# Patient Record
Sex: FEMALE | Race: BLACK OR AFRICAN AMERICAN | NOT HISPANIC OR LATINO | Employment: UNEMPLOYED | URBAN - METROPOLITAN AREA
[De-identification: names, ages, dates, MRNs, and addresses within clinical notes are randomized per-mention and may not be internally consistent; named-entity substitution may affect disease eponyms.]

---

## 2018-01-13 NOTE — MISCELLANEOUS
Provider Comments  Provider Comments:   PT DID NOT SHOW FOR INITIAL APPT-NO FURTHER APPTS WILL BE GIVEN      Signatures   Electronically signed by : Bi Yeager DO; Mar 31 2016 11:27AM EST                       (Author)

## 2018-02-11 ENCOUNTER — HOSPITAL ENCOUNTER (EMERGENCY)
Facility: HOSPITAL | Age: 28
Discharge: HOME | End: 2018-02-11
Admitting: EMERGENCY MEDICINE

## 2018-02-11 ENCOUNTER — HOSPITAL ENCOUNTER (EMERGENCY)
Facility: HOSPITAL | Age: 28
Discharge: HOME | End: 2018-02-11
Attending: EMERGENCY MEDICINE

## 2018-02-11 VITALS
HEART RATE: 108 BPM | BODY MASS INDEX: 26.58 KG/M2 | TEMPERATURE: 98.6 F | HEIGHT: 63 IN | SYSTOLIC BLOOD PRESSURE: 142 MMHG | DIASTOLIC BLOOD PRESSURE: 85 MMHG | OXYGEN SATURATION: 100 % | WEIGHT: 150 LBS | RESPIRATION RATE: 18 BRPM

## 2018-02-11 VITALS
TEMPERATURE: 97.6 F | DIASTOLIC BLOOD PRESSURE: 82 MMHG | HEART RATE: 107 BPM | OXYGEN SATURATION: 99 % | SYSTOLIC BLOOD PRESSURE: 148 MMHG | RESPIRATION RATE: 20 BRPM

## 2018-02-11 DIAGNOSIS — Z51.89 ABSCESS RE-CHECK: Primary | ICD-10-CM

## 2018-02-11 DIAGNOSIS — L02.91 ABSCESS: Primary | ICD-10-CM

## 2018-02-11 PROCEDURE — 99283 EMERGENCY DEPT VISIT LOW MDM: CPT

## 2018-02-11 PROCEDURE — 96372 THER/PROPH/DIAG INJ SC/IM: CPT

## 2018-02-11 PROCEDURE — 87070 CULTURE OTHR SPECIMN AEROBIC: CPT | Performed by: PHYSICIAN ASSISTANT

## 2018-02-11 PROCEDURE — 99282 EMERGENCY DEPT VISIT SF MDM: CPT

## 2018-02-11 PROCEDURE — 87205 SMEAR GRAM STAIN: CPT | Performed by: PHYSICIAN ASSISTANT

## 2018-02-11 RX ORDER — MORPHINE SULFATE 4 MG/ML
4 INJECTION, SOLUTION INTRAMUSCULAR; INTRAVENOUS ONCE
Status: COMPLETED | OUTPATIENT
Start: 2018-02-11 | End: 2018-02-11

## 2018-02-11 RX ORDER — CLINDAMYCIN PHOSPHATE 150 MG/ML
600 INJECTION, SOLUTION INTRAVENOUS EVERY 8 HOURS
Status: DISCONTINUED | OUTPATIENT
Start: 2018-02-11 | End: 2018-02-11 | Stop reason: HOSPADM

## 2018-02-11 RX ORDER — LIDOCAINE HYDROCHLORIDE AND EPINEPHRINE 10; 10 MG/ML; UG/ML
1 INJECTION, SOLUTION INFILTRATION; PERINEURAL ONCE
Status: COMPLETED | OUTPATIENT
Start: 2018-02-11 | End: 2018-02-11

## 2018-02-11 RX ORDER — SULFAMETHOXAZOLE AND TRIMETHOPRIM 800; 160 MG/1; MG/1
1 TABLET ORAL EVERY 12 HOURS SCHEDULED
COMMUNITY
End: 2018-08-09

## 2018-02-11 RX ORDER — NAPROXEN 500 MG/1
250 TABLET ORAL 2 TIMES DAILY WITH MEALS
COMMUNITY
End: 2018-08-09

## 2018-02-11 RX ORDER — CLINDAMYCIN HYDROCHLORIDE 300 MG/1
300 CAPSULE ORAL EVERY 6 HOURS
Qty: 40 CAPSULE | Refills: 0 | Status: SHIPPED | OUTPATIENT
Start: 2018-02-11 | End: 2018-02-21

## 2018-02-11 RX ORDER — OXYCODONE HYDROCHLORIDE AND ACETAMINOPHEN 5; 325 MG/1; MG/1
1 TABLET ORAL EVERY 8 HOURS PRN
Qty: 10 TABLET | Refills: 0 | Status: SHIPPED | OUTPATIENT
Start: 2018-02-11 | End: 2018-02-15

## 2018-02-11 RX ADMIN — MORPHINE SULFATE 4 MG: 4 INJECTION, SOLUTION INTRAMUSCULAR; INTRAVENOUS at 04:18

## 2018-02-11 RX ADMIN — CLINDAMYCIN PHOSPHATE 600 MG: 150 INJECTION, SOLUTION INTRAMUSCULAR; INTRAVENOUS at 03:39

## 2018-02-11 RX ADMIN — LIDOCAINE HYDROCHLORIDE,EPINEPHRINE BITARTRATE 1 ML: 10; .01 INJECTION, SOLUTION INFILTRATION; PERINEURAL at 03:42

## 2018-02-11 NOTE — DISCHARGE INSTRUCTIONS
Abscess   WHAT YOU NEED TO KNOW:   A warm compress may help your abscess drain  Your healthcare provider may make a cut in the abscess so it can drain  You may need surgery to remove an abscess that is on your hands or buttocks  DISCHARGE INSTRUCTIONS:   Return to the emergency department if:   · The area around your abscess becomes very painful, warm, or has red streaks  · You have a fever and chills  · Your heart is beating faster than usual      · You feel faint or confused  Contact your healthcare provider if:   · Your abscess gets bigger or does not get better  · Your abscess returns  · You have questions or concerns about your condition or care  Medicines: You may  need any of the following:  · Antibiotics  help treat a bacterial infection  · Acetaminophen  decreases pain and fever  It is available without a doctor's order  Ask how much to take and how often to take it  Follow directions  Acetaminophen can cause liver damage if not taken correctly  · NSAIDs , such as ibuprofen, help decrease swelling, pain, and fever  This medicine is available with or without a doctor's order  NSAIDs can cause stomach bleeding or kidney problems in certain people  If you take blood thinner medicine, always ask your healthcare provider if NSAIDs are safe for you  Always read the medicine label and follow directions  · Take your medicine as directed  Contact your healthcare provider if you think your medicine is not helping or if you have side effects  Tell him or her if you are allergic to any medicine  Keep a list of the medicines, vitamins, and herbs you take  Include the amounts, and when and why you take them  Bring the list or the pill bottles to follow-up visits  Carry your medicine list with you in case of an emergency  Self-care:   · Apply a warm compress to your abscess  This will help it open and drain  Wet a washcloth in warm, but not hot, water  Apply the compress for 10 minutes  Repeat this 4 times each day  Do not  press on an abscess or try to open it with a needle  You may push the bacteria deeper or into your blood  · Do not share your clothes, towels, or sheets with anyone  This can spread the infection to others  · Wash your hands often  This can help prevent the spread of germs  Use soap and water or an alcohol-based hand rub  Care for your wound after it is drained:   · Care for your wound as directed  If your healthcare provider says it is okay, carefully remove the bandage and gauze packing  You may need to soak the gauze to get it out of your wound  Clean your wound and the area around it as directed  Dry the area and put on new, clean bandages  Change your bandages when they get wet or dirty  · Ask your healthcare provider how to change the gauze in your wound  Keep track of how many pieces of gauze are placed inside the wound  Do not put too much packing in the wound  Do not pack the gauze too tightly in your wound  Follow up with your healthcare provider in 1 to 3 days: You may need to have your packing removed or your bandage changed  Write down your questions so you remember to ask them during your visits  © 2017 [employer]  Information is for End User's use only and may not be sold, redistributed or otherwise used for commercial purposes  All illustrations and images included in CareNotes® are the copyrighted property of [employer] , [employer]  or Jj Munson  The above information is an  only  It is not intended as medical advice for individual conditions or treatments  Talk to your doctor, nurse or pharmacist before following any medical regimen to see if it is safe and effective for you

## 2018-02-11 NOTE — ED PROVIDER NOTES
History  Chief Complaint   Patient presents with    Abscess     seen at 4am today, abscess drained  was to return tomorrow  states having excessive amt of drainage, cant control it     Patient is a 51-year-old female presenting today for an abscess check  Was seen here at 4:00 a m  this morning with an abscess drainage  Was started on clindamycin  States that since then she has had a large amount of drainage from this region  Her abscess was packed  States that overall her pain has improved  Came back because she continues with large amount of purulent drainage  Denies fevers, nausea, vomiting, difficulty ambulating  Prior to Admission Medications   Prescriptions Last Dose Informant Patient Reported? Taking? clindamycin (CLEOCIN) 300 MG capsule   No No   Sig: Take 1 capsule (300 mg total) by mouth every 6 (six) hours for 10 days   naproxen (NAPROSYN) 500 mg tablet   Yes Yes   Sig: Take 250 mg by mouth 2 (two) times a day with meals   oxyCODONE-acetaminophen (PERCOCET) 5-325 mg per tablet   No No   Sig: Take 1 tablet by mouth every 8 (eight) hours as needed for moderate pain for up to 3 days Max Daily Amount: 3 tablets   sulfamethoxazole-trimethoprim (BACTRIM DS) 800-160 mg per tablet   Yes Yes   Sig: Take 1 tablet by mouth every 12 (twelve) hours      Facility-Administered Medications: None       History reviewed  No pertinent past medical history  Past Surgical History:   Procedure Laterality Date    TONSILLECTOMY         History reviewed  No pertinent family history  I have reviewed and agree with the history as documented  Social History   Substance Use Topics    Smoking status: Former Smoker    Smokeless tobacco: Never Used      Comment: quit 1 month ago    Alcohol use Yes      Comment: rarely        Review of Systems   Constitutional: Negative  HENT: Negative  Eyes: Negative  Respiratory: Negative  Cardiovascular: Negative  Gastrointestinal: Negative  Genitourinary: Negative  Musculoskeletal: Negative  Skin: Positive for color change and wound  Negative for pallor and rash  Neurological: Negative  All other systems reviewed and are negative  Physical Exam  ED Triage Vitals [02/11/18 1632]   Temperature Pulse Respirations Blood Pressure SpO2   97 6 °F (36 4 °C) (!) 107 20 148/82 99 %      Temp Source Heart Rate Source Patient Position - Orthostatic VS BP Location FiO2 (%)   Tympanic Monitor Sitting Right arm --      Pain Score       Worst Possible Pain           Orthostatic Vital Signs  Vitals:    02/11/18 1632   BP: 148/82   Pulse: (!) 107   Patient Position - Orthostatic VS: Sitting       Physical Exam   Constitutional: She is oriented to person, place, and time  She appears well-developed and well-nourished  HENT:   Head: Normocephalic and atraumatic  Eyes: Conjunctivae are normal    Neck: Normal range of motion  Cardiovascular: Regular rhythm, normal heart sounds and intact distal pulses  Pulmonary/Chest: Effort normal and breath sounds normal    S PO2 is 99% indicating adequate oxygenation  Neurological: She is alert and oriented to person, place, and time  Skin: Skin is warm and dry  Capillary refill takes less than 2 seconds  Nursing note and vitals reviewed  ED Medications  Medications - No data to display    Diagnostic Studies  Results Reviewed     Procedure Component Value Units Date/Time    Wound culture and Gram stain [[id number]] Collected:  02/11/18 1727    Lab Status:  No result Specimen:  Wound from Buttock                  No orders to display              Procedures  Procedures       Phone Contacts  ED Phone Contact    ED Course  ED Course                                MDM  Number of Diagnoses or Management Options  Diagnosis management comments: Packing was removed once again and de loculated  More purulence was drained  Packing was then replaced    Will have patient follow up with wound care in 2 days for re-evaluation and packing removal or sooner for any worsening  Encouraged continuation of clindamycin and encouraged warm compresses  Wound culture was sent  Patient is informed to return to the emergency department for worsening of symptoms and was given proper education regarding their diagnosis and symptoms  Otherwise the patient is informed to follow up with their primary care doctor for re-evaluation  The patient verbalizes understanding and agrees with above assessment and plan  All questions were answered  Please Note: Fluency Direct voice recognition software may have been used in the creation of this document  Wrong words or sound a like substitutions may have occurred due to the inherent limitations of the voice software  Amount and/or Complexity of Data Reviewed  Clinical lab tests: ordered  Review and summarize past medical records: yes  Independent visualization of images, tracings, or specimens: yes      CritCare Time    Disposition  Final diagnoses:   Abscess re-check     Time reflects when diagnosis was documented in both MDM as applicable and the Disposition within this note     Time User Action Codes Description Comment    2/11/2018  5:32 PM Miguel Angel Black Add [Z51 89] Abscess re-check       ED Disposition     ED Disposition Condition Comment    Discharge  [address] [facility] discharge to home/self care      Condition at discharge: Good        Follow-up Information     Follow up With Specialties Details Why Contact Info Additional [address] Emergency Department Emergency Medicine Go to If symptoms worsen [facility]  [phone] [facility], [place], [place], [address] Schedule an appointment as soon as possible for a visit in 2 days  [address] [place] [address] [place], [address]        Patient's Medications   Discharge Prescriptions    No medications on file     No discharge procedures on file      ED Provider  Electronically Signed by           Jennifer Nichols PA-C  02/11/18 Andrea Rivera PA-C  02/11/18 8914

## 2018-02-11 NOTE — ED PROVIDER NOTES
History  Chief Complaint   Patient presents with    Wound Check     pt sat on a safety pink about 4 days ago, went to the doctor and was started on abx but now states the redness and swelling are spreading and she has a big lump     31 y/o female presents with abscess on her left thigh area  She says she sat on a safety pin few days ago and the area code infected  She went to another hospital where they treated her with antibiotics and pain medications and told her to follow up  Patient reports the infection has gotten worse and now she has an abscess in that area  She denies fevers chills nausea vomiting or any other symptoms  History provided by:  Patient   used: No        None       History reviewed  No pertinent past medical history  Past Surgical History:   Procedure Laterality Date    TONSILLECTOMY         History reviewed  No pertinent family history  I have reviewed and agree with the history as documented  Social History   Substance Use Topics    Smoking status: Former Smoker    Smokeless tobacco: Never Used      Comment: quit 1 month ago    Alcohol use Yes      Comment: rarely        Review of Systems   All other systems reviewed and are negative  Physical Exam  ED Triage Vitals [02/11/18 0152]   Temperature Pulse Respirations Blood Pressure SpO2   98 6 °F (37 °C) (!) 108 18 142/85 100 %      Temp Source Heart Rate Source Patient Position - Orthostatic VS BP Location FiO2 (%)   Oral Monitor Lying Left arm --      Pain Score       Worst Possible Pain           Orthostatic Vital Signs  Vitals:    02/11/18 0152   BP: 142/85   Pulse: (!) 108   Patient Position - Orthostatic VS: Lying       Physical Exam   Constitutional: She is oriented to person, place, and time  She appears well-developed and well-nourished  HENT:   Head: Normocephalic and atraumatic  Eyes: EOM are normal  Pupils are equal, round, and reactive to light  Neck: Normal range of motion   Neck supple  Cardiovascular: Normal rate and regular rhythm  Pulmonary/Chest: Effort normal and breath sounds normal    Abdominal: Soft  Bowel sounds are normal    Musculoskeletal: Normal range of motion  Neurological: She is alert and oriented to person, place, and time  Skin: Skin is warm and dry  Left thigh :  Large 7 cm circular fluctuant mass noted consistent with the abscess with surrounding erythema  Psychiatric: She has a normal mood and affect  Nursing note and vitals reviewed  ED Medications  Medications   lidocaine-epinephrine (XYLOCAINE/EPINEPHRINE) 1 %-1:100,000 injection 1 mL (1 mL Infiltration Given 2/11/18 9204)   morphine (PF) 4 mg/mL injection 4 mg (4 mg Intramuscular Given 2/11/18 6561)       Diagnostic Studies  Results Reviewed     None                 No orders to display              Procedures  Incision/Drainage  Performed by: Anahy Mills by: Brad Chung     Patient location:  ED  Consent:     Consent obtained:  Verbal    Consent given by:  Patient    Alternatives discussed:  No treatment  Universal protocol:     Patient identity confirmed:  Verbally with patient  Location:     Type:  Abscess  Pre-procedure details:     Skin preparation:  Betadine  Anesthesia (see MAR for exact dosages): Anesthesia method:  Local infiltration    Local anesthetic:  Lidocaine 1% WITH epi  Procedure details:     Complexity:  Complex    Needle aspiration: no      Incision types:  Stab incision    Scalpel blade:  11    Approach:  Open    Incision depth:  Subcutaneous    Wound management:  Probed and deloculated    Drainage:  Purulent    Drainage amount:  Copious    Wound treatment:  Packing placed    Packing materials:  1/4 in iodoform gauze  Post-procedure details:     Patient tolerance of procedure:   Tolerated well, no immediate complications           Phone Contacts  ED Phone Contact    ED Course  ED Course                                MDM  Number of Diagnoses or Management Options  Abscess:   Diagnosis management comments: Patient given antibiotics and pain medication in the ED  I performed an I and D of the abscess as described in my procedure note  Recheck in the ED in 2 days  I also provided her referral to the wound [place]  Patient verbalized understanding of instructions had no further questions at the time of discharge  Patient Progress  Patient progress: stable    CritCare Time    Disposition  Final diagnoses:   Abscess     Time reflects when diagnosis was documented in both MDM as applicable and the Disposition within this note     Time User Action Codes Description Comment    2/11/2018  4:02 AM Khloe Monroe Add [L02 91] Abscess       ED Disposition     ED Disposition Condition Comment    Discharge  [address] [facility] discharge to home/self care      Condition at discharge: Stable        Follow-up Information     Follow up With Specialties Details Why Contact Info Additional Information    [address] [facility] Emergency Department Emergency Medicine In 2 days For wound re-check [address] [facility] ED, [place], [place], [address] Schedule an appointment as soon as possible for a visit in 2 days  [address] [facility], [place], [place], [address]        Discharge Medication List as of 2/11/2018  4:03 AM      START taking these medications    Details   clindamycin (CLEOCIN) 300 MG capsule Take 1 capsule (300 mg total) by mouth every 6 (six) hours for 10 days, Starting Sun 2/11/2018, Until Wed 2/21/2018, Print      oxyCODONE-acetaminophen (PERCOCET) 5-325 mg per tablet Take 1 tablet by mouth every 8 (eight) hours as needed for moderate pain for up to 3 days Max Daily Amount: 3 tablets, Starting Sun 2/11/2018, Until Wed 2/14/2018, Print           No discharge procedures on file     ED Provider  Electronically Signed by           Tiffany Lizama,   02/15/18 8216

## 2018-02-14 LAB
BACTERIA WND AEROBE CULT: NORMAL
GRAM STN SPEC: NORMAL

## 2018-02-15 ENCOUNTER — HOSPITAL ENCOUNTER (EMERGENCY)
Facility: HOSPITAL | Age: 28
Discharge: HOME | End: 2018-02-15

## 2018-02-15 VITALS
WEIGHT: 150 LBS | BODY MASS INDEX: 26.58 KG/M2 | HEART RATE: 85 BPM | SYSTOLIC BLOOD PRESSURE: 134 MMHG | HEIGHT: 63 IN | OXYGEN SATURATION: 99 % | TEMPERATURE: 98.1 F | DIASTOLIC BLOOD PRESSURE: 87 MMHG | RESPIRATION RATE: 18 BRPM

## 2018-02-15 DIAGNOSIS — Z51.89 WOUND CHECK, ABSCESS: Primary | ICD-10-CM

## 2018-02-15 PROCEDURE — 99282 EMERGENCY DEPT VISIT SF MDM: CPT

## 2018-02-15 NOTE — ED PROVIDER NOTES
History  Chief Complaint   Patient presents with    Wound Check     States here for recheck/packing removal left groin abscess  States improved     Patient is a 51-year-old female presenting today for a wound check of her left inner thigh for an abscess that was drained 4 days ago  States that it continues to drain in small amount  Overall feeling better with decreased pain and redness  Packing still present  Some episodes of vomiting, however no fevers, cough, congestion, shortness of breath etc              Prior to Admission Medications   Prescriptions Last Dose Informant Patient Reported? Taking? clindamycin (CLEOCIN) 300 MG capsule 2/15/2018 at Unknown time  No Yes   Sig: Take 1 capsule (300 mg total) by mouth every 6 (six) hours for 10 days   naproxen (NAPROSYN) 500 mg tablet 2/15/2018 at Unknown time  Yes Yes   Sig: Take 250 mg by mouth 2 (two) times a day with meals   oxyCODONE-acetaminophen (PERCOCET) 5-325 mg per tablet 2/14/2018 at Unknown time  No Yes   Sig: Take 1 tablet by mouth every 8 (eight) hours as needed for moderate pain for up to 3 days Max Daily Amount: 3 tablets   sulfamethoxazole-trimethoprim (BACTRIM DS) 800-160 mg per tablet 2/15/2018 at Unknown time  Yes Yes   Sig: Take 1 tablet by mouth every 12 (twelve) hours      Facility-Administered Medications: None       History reviewed  No pertinent past medical history  Past Surgical History:   Procedure Laterality Date    TONSILLECTOMY         History reviewed  No pertinent family history  I have reviewed and agree with the history as documented  Social History   Substance Use Topics    Smoking status: Former Smoker    Smokeless tobacco: Never Used      Comment: quit 1 month ago    Alcohol use Yes      Comment: rarely        Review of Systems   Constitutional: Negative  Negative for activity change and appetite change  HENT: Negative  Respiratory: Negative  Cardiovascular: Negative  Gastrointestinal: Negative  Genitourinary: Negative  Musculoskeletal: Negative  Negative for arthralgias and back pain  Skin: Positive for wound  Negative for color change, pallor and rash  Neurological: Negative  All other systems reviewed and are negative  Physical Exam  ED Triage Vitals [02/15/18 1159]   Temperature Pulse Respirations Blood Pressure SpO2   98 1 °F (36 7 °C) 85 18 134/87 99 %      Temp Source Heart Rate Source Patient Position - Orthostatic VS BP Location FiO2 (%)   Oral Monitor Sitting Left arm --      Pain Score       8           Orthostatic Vital Signs  Vitals:    02/15/18 1159   BP: 134/87   Pulse: 85   Patient Position - Orthostatic VS: Sitting       Physical Exam   Constitutional: She appears well-developed and well-nourished  Eyes: Conjunctivae are normal    Neck: Normal range of motion  Cardiovascular: Normal rate  Pulmonary/Chest: Effort normal    spo2 is 99% indicating adequate oxygenation  Neurological: She is alert  Skin: Skin is warm and dry  Capillary refill takes less than 2 seconds  No rash noted  No erythema  No pallor  Nursing note and vitals reviewed  ED Medications  Medications - No data to display    Diagnostic Studies  Results Reviewed     None                 No orders to display              Procedures  Procedures       Phone Contacts  ED Phone Contact    ED Course  ED Course                                MDM  Number of Diagnoses or Management Options  Wound check, abscess:   Diagnosis management comments: Patient left ED without papers  I informed her to f/u with wound care  Appears to be healing well however continues with induration  Overall feeling better  Patient was given strict return precautions for any worsening of symptoms otherwise encouraged to continue taking antibiotics  Patient is informed to return to the emergency department for worsening of symptoms and was given proper education regarding their diagnosis and symptoms   Otherwise the patient is informed to follow up with their primary care doctor for re-evaluation  The patient verbalizes understanding and agrees with above assessment and plan  All questions were answered  Please Note: Fluency Direct voice recognition software may have been used in the creation of this document  Wrong words or sound a like substitutions may have occurred due to the inherent limitations of the voice software  Amount and/or Complexity of Data Reviewed  Review and summarize past medical records: yes  Independent visualization of images, tracings, or specimens: yes      CritCare Time    Disposition  Final diagnoses:   Wound check, abscess     Time reflects when diagnosis was documented in both MDM as applicable and the Disposition within this note     Time User Action Codes Description Comment    2/15/2018 12:25 PM Apurva Amanda Add [Z51 89] Wound check, abscess       ED Disposition     ED Disposition Condition Comment    Discharge  [address] [facility] discharge to home/self care  Condition at discharge: Good        Follow-up Information     Follow up With Specialties Details Why Contact Info Additional [address] [facility] Schedule an appointment as soon as possible for a visit in 2 days  [address] [facility], [place], [place], [address] [facility] Emergency Department Emergency Medicine Go to If symptoms worsen [address] [facility]  [phone] [facility] ED, [place], [place], [address]        Patient's Medications   Discharge Prescriptions    No medications on file     No discharge procedures on file      ED Provider  Electronically Signed by           Teresa Taveras PA-C  02/15/18 4174

## 2018-08-09 ENCOUNTER — HOSPITAL ENCOUNTER (EMERGENCY)
Facility: HOSPITAL | Age: 28
Discharge: HOME | End: 2018-08-09
Attending: EMERGENCY MEDICINE | Admitting: EMERGENCY MEDICINE
Payer: COMMERCIAL

## 2018-08-09 VITALS
DIASTOLIC BLOOD PRESSURE: 81 MMHG | OXYGEN SATURATION: 100 % | TEMPERATURE: 98.4 F | HEART RATE: 94 BPM | RESPIRATION RATE: 18 BRPM | SYSTOLIC BLOOD PRESSURE: 137 MMHG | WEIGHT: 130.51 LBS | BODY MASS INDEX: 23.12 KG/M2

## 2018-08-09 DIAGNOSIS — L02.91 ABSCESS: Primary | ICD-10-CM

## 2018-08-09 PROCEDURE — 87070 CULTURE OTHR SPECIMN AEROBIC: CPT | Performed by: PHYSICIAN ASSISTANT

## 2018-08-09 PROCEDURE — 90471 IMMUNIZATION ADMIN: CPT

## 2018-08-09 PROCEDURE — 90715 TDAP VACCINE 7 YRS/> IM: CPT

## 2018-08-09 PROCEDURE — 87205 SMEAR GRAM STAIN: CPT | Performed by: PHYSICIAN ASSISTANT

## 2018-08-09 PROCEDURE — 99283 EMERGENCY DEPT VISIT LOW MDM: CPT

## 2018-08-09 RX ORDER — IBUPROFEN 600 MG/1
600 TABLET ORAL EVERY 6 HOURS PRN
Qty: 30 TABLET | Refills: 0 | Status: SHIPPED | OUTPATIENT
Start: 2018-08-09 | End: 2020-02-20

## 2018-08-09 RX ORDER — SULFAMETHOXAZOLE AND TRIMETHOPRIM 800; 160 MG/1; MG/1
1 TABLET ORAL ONCE
Status: COMPLETED | OUTPATIENT
Start: 2018-08-09 | End: 2018-08-09

## 2018-08-09 RX ORDER — SULFAMETHOXAZOLE AND TRIMETHOPRIM 800; 160 MG/1; MG/1
1 TABLET ORAL 2 TIMES DAILY
Qty: 14 TABLET | Refills: 0 | Status: SHIPPED | OUTPATIENT
Start: 2018-08-09 | End: 2018-08-16

## 2018-08-09 RX ORDER — ACETAMINOPHEN 325 MG/1
TABLET ORAL
Status: COMPLETED
Start: 2018-08-09 | End: 2018-08-09

## 2018-08-09 RX ORDER — ACETAMINOPHEN 325 MG/1
650 TABLET ORAL ONCE
Status: COMPLETED | OUTPATIENT
Start: 2018-08-09 | End: 2018-08-09

## 2018-08-09 RX ORDER — CEPHALEXIN 500 MG/1
500 CAPSULE ORAL ONCE
Status: COMPLETED | OUTPATIENT
Start: 2018-08-09 | End: 2018-08-09

## 2018-08-09 RX ORDER — LIDOCAINE HYDROCHLORIDE 10 MG/ML
INJECTION, SOLUTION EPIDURAL; INFILTRATION; INTRACAUDAL; PERINEURAL
Status: COMPLETED
Start: 2018-08-09 | End: 2018-08-09

## 2018-08-09 RX ORDER — SULFAMETHOXAZOLE AND TRIMETHOPRIM 800; 160 MG/1; MG/1
TABLET ORAL
Status: COMPLETED
Start: 2018-08-09 | End: 2018-08-09

## 2018-08-09 RX ORDER — LIDOCAINE HYDROCHLORIDE 10 MG/ML
INJECTION, SOLUTION EPIDURAL; INFILTRATION; INTRACAUDAL; PERINEURAL
Status: DISCONTINUED
Start: 2018-08-09 | End: 2018-08-09 | Stop reason: HOSPADM

## 2018-08-09 RX ORDER — CEPHALEXIN 500 MG/1
500 CAPSULE ORAL EVERY 8 HOURS SCHEDULED
Qty: 30 CAPSULE | Refills: 0 | Status: SHIPPED | OUTPATIENT
Start: 2018-08-09 | End: 2018-08-19

## 2018-08-09 RX ORDER — LIDOCAINE HYDROCHLORIDE 10 MG/ML
4 INJECTION, SOLUTION EPIDURAL; INFILTRATION; INTRACAUDAL; PERINEURAL ONCE
Status: DISCONTINUED | OUTPATIENT
Start: 2018-08-09 | End: 2018-08-09 | Stop reason: HOSPADM

## 2018-08-09 RX ORDER — CEPHALEXIN 500 MG/1
CAPSULE ORAL
Status: COMPLETED
Start: 2018-08-09 | End: 2018-08-09

## 2018-08-09 RX ADMIN — TETANUS TOXOID, REDUCED DIPHTHERIA TOXOID AND ACELLULAR PERTUSSIS VACCINE, ADSORBED 0.5 ML: 5; 2.5; 8; 8; 2.5 SUSPENSION INTRAMUSCULAR at 14:43

## 2018-08-09 RX ADMIN — ACETAMINOPHEN 650 MG: 325 TABLET ORAL at 14:42

## 2018-08-09 RX ADMIN — CEPHALEXIN 500 MG: 500 CAPSULE ORAL at 14:42

## 2018-08-09 RX ADMIN — SULFAMETHOXAZOLE AND TRIMETHOPRIM 1 TABLET: 800; 160 TABLET ORAL at 14:42

## 2018-08-09 RX ADMIN — LIDOCAINE HYDROCHLORIDE 20 MG: 10 INJECTION, SOLUTION EPIDURAL; INFILTRATION; INTRACAUDAL; PERINEURAL at 14:45

## 2018-08-09 NOTE — DISCHARGE INSTRUCTIONS
Abscess   WHAT YOU NEED TO KNOW:   A warm compress may help your abscess drain  Your healthcare provider may make a cut in the abscess so it can drain  You may need surgery to remove an abscess that is on your hands or buttocks  DISCHARGE INSTRUCTIONS:   Return to the emergency department if:   · The area around your abscess becomes very painful, warm, or has red streaks  · You have a fever and chills  · Your heart is beating faster than usual      · You feel faint or confused  Contact your healthcare provider if:   · Your abscess gets bigger or does not get better  · Your abscess returns  · You have questions or concerns about your condition or care  Medicines: You may  need any of the following:  · Antibiotics  help treat a bacterial infection  · Acetaminophen  decreases pain and fever  It is available without a doctor's order  Ask how much to take and how often to take it  Follow directions  Acetaminophen can cause liver damage if not taken correctly  · NSAIDs , such as ibuprofen, help decrease swelling, pain, and fever  This medicine is available with or without a doctor's order  NSAIDs can cause stomach bleeding or kidney problems in certain people  If you take blood thinner medicine, always ask your healthcare provider if NSAIDs are safe for you  Always read the medicine label and follow directions  · Take your medicine as directed  Contact your healthcare provider if you think your medicine is not helping or if you have side effects  Tell him or her if you are allergic to any medicine  Keep a list of the medicines, vitamins, and herbs you take  Include the amounts, and when and why you take them  Bring the list or the pill bottles to follow-up visits  Carry your medicine list with you in case of an emergency  Self-care:   · Apply a warm compress to your abscess  This will help it open and drain  Wet a washcloth in warm, but not hot, water  Apply the compress for 10 minutes  Repeat this 4 times each day  Do not  press on an abscess or try to open it with a needle  You may push the bacteria deeper or into your blood  · Do not share your clothes, towels, or sheets with anyone  This can spread the infection to others  · Wash your hands often  This can help prevent the spread of germs  Use soap and water or an alcohol-based hand rub  Care for your wound after it is drained:   · Care for your wound as directed  If your healthcare provider says it is okay, carefully remove the bandage and gauze packing  You may need to soak the gauze to get it out of your wound  Clean your wound and the area around it as directed  Dry the area and put on new, clean bandages  Change your bandages when they get wet or dirty  · Ask your healthcare provider how to change the gauze in your wound  Keep track of how many pieces of gauze are placed inside the wound  Do not put too much packing in the wound  Do not pack the gauze too tightly in your wound  Follow up with your healthcare provider in 1 to 3 days: You may need to have your packing removed or your bandage changed  Write down your questions so you remember to ask them during your visits  © 2017 [facility] Information is for End User's use only and may not be sold, redistributed or otherwise used for commercial purposes  All illustrations and images included in CareNotes® are the copyrighted property of [employer]  or Jj Munson  The above information is an  only  It is not intended as medical advice for individual conditions or treatments  Talk to your doctor, nurse or pharmacist before following any medical regimen to see if it is safe and effective for you  Abscess   WHAT YOU NEED TO KNOW:   A warm compress may help your abscess drain  Your healthcare provider may make a cut in the abscess so it can drain  You may need surgery to remove an abscess that is on your hands or buttocks  DISCHARGE INSTRUCTIONS:   Return to the emergency department if:   · The area around your abscess becomes very painful, warm, or has red streaks  · You have a fever and chills  · Your heart is beating faster than usual      · You feel faint or confused  Contact your healthcare provider if:   · Your abscess gets bigger or does not get better  · Your abscess returns  · You have questions or concerns about your condition or care  Medicines: You may  need any of the following:  · Antibiotics  help treat a bacterial infection  · Acetaminophen  decreases pain and fever  It is available without a doctor's order  Ask how much to take and how often to take it  Follow directions  Acetaminophen can cause liver damage if not taken correctly  · NSAIDs , such as ibuprofen, help decrease swelling, pain, and fever  This medicine is available with or without a doctor's order  NSAIDs can cause stomach bleeding or kidney problems in certain people  If you take blood thinner medicine, always ask your healthcare provider if NSAIDs are safe for you  Always read the medicine label and follow directions  · Take your medicine as directed  Contact your healthcare provider if you think your medicine is not helping or if you have side effects  Tell him or her if you are allergic to any medicine  Keep a list of the medicines, vitamins, and herbs you take  Include the amounts, and when and why you take them  Bring the list or the pill bottles to follow-up visits  Carry your medicine list with you in case of an emergency  Self-care:   · Apply a warm compress to your abscess  This will help it open and drain  Wet a washcloth in warm, but not hot, water  Apply the compress for 10 minutes  Repeat this 4 times each day  Do not  press on an abscess or try to open it with a needle  You may push the bacteria deeper or into your blood  · Do not share your clothes, towels, or sheets with anyone    This can spread the infection to others  · Wash your hands often  This can help prevent the spread of germs  Use soap and water or an alcohol-based hand rub  Care for your wound after it is drained:   · Care for your wound as directed  If your healthcare provider says it is okay, carefully remove the bandage and gauze packing  You may need to soak the gauze to get it out of your wound  Clean your wound and the area around it as directed  Dry the area and put on new, clean bandages  Change your bandages when they get wet or dirty  · Ask your healthcare provider how to change the gauze in your wound  Keep track of how many pieces of gauze are placed inside the wound  Do not put too much packing in the wound  Do not pack the gauze too tightly in your wound  Follow up with your healthcare provider in 1 to 3 days: You may need to have your packing removed or your bandage changed  Write down your questions so you remember to ask them during your visits  © 2017 [address] [facility] Information is for End User's use only and may not be sold, redistributed or otherwise used for commercial purposes  All illustrations and images included in CareNotes® are the copyrighted property of A [employer] M [employer]  or Jj Munson  The above information is an  only  It is not intended as medical advice for individual conditions or treatments  Talk to your doctor, nurse or pharmacist before following any medical regimen to see if it is safe and effective for you

## 2018-08-09 NOTE — ED PROVIDER NOTES
History  Chief Complaint   Patient presents with    Abscess     I believe I have an abscess  I thought it was a pimple and I was sitting on a heating pad for 3 days but it never came to head  Reports its on her L inner buttcheek       History provided by:  Patient   used: No    Medical Problem   Location:  Left buttocks pain -boil- for 3 days   Severity:  Moderate  Onset quality:  Gradual  Duration:  3 days  Timing:  Constant  Progression:  Worsening  Chronicity:  New  Associated symptoms: no abdominal pain, no chest pain, no congestion, no cough, no diarrhea, no ear pain, no fatigue, no fever, no headaches, no loss of consciousness, no myalgias, no nausea, no rash, no rhinorrhea, no shortness of breath, no sore throat, no vomiting and no wheezing        None       History reviewed  No pertinent past medical history  Past Surgical History:   Procedure Laterality Date    TONSILLECTOMY         History reviewed  No pertinent family history  I have reviewed and agree with the history as documented  Social History   Substance Use Topics    Smoking status: Former Smoker    Smokeless tobacco: Never Used      Comment: quit 1 month ago    Alcohol use Yes      Comment: rarely        Review of Systems   Constitutional: Negative for fatigue and fever  HENT: Negative  Negative for congestion, ear pain, rhinorrhea and sore throat  Eyes: Negative  Respiratory: Negative  Negative for cough, shortness of breath and wheezing  Cardiovascular: Negative  Negative for chest pain  Gastrointestinal: Negative  Negative for abdominal pain, diarrhea, nausea and vomiting  Endocrine: Negative  Genitourinary: Negative  Musculoskeletal: Negative for myalgias  Skin: Negative for rash  boil   Allergic/Immunologic: Negative  Neurological: Negative  Negative for loss of consciousness and headaches  Hematological: Negative  Psychiatric/Behavioral: Negative      All other systems reviewed and are negative  Physical Exam  Physical Exam   Constitutional: She appears well-developed and well-nourished  HENT:   Head: Normocephalic  Eyes: Conjunctivae and EOM are normal  Pupils are equal, round, and reactive to light  Neck: Normal range of motion  Neck supple  Cardiovascular: Normal rate, regular rhythm and normal heart sounds  Pulmonary/Chest: Effort normal    Abdominal: Soft  Bowel sounds are normal    Musculoskeletal: Normal range of motion  Neurological: She is alert  Skin: Skin is warm  Left superior medal buttocks abscess  2cm fluctuant    Nursing note and vitals reviewed  Vital Signs  ED Triage Vitals   Temperature Pulse Respirations Blood Pressure SpO2   08/09/18 1340 08/09/18 1340 08/09/18 1340 08/09/18 1340 08/09/18 1340   98 4 °F (36 9 °C) 94 18 137/81 100 %      Temp Source Heart Rate Source Patient Position - Orthostatic VS BP Location FiO2 (%)   08/09/18 1340 08/09/18 1340 -- 08/09/18 1340 --   Temporal Monitor  Left arm       Pain Score       08/09/18 1442       5           Vitals:    08/09/18 1340   BP: 137/81   Pulse: 94       Visual Acuity      ED Medications  Medications   lidocaine (PF) (XYLOCAINE-MPF) 1 % injection 4 mL (not administered)   lidocaine (PF) (XYLOCAINE-MPF) 1 % injection **AcuDose Override Pull** (20 mg  Given 8/9/18 1445)   acetaminophen (TYLENOL) tablet 650 mg (650 mg Oral Given 8/9/18 1442)   sulfamethoxazole-trimethoprim (BACTRIM DS) 800-160 mg per tablet 1 tablet (1 tablet Oral Given 8/9/18 1442)   cephalexin (KEFLEX) capsule 500 mg (500 mg Oral Given 8/9/18 1442)   tetanus-diphtheria-acellular pertussis (BOOSTRIX) IM injection 0 5 mL (0 5 mL Intramuscular Given 8/9/18 1443)       Diagnostic Studies  Results Reviewed     Procedure Component Value Units Date/Time    Wound culture and Gram stain [[id number]] Collected:  08/09/18 1438    Lab Status:   In process Specimen:  Wound from Sacrum Updated:  08/09/18 1443 No orders to display              Procedures  Incision/Drainage  Date/Time: 8/9/2018 2:46 PM  Performed by: MALOU Clinton  Authorized by: MALOU PEACOCK     Patient location:  ED  Other Assisting Provider: Yes (comment)    Consent:     Consent obtained:  Verbal    Consent given by:  Patient    Risks discussed:  Incomplete drainage, pain, infection, damage to other organs and bleeding    Alternatives discussed:  No treatment  Universal protocol:     Immediately prior to procedure a time out was called: yes      Patient identity confirmed:  Verbally with patient and arm band  Location:     Type:  Abscess    Size:  2cm    Location:  Trunk    Trunk location:  Back  Pre-procedure details:     Skin preparation:  Betadine  Anesthesia (see MAR for exact dosages): Anesthesia method:  Local infiltration    Local anesthetic:  Lidocaine 1% w/o epi  Procedure details:     Complexity:  Simple    Needle aspiration: no      Incision types:  Single straight    Scalpel blade:  11    Approach:  Open    Incision depth:  Subcutaneous    Wound management:  Probed and deloculated and irrigated with saline    Drainage:  Purulent    Drainage amount: Moderate    Wound treatment:  Packing placed    Packing materials:  1/4 in iodoform gauze    Amount 1/4" iodoform:  6cm  Post-procedure details:     Patient tolerance of procedure: Tolerated well, no immediate complications           Phone Contacts  ED Phone Contact    ED Course                               MDM  CritCare Time    Disposition  Final diagnoses:   Abscess     Time reflects when diagnosis was documented in both MDM as applicable and the Disposition within this note     Time User Action Codes Description Comment    8/9/2018  2:48 PM Evelyne Cancer, [address] [L02 91] Abscess       ED Disposition     ED Disposition Condition Comment    Discharge  [address] [facility] discharge to home/self care      Condition at discharge: Good        Follow-up Information     Follow up With Specialties Details Why [facility] Emergency Department Emergency Medicine In 2 days saturday 2pm [address] [address] [phone]          Discharge Medication List as of 8/9/2018  2:51 PM      START taking these medications    Details   cephalexin (KEFLEX) 500 mg capsule Take 1 capsule (500 mg total) by mouth every 8 (eight) hours for 10 days, Starting Thu 8/9/2018, Until Sun 8/19/2018, Print      ibuprofen (MOTRIN) 600 mg tablet Take 1 tablet (600 mg total) by mouth every 6 (six) hours as needed (pain), Starting Thu 8/9/2018, Print      sulfamethoxazole-trimethoprim (BACTRIM DS) 800-160 mg per tablet Take 1 tablet by mouth 2 (two) times a day for 7 days smx-tmp DS (BACTRIM) 800-160 mg tabs (1tab q12 D10), Starting Thu 8/9/2018, Until Thu 8/16/2018, Print           No discharge procedures on file      ED Provider  Electronically Signed by           Dylon Gould PA-C  08/09/18 5948

## 2018-08-11 ENCOUNTER — HOSPITAL ENCOUNTER (EMERGENCY)
Facility: HOSPITAL | Age: 28
Discharge: HOME | End: 2018-08-11
Attending: EMERGENCY MEDICINE
Payer: COMMERCIAL

## 2018-08-11 VITALS
OXYGEN SATURATION: 99 % | TEMPERATURE: 98.5 F | BODY MASS INDEX: 23.04 KG/M2 | DIASTOLIC BLOOD PRESSURE: 67 MMHG | HEIGHT: 63 IN | HEART RATE: 78 BPM | RESPIRATION RATE: 16 BRPM | SYSTOLIC BLOOD PRESSURE: 123 MMHG | WEIGHT: 130 LBS

## 2018-08-11 DIAGNOSIS — K59.00 CONSTIPATION: ICD-10-CM

## 2018-08-11 DIAGNOSIS — Z51.89 WOUND CHECK, ABSCESS: Primary | ICD-10-CM

## 2018-08-11 PROCEDURE — 99282 EMERGENCY DEPT VISIT SF MDM: CPT

## 2018-08-11 NOTE — ED PROVIDER NOTES
History  Chief Complaint   Patient presents with    Abscess     I had an abscess to my tail bone  I was here 2 days ago, when they opened it  I am taking my ordered medications  49-year-old female presenting for wound check of gluteal abscess  Patient reports being seen in this ED approximately 2 days ago for an I and D of a left gluteal abscess  Packing was applied at that time  She reports she has been taking her prescriptions of both Keflex and Bactrim as prescribed  She does admit to drainage from the area but has been changing the dressings regularly  She denies any fevers chills or sweats  Patient also complaining of constipation she has been scared to have a bowel movement since 1st noticing this abscess  Prior to Admission Medications   Prescriptions Last Dose Informant Patient Reported? Taking? cephalexin (KEFLEX) 500 mg capsule   No No   Sig: Take 1 capsule (500 mg total) by mouth every 8 (eight) hours for 10 days   ibuprofen (MOTRIN) 600 mg tablet   No No   Sig: Take 1 tablet (600 mg total) by mouth every 6 (six) hours as needed (pain)   sulfamethoxazole-trimethoprim (BACTRIM DS) 800-160 mg per tablet   No No   Sig: Take 1 tablet by mouth 2 (two) times a day for 7 days smx-tmp DS (BACTRIM) 800-160 mg tabs (1tab q12 D10)      Facility-Administered Medications: None       History reviewed  No pertinent past medical history  Past Surgical History:   Procedure Laterality Date    TONSILLECTOMY         History reviewed  No pertinent family history  I have reviewed and agree with the history as documented  Social History   Substance Use Topics    Smoking status: Former Smoker    Smokeless tobacco: Never Used      Comment: quit 1 month ago    Alcohol use Yes      Comment: rarely        Review of Systems   All other systems reviewed and are negative  Physical Exam  Physical Exam   Constitutional: She is oriented to person, place, and time   She appears well-developed and well-nourished  No distress  HENT:   Head: Normocephalic and atraumatic  Eyes: Conjunctivae are normal    EOM grossly intact   Neck: Normal range of motion  Neck supple  No JVD present  Cardiovascular: Normal rate  Pulmonary/Chest: Effort normal    Abdominal: Soft  Musculoskeletal:   FROM, steady gait, cap refill brisk, strength and sensation grossly intact throughout   Neurological: She is alert and oriented to person, place, and time  Skin: Skin is warm and dry  Capillary refill takes less than 2 seconds  Psychiatric: She has a normal mood and affect  Her behavior is normal    Nursing note and vitals reviewed  Vital Signs  ED Triage Vitals [08/11/18 0753]   Temperature Pulse Respirations Blood Pressure SpO2   98 5 °F (36 9 °C) 78 16 123/67 99 %      Temp Source Heart Rate Source Patient Position - Orthostatic VS BP Location FiO2 (%)   Temporal Monitor Sitting Left arm --      Pain Score       3           Vitals:    08/11/18 0753   BP: 123/67   Pulse: 78   Patient Position - Orthostatic VS: Sitting       Visual Acuity      ED Medications  Medications - No data to display    Diagnostic Studies  Results Reviewed     None                 No orders to display              Procedures  Procedures       Phone Contacts  ED Phone Contact    ED Course                               MDM  Number of Diagnoses or Management Options  Constipation:   Wound check, abscess:   Diagnosis management comments: 80-year-old female here for wound check of abscess status post incision and drainage approximately 2 days ago, patient has been taking antibiotics and denies any signs of infection, packing removed at this time, abscess appears to be healing well without an excess of purulent drainage, will reapply dressing and sent home to continue antibiotics    Initially spoke with patient regarding using Metamucil over-the-counter to as a stool softener    strict return to ED precautions discussed   Pt verbalizes understanding and agrees with plan  Pt is stable for discharge    Portions of the record may have been created with voice recognition software  Occasional wrong word or "sound a like" substitutions may have occurred due to the inherent limitations of voice recognition software  Read the chart carefully and recognize, using context, where substitutions have occurred  CritCare Time    Disposition  Final diagnoses:   Wound check, abscess   Constipation     Time reflects when diagnosis was documented in both MDM as applicable and the Disposition within this note     Time User Action Codes Description Comment    8/11/2018  8:15 AM Pratik Ram Add [Z51 89] Wound check, abscess     8/11/2018  8:16 AM Lowell CEBALLOS Add [K59 00] Constipation       ED Disposition     ED Disposition Condition Comment    Discharge  [address] [facility] discharge to home/self care  Condition at discharge: Good        Follow-up Information     Follow up With Specialties Details Why [facility] Family Medicine Call today If symptoms worsen [address] [place] [address]  [id number]  [phone]          Patient's Medications   Discharge Prescriptions    No medications on file     No discharge procedures on file      ED Provider  Electronically Signed by           Lashon Damon PA-C  08/11/18 0762

## 2018-08-11 NOTE — DISCHARGE INSTRUCTIONS
Constipation   WHAT YOU NEED TO KNOW:   Constipation is when you have hard, dry bowel movements, or you go longer than usual between bowel movements  DISCHARGE INSTRUCTIONS:   Return to the emergency department if:   · You have blood in your bowel movements  · You have a fever and abdominal pain with the constipation  Contact your healthcare provider if:   · Your constipation gets worse  · You start to vomit  · You have questions or concerns about your condition or care  Medicines:   · Medicine or a fiber supplement  may help make your bowel movement softer  A laxative may help relax and loosen your intestines to help you have a bowel movement  You may also be given medicine to increase fluid in your intestines  The fluid may help move bowel movements through your intestines  · Take your medicine as directed  Contact your healthcare provider if you think your medicine is not helping or if you have side effects  Tell him of her if you are allergic to any medicine  Keep a list of the medicines, vitamins, and herbs you take  Include the amounts, and when and why you take them  Bring the list or the pill bottles to follow-up visits  Carry your medicine list with you in case of an emergency  Manage your constipation:   · Drink liquids as directed  You may need to drink extra liquids to help soften and move your bowels  Ask how much liquid to drink each day and which liquids are best for you  · Eat high-fiber foods  This may help decrease constipation by adding bulk to your bowel movements  High-fiber foods include fruit, vegetables, whole-grain breads and cereals, and beans  Your healthcare provider or dietitian can help you create a high-fiber meal plan  · Exercise regularly  Regular physical activity can help stimulate your intestines  Ask which exercises are best for you  · Schedule a time each day to have a bowel movement    This may help train your body to have regular bowel movements  Bend forward while you are on the toilet to help move the bowel movement out  Sit on the toilet for at least 10 minutes, even if you do not have a bowel movement  Follow up with your healthcare provider as directed:  Write down your questions so you remember to ask them during your visits  © 2017 [address] [employer] Information is for End User's use only and may not be sold, redistributed or otherwise used for commercial purposes  All illustrations and images included in CareNotes® are the copyrighted property of [employer]  or [employer]  The above information is an  only  It is not intended as medical advice for individual conditions or treatments  Talk to your doctor, nurse or pharmacist before following any medical regimen to see if it is safe and effective for you  Warm Compress or Soak   WHAT YOU NEED TO KNOW:   A warm compress or soak helps improve blood flow to tissues and relieve pain and swelling  This will help you heal from an injury or illness  You may need a warm compress or soak to help manage any of the following:  · A sinus infection or upper respiratory infection    · A blocked tear duct, eye infection, or a stye    · A skin abscess or infection    · An ingrown toenail    · An ear infection    · A soft or deep tissue injury    · A muscle or joint injury, such as a sprain  DISCHARGE INSTRUCTIONS:   Contact your healthcare provider if:   · Your symptoms do not improve or you have new symptoms  · You see blisters on the area where you applied the compress or soak  · You have questions or concerns about your condition or care  How to prepare and use a moist warm compress: Your healthcare provider will tell you how often to apply a warm compress:  · Wash your hands  · Use a washcloth, small towel, or gauze as your compress  · You can place the compress under running water or place it in a bowl with warm water   Check the temperature of the water with a thermometer  The water should not be warmer than 100°F for babies, 105°F for children, and 120°F for adults  Adults should use water that is 105°F if they will apply the compress to an eye  · If directed, add 1 tablespoon of salt to the water  Squeeze extra water out of the compress  · Place the compress directly on the area  If directed, gently massage the area with the compress  Check your skin in 2 minutes for blisters or bright red skin  Your skin should look pink to light red  · You may need to rewarm the compress every 5 minutes  · Remove the compress in 15 to 30 minutes, or when the compress starts to feel cold  Gently pat your skin dry with a clean towel  · Wash your hands  · Reapply the compress as many times as directed each day  Use a clean compress every time  How to use a dry warm compress:  A dry compress may be a hot water bottle or a heating pad  You can also buy a prepared hot pack  Follow the package directions for how to use these devices  Cover a bottle or hot pack with a towel before you apply it to your skin  Do not leave a dry compress on your skin for more than 20 minutes or as directed  Do not fall asleep with a dry compress on your skin  A dry compress may burn your skin if it is left on for too long  How to prepare and use a warm soak:   · Fill a clean container or tub with warm water and soap  The container should be deep enough to cover the area completely  · Check the temperature of the water with a thermometer  The water should not be warmer than 100°F for children and babies, and 110°F for adults  · If directed, add 1 tablespoon of salt to the water  · Remove any bandages  · Soak the area for 30 minutes or as long as directed  Gently pat your skin dry when you are done soaking  · Replace bandages as directed  · Clean the container or tub when finished  · Wash your hands    Follow up with your healthcare provider as directed:  Write down your questions so you remember to ask them during your visits  © 2017 [address] Information is for End User's use only and may not be sold, redistributed or otherwise used for commercial purposes  All illustrations and images included in CareNotes® are the copyrighted property of [employer]  or Jj Munson  The above information is an  only  It is not intended as medical advice for individual conditions or treatments  Talk to your doctor, nurse or pharmacist before following any medical regimen to see if it is safe and effective for you

## 2018-08-11 NOTE — ED NOTES
Abscess site dressed with 4x4's and tape  No erythema, edema    Small amount of yellow exudate     Ladan Webber RN  08/11/18 4441

## 2018-08-13 LAB
BACTERIA WND AEROBE CULT: NORMAL
GRAM STN SPEC: NORMAL

## 2019-05-08 ENCOUNTER — HOSPITAL ENCOUNTER (EMERGENCY)
Facility: HOSPITAL | Age: 29
Discharge: LEFT BEFORE DISCHARGE | End: 2019-05-08
Attending: EMERGENCY MEDICINE
Payer: COMMERCIAL

## 2019-05-08 VITALS
DIASTOLIC BLOOD PRESSURE: 97 MMHG | OXYGEN SATURATION: 100 % | SYSTOLIC BLOOD PRESSURE: 159 MMHG | RESPIRATION RATE: 15 BRPM | WEIGHT: 133 LBS | BODY MASS INDEX: 23.56 KG/M2 | HEART RATE: 84 BPM | TEMPERATURE: 97.8 F

## 2019-05-08 DIAGNOSIS — K05.219 PERIODONTAL ABSCESS: ICD-10-CM

## 2019-05-08 DIAGNOSIS — K02.9 DENTAL CARIES: ICD-10-CM

## 2019-05-08 DIAGNOSIS — K08.89 PAIN, DENTAL: ICD-10-CM

## 2019-05-08 DIAGNOSIS — K08.89 DENTALGIA: Primary | ICD-10-CM

## 2019-05-08 PROCEDURE — 99282 EMERGENCY DEPT VISIT SF MDM: CPT

## 2019-05-08 PROCEDURE — 99283 EMERGENCY DEPT VISIT LOW MDM: CPT | Performed by: PHYSICIAN ASSISTANT

## 2019-05-08 RX ORDER — CLINDAMYCIN HYDROCHLORIDE 300 MG/1
300 CAPSULE ORAL
Qty: 30 CAPSULE | Refills: 0 | Status: SHIPPED | OUTPATIENT
Start: 2019-05-08 | End: 2019-05-18

## 2019-09-14 ENCOUNTER — HOSPITAL ENCOUNTER (EMERGENCY)
Facility: HOSPITAL | Age: 29
Discharge: HOME | End: 2019-09-14
Attending: EMERGENCY MEDICINE
Payer: COMMERCIAL

## 2019-09-14 VITALS
SYSTOLIC BLOOD PRESSURE: 178 MMHG | OXYGEN SATURATION: 99 % | WEIGHT: 133.16 LBS | RESPIRATION RATE: 16 BRPM | DIASTOLIC BLOOD PRESSURE: 104 MMHG | TEMPERATURE: 96.9 F | BODY MASS INDEX: 23.59 KG/M2 | HEART RATE: 76 BPM

## 2019-09-14 DIAGNOSIS — K04.7 DENTAL INFECTION: ICD-10-CM

## 2019-09-14 DIAGNOSIS — J06.9 VIRAL URI WITH COUGH: Primary | ICD-10-CM

## 2019-09-14 PROCEDURE — 99283 EMERGENCY DEPT VISIT LOW MDM: CPT

## 2019-09-14 PROCEDURE — 99283 EMERGENCY DEPT VISIT LOW MDM: CPT | Performed by: PHYSICIAN ASSISTANT

## 2019-09-14 RX ORDER — ACETAMINOPHEN 500 MG
500 TABLET ORAL EVERY 6 HOURS PRN
Qty: 30 TABLET | Refills: 0 | Status: SHIPPED | OUTPATIENT
Start: 2019-09-14 | End: 2020-02-20

## 2019-09-14 RX ORDER — CLINDAMYCIN HYDROCHLORIDE 150 MG/1
150 CAPSULE ORAL 4 TIMES DAILY
Qty: 40 CAPSULE | Refills: 0 | Status: SHIPPED | OUTPATIENT
Start: 2019-09-14 | End: 2019-09-24

## 2019-09-14 RX ORDER — IBUPROFEN 400 MG/1
400 TABLET ORAL EVERY 6 HOURS PRN
Qty: 20 TABLET | Refills: 0 | Status: SHIPPED | OUTPATIENT
Start: 2019-09-14 | End: 2020-02-20

## 2019-09-14 NOTE — ED PROVIDER NOTES
History  Chief Complaint   Patient presents with    Cough     states that she has had a cough for about 2 weeks  also having nausea    Dental Pain     having rt upper dental pain for over 1 year     Patient is a 31-year-old female presents today with a chief complaint of nasal congestion, sore throat, nonproductive cough over the past 2 days  Patient reports her sore throat is radiating up into her right dental area where she has chronic dental problems and notes the pain there which is aching and throbbing in nature has been exacerbated by the sore throat  Patient reports she has been taking Tylenol with no relief for symptoms  Patient denies any difficulty turning her head or neck and notes no difficulty swallowing or difficulty breathing  Patient reports he has been eating and drinking without problem and denies any chest pain, shortness of breath, abdominal pain, nausea, vomiting, diarrhea  Patient reports he does not have a dentist with whom she is able to follow on a regular basis however notes she just recently obtained health insurance and will be attempting to make a dental appointment in the near future  History provided by:  Patient  Sore Throat   Location:  Generalized  Quality:  Aching  Severity:  Mild  Onset quality:  Gradual  Timing:  Constant  Progression:  Unchanged  Chronicity:  New  Relieved by:  None tried  Worsened by:  Eating  Ineffective treatments:  None tried  Associated symptoms: cough, rhinorrhea and sinus congestion    Associated symptoms: no abdominal pain, no chest pain, no chills, no ear pain, no fever, no neck stiffness, no rash and no shortness of breath        Prior to Admission Medications   Prescriptions Last Dose Informant Patient Reported? Taking?   ibuprofen (MOTRIN) 600 mg tablet   No No   Sig: Take 1 tablet (600 mg total) by mouth every 6 (six) hours as needed (pain)      Facility-Administered Medications: None       History reviewed   No pertinent past medical history  Past Surgical History:   Procedure Laterality Date    TONSILLECTOMY  09/01/2013       Family History   Problem Relation Age of Onset    Hypertension Mother     Diabetes Maternal Grandfather      I have reviewed and agree with the history as documented  Social History     Tobacco Use    Smoking status: Former Smoker    Smokeless tobacco: Never Used    Tobacco comment: quit 3 months ago   Substance Use Topics    Alcohol use: Yes     Comment: rarely    Drug use: No        Review of Systems   Constitutional: Negative for chills, fatigue and fever  HENT: Positive for dental problem, rhinorrhea, sinus pressure and sore throat  Negative for congestion and ear pain  Eyes: Negative for redness  Respiratory: Positive for cough  Negative for chest tightness and shortness of breath  Cardiovascular: Negative for chest pain and palpitations  Gastrointestinal: Negative for abdominal pain, nausea and vomiting  Genitourinary: Negative for dysuria and hematuria  Musculoskeletal: Negative  Negative for neck stiffness  Skin: Negative for rash  Neurological: Negative for dizziness, syncope, light-headedness and numbness  Physical Exam  Physical Exam   Constitutional: She is oriented to person, place, and time  She appears well-developed and well-nourished  HENT:   Head: Normocephalic  Right Ear: Tympanic membrane normal    Left Ear: Tympanic membrane normal    Nose: Mucosal edema present  Mouth/Throat: Uvula is midline  Posterior oropharyngeal erythema ( minimal) present  No oropharyngeal exudate  Tonsils are 0 on the right  Tonsils are 0 on the left  No tonsillar exudate  Eyes: No scleral icterus  Cardiovascular: Normal rate and regular rhythm  Pulmonary/Chest: Effort normal and breath sounds normal  No stridor  Abdominal: Soft  She exhibits no distension  There is no tenderness  Musculoskeletal: Normal range of motion     Neurological: She is alert and oriented to person, place, and time  Skin: Skin is warm and dry  Capillary refill takes less than 2 seconds  Psychiatric: She has a normal mood and affect  Nursing note and vitals reviewed  Vital Signs  ED Triage Vitals [09/14/19 1400]   Temperature Pulse Respirations Blood Pressure SpO2   (!) 96 9 °F (36 1 °C) 76 16 (!) 178/104 99 %      Temp Source Heart Rate Source Patient Position - Orthostatic VS BP Location FiO2 (%)   Tympanic Monitor Sitting Left arm --      Pain Score       9           Vitals:    09/14/19 1400   BP: (!) 178/104   Pulse: 76   Patient Position - Orthostatic VS: Sitting         Visual Acuity      ED Medications  Medications - No data to display    Diagnostic Studies  Results Reviewed     None                 No orders to display              Procedures  Procedures       ED Course                               MDM    Disposition  Final diagnoses:   Viral URI with cough   Dental infection     Time reflects when diagnosis was documented in both MDM as applicable and the Disposition within this note     Time User Action Codes Description Comment    9/14/2019  2:10 PM Gerber Whelan Add [J06 9,  B97 89] Viral URI with cough     9/14/2019  2:10 PM Gerber Whelan Add [K04 7] Dental infection       ED Disposition     ED Disposition Condition Date/Time Comment    Discharge Good Sat Sep 14, 2019  2:10 PM [address] [facility] discharge to home/self care              Follow-up Information     Follow up With Specialties Details Why Contact Info    Cosme Mcnally MD Family Medicine Schedule an appointment as soon as possible for a visit in 2 days As needed [address] [facility] [facility] [address]       [address] [facility]  Schedule an appointment as soon as possible for a visit in 2 days  [address] [facility]          Patient's Medications   Discharge Prescriptions    ACETAMINOPHEN (TYLENOL) 500 MG TABLET    Take 1 tablet (500 mg total) by mouth every 6 (six) hours as needed (pain)       Start Date: 9/14/2019 End Date: --       Order Dose: 500 mg       Quantity: 30 tablet    Refills: 0    CLINDAMYCIN (CLEOCIN) 150 MG CAPSULE    Take 1 capsule (150 mg total) by mouth 4 (four) times a day for 10 days       Start Date: 9/14/2019 End Date: 9/24/2019       Order Dose: 150 mg       Quantity: 40 capsule    Refills: 0    IBUPROFEN (MOTRIN) 400 MG TABLET    Take 1 tablet (400 mg total) by mouth every 6 (six) hours as needed for moderate pain       Start Date: 9/14/2019 End Date: --       Order Dose: 400 mg       Quantity: 20 tablet    Refills: 0    MENTHOL-CETYLPYRIDINIUM (CEPACOL) 3 MG LOZENGE    Take 1 lozenge (3 mg total) by mouth as needed for sore throat       Start Date: 9/14/2019 End Date: --       Order Dose: 3 mg       Quantity: 30 lozenge    Refills: 0     No discharge procedures on file      ED Provider  Electronically Signed by           Tian Ball PA-C  09/14/19 5786

## 2020-02-20 ENCOUNTER — HOSPITAL ENCOUNTER (EMERGENCY)
Facility: HOSPITAL | Age: 30
Discharge: HOME | End: 2020-02-20
Attending: EMERGENCY MEDICINE | Admitting: EMERGENCY MEDICINE
Payer: COMMERCIAL

## 2020-02-20 VITALS
HEART RATE: 90 BPM | RESPIRATION RATE: 16 BRPM | BODY MASS INDEX: 23.38 KG/M2 | SYSTOLIC BLOOD PRESSURE: 131 MMHG | OXYGEN SATURATION: 100 % | DIASTOLIC BLOOD PRESSURE: 89 MMHG | TEMPERATURE: 97.3 F | WEIGHT: 132 LBS

## 2020-02-20 DIAGNOSIS — Z76.89 ENCOUNTER FOR ASSESSMENT OF STD EXPOSURE: Primary | ICD-10-CM

## 2020-02-20 DIAGNOSIS — N39.0 UTI (URINARY TRACT INFECTION): ICD-10-CM

## 2020-02-20 LAB
BACTERIA UR QL AUTO: ABNORMAL /HPF
BILIRUB UR QL STRIP: NEGATIVE
CLARITY UR: CLEAR
COLOR UR: ABNORMAL
EXT PREG TEST URINE: NEGATIVE
EXT. CONTROL ED NAV: NORMAL
GLUCOSE UR STRIP-MCNC: NEGATIVE MG/DL
HGB UR QL STRIP.AUTO: 25
KETONES UR STRIP-MCNC: NEGATIVE MG/DL
LEUKOCYTE ESTERASE UR QL STRIP: 25
MUCOUS THREADS UR QL AUTO: ABNORMAL
NITRITE UR QL STRIP: POSITIVE
NON-SQ EPI CELLS URNS QL MICRO: ABNORMAL /HPF
PH UR STRIP.AUTO: 6 [PH]
PROT UR STRIP-MCNC: NEGATIVE MG/DL
RBC #/AREA URNS AUTO: ABNORMAL /HPF
SP GR UR STRIP.AUTO: 1.01 (ref 1–1.04)
UROBILINOGEN UA: NEGATIVE MG/DL
WBC #/AREA URNS AUTO: ABNORMAL /HPF

## 2020-02-20 PROCEDURE — 81003 URINALYSIS AUTO W/O SCOPE: CPT | Performed by: PHYSICIAN ASSISTANT

## 2020-02-20 PROCEDURE — 96372 THER/PROPH/DIAG INJ SC/IM: CPT

## 2020-02-20 PROCEDURE — 81025 URINE PREGNANCY TEST: CPT | Performed by: PHYSICIAN ASSISTANT

## 2020-02-20 PROCEDURE — 99283 EMERGENCY DEPT VISIT LOW MDM: CPT

## 2020-02-20 PROCEDURE — 99284 EMERGENCY DEPT VISIT MOD MDM: CPT | Performed by: PHYSICIAN ASSISTANT

## 2020-02-20 PROCEDURE — 87491 CHLMYD TRACH DNA AMP PROBE: CPT | Performed by: PHYSICIAN ASSISTANT

## 2020-02-20 PROCEDURE — 81001 URINALYSIS AUTO W/SCOPE: CPT | Performed by: PHYSICIAN ASSISTANT

## 2020-02-20 PROCEDURE — 87591 N.GONORRHOEAE DNA AMP PROB: CPT | Performed by: PHYSICIAN ASSISTANT

## 2020-02-20 RX ORDER — DOXYCYCLINE HYCLATE 100 MG/1
100 CAPSULE ORAL 2 TIMES DAILY
Qty: 20 CAPSULE | Refills: 0 | Status: SHIPPED | OUTPATIENT
Start: 2020-02-20 | End: 2020-03-01

## 2020-02-20 RX ORDER — CEPHALEXIN 500 MG/1
500 CAPSULE ORAL EVERY 8 HOURS SCHEDULED
Qty: 30 CAPSULE | Refills: 0 | Status: SHIPPED | OUTPATIENT
Start: 2020-02-20 | End: 2020-03-01

## 2020-02-20 RX ORDER — DOXYCYCLINE HYCLATE 100 MG/1
100 CAPSULE ORAL ONCE
Status: COMPLETED | OUTPATIENT
Start: 2020-02-20 | End: 2020-02-20

## 2020-02-20 RX ADMIN — DOXYCYCLINE 100 MG: 100 CAPSULE ORAL at 14:00

## 2020-02-20 RX ADMIN — LIDOCAINE HYDROCHLORIDE 250 MG: 10 INJECTION, SOLUTION EPIDURAL; INFILTRATION; INTRACAUDAL; PERINEURAL at 14:00

## 2020-02-20 NOTE — ED PROVIDER NOTES
History  Chief Complaint   Patient presents with    Possible UTI     Patient reports burning while urinating  Patient is a 26-year-old sexually active female who presents today for evaluation of dysuria which has been ongoing for the past 5 days  Patient reports she has been sexually active with a new partner and has not used protection and is concerned for STDs would like testing and treatment at this time today  Patient denies any abdominal pain, flank pain, hematuria, fevers, chills, nausea, vomiting, diarrhea, constipation  Patient reports she does have an OB gyn with whom she is able to follow      History provided by:  Patient  Difficulty Urinating   Pain quality:  Burning  Pain severity:  Moderate  Onset quality:  Gradual  Duration:  5 days  Timing:  Constant  Progression:  Unchanged  Chronicity:  New  Recent urinary tract infections: no    Relieved by:  None tried  Worsened by:  Nothing  Ineffective treatments:  None tried  Urinary symptoms: frequent urination    Associated symptoms: no abdominal pain, no fever, no flank pain, no genital lesions, no nausea, no vaginal discharge and no vomiting        None       History reviewed  No pertinent past medical history  Past Surgical History:   Procedure Laterality Date    TONSILLECTOMY  09/01/2013       Family History   Problem Relation Age of Onset    Hypertension Mother     Diabetes Maternal Grandfather      I have reviewed and agree with the history as documented  Social History     Tobacco Use    Smoking status: Former Smoker    Smokeless tobacco: Never Used    Tobacco comment: quit 3 months ago   Substance Use Topics    Alcohol use: Yes     Comment: rarely    Drug use: No       Review of Systems   Constitutional: Negative for chills, fatigue and fever  HENT: Negative for congestion, ear pain, rhinorrhea and sore throat  Eyes: Negative for redness  Respiratory: Negative for chest tightness and shortness of breath  Cardiovascular: Negative for chest pain and palpitations  Gastrointestinal: Negative for abdominal pain, nausea and vomiting  Genitourinary: Positive for difficulty urinating, dysuria, frequency and vaginal bleeding ( patient reports she is currently on her period)  Negative for dyspareunia, enuresis, flank pain, genital sores, hematuria, menstrual problem, pelvic pain, urgency, vaginal discharge and vaginal pain  Musculoskeletal: Negative  Skin: Negative for rash  Neurological: Negative for dizziness, syncope, light-headedness and numbness  Physical Exam  Physical Exam   Constitutional: She is oriented to person, place, and time  She appears well-developed and well-nourished  HENT:   Head: Normocephalic  Eyes: No scleral icterus  Cardiovascular: Normal rate and regular rhythm  Pulmonary/Chest: Effort normal and breath sounds normal  No stridor  Abdominal: Soft  She exhibits no distension  There is no tenderness  Benign abdominal exam  Normal bowel sounds auscultated in all 4 quadrants  No tenderness to palpation, both light and deep in all 4 quadrants  Musculoskeletal: Normal range of motion  Neurological: She is alert and oriented to person, place, and time  Skin: Skin is warm and dry  Capillary refill takes less than 2 seconds  Psychiatric: She has a normal mood and affect  Nursing note and vitals reviewed        Vital Signs  ED Triage Vitals   Temperature Pulse Respirations Blood Pressure SpO2   02/20/20 1339 02/20/20 1339 02/20/20 1339 02/20/20 1339 02/20/20 1339   (!) 97 3 °F (36 3 °C) 90 16 131/89 100 %      Temp src Heart Rate Source Patient Position - Orthostatic VS BP Location FiO2 (%)   -- -- 02/20/20 1339 02/20/20 1339 --     Sitting Left arm       Pain Score       02/20/20 1439       No Pain           Vitals:    02/20/20 1339   BP: 131/89   Pulse: 90   Patient Position - Orthostatic VS: Sitting         Visual Acuity      ED Medications  Medications   cefTRIAXone (ROCEPHIN) 250 mg in lidocaine (PF) (XYLOCAINE-MPF) 1 % IM only syringe (250 mg Intramuscular Given 2/20/20 1400)   doxycycline hyclate (VIBRAMYCIN) capsule 100 mg (100 mg Oral Given 2/20/20 1400)       Diagnostic Studies  Results Reviewed     Procedure Component Value Units Date/Time    Urine Microscopic [[id number]]  (Abnormal) Collected:  02/20/20 1352    Lab Status:  Final result Specimen:  Urine, Clean Catch Updated:  02/20/20 1431     RBC, UA 0-1 /hpf      WBC, UA 2-4 /hpf      Epithelial Cells Occasional /hpf      Bacteria, UA Innumerable /hpf      MUCUS THREADS Occasional    UA w Reflex to Microscopic w Reflex to Culture [[id number]]  (Abnormal) Collected:  02/20/20 1352    Lab Status:  Final result Specimen:  Urine, Clean Catch Updated:  02/20/20 1414     Color, UA Straw     Clarity, UA Clear     Specific Gravity, UA 1 015     pH, UA 6 0     Leukocytes, UA 25 0     Nitrite, UA Positive     Protein, UA Negative mg/dl      Glucose, UA Negative mg/dl      Ketones, UA Negative mg/dl      Bilirubin, UA Negative     Blood, UA 25 0     UROBILINOGEN UA Negative mg/dL     Chlamydia/GC amplified DNA by PCR [[id number]] Collected:  02/20/20 1351    Lab Status: In process Specimen:  Urine, Other Updated:  02/20/20 1359    POCT pregnancy, urine [[id number]]  (Normal) Resulted:  02/20/20 1359    Lab Status:  Final result Updated:  02/20/20 1359     EXT PREG TEST UR (Ref: Negative) negative     Control valid                 No orders to display              Procedures  Procedures         ED Course  ED Course as of Feb 20 1639   Thu Feb 20, 2020   1423 Nitrite, UA(!): Positive                               MDM  Number of Diagnoses or Management Options  Encounter for assessment of STD exposure:   UTI (urinary tract infection):   Diagnosis management comments: Patient allergic to Azithromycin, will cover for GC with Doxycycline instead BID 100mg, PO, x 10 days          Disposition  Final diagnoses:   Encounter for assessment of STD exposure   UTI (urinary tract infection)     Time reflects when diagnosis was documented in both MDM as applicable and the Disposition within this note     Time User Action Codes Description Comment    2/20/2020  2:29 PM Eletha Pair Add [R30 0] Dysuria     2/20/2020  2:29 PM Eletha Pair Add [Z76 89] Encounter for assessment of STD exposure     2/20/2020  2:30 PM DonJoshua georgevirgen Pan Anupama Jaylen Isaacs [N39 0] UTI (urinary tract infection)     2/20/2020  2:30 PM Tori Andres, [address] [facility] [address] [Z76 89] Encounter for assessment of STD exposure     2/20/2020  2:30 PM Eletha Pair Remove [R30 0] Dysuria       ED Disposition     ED Disposition Condition Date/Time Comment    Discharge Good Thu Feb 20, 2020  2:29 PM [address] [facility] discharge to home/self care  Follow-up Information     Follow up With Specialties Details Why Contact Info    Eva Morales DO Family Medicine Schedule an appointment as soon as possible for a visit  As needed [address]      [address] [facility] Obstetrics and Gynecology Schedule an appointment as soon as possible for a visit in 2 days  [address] [facility] [address] [place] [address]  [phone]            Discharge Medication List as of 2/20/2020  2:31 PM      START taking these medications    Details   cephalexin (KEFLEX) 500 mg capsule Take 1 capsule (500 mg total) by mouth every 8 (eight) hours for 10 days, Starting Thu 2/20/2020, Until Sun 3/1/2020, Print      doxycycline hyclate (VIBRAMYCIN) 100 mg capsule Take 1 capsule (100 mg total) by mouth 2 (two) times a day for 10 days, Starting Thu 2/20/2020, Until Sun 3/1/2020, Print           No discharge procedures on file      PDMP Review     None          ED Provider  Electronically Signed by           Barrie Eagle PA-C  02/20/20 7890

## 2020-02-24 LAB
C TRACH DNA SPEC QL NAA+PROBE: NEGATIVE
N GONORRHOEA DNA SPEC QL NAA+PROBE: NEGATIVE

## 2020-03-24 ENCOUNTER — TELEPHONE (OUTPATIENT)
Dept: FAMILY MEDICINE CLINIC | Facility: CLINIC | Age: 30
End: 2020-03-24

## 2020-09-18 ENCOUNTER — OFFICE VISIT (OUTPATIENT)
Dept: FAMILY MEDICINE CLINIC | Facility: CLINIC | Age: 30
End: 2020-09-18
Payer: COMMERCIAL

## 2020-09-18 VITALS
DIASTOLIC BLOOD PRESSURE: 80 MMHG | BODY MASS INDEX: 24.14 KG/M2 | TEMPERATURE: 98.1 F | SYSTOLIC BLOOD PRESSURE: 120 MMHG | RESPIRATION RATE: 18 BRPM | WEIGHT: 141.4 LBS | HEIGHT: 64 IN | HEART RATE: 80 BPM

## 2020-09-18 DIAGNOSIS — K21.9 GASTROESOPHAGEAL REFLUX DISEASE WITHOUT ESOPHAGITIS: ICD-10-CM

## 2020-09-18 DIAGNOSIS — D68.51 HETEROZYGOUS FACTOR V LEIDEN MUTATION (HCC): ICD-10-CM

## 2020-09-18 DIAGNOSIS — G81.14 SPASTIC HEMIPLEGIA AFFECTING LEFT NONDOMINANT SIDE, UNSPECIFIED ETIOLOGY (HCC): Primary | ICD-10-CM

## 2020-09-18 DIAGNOSIS — F32.A DEPRESSION, UNSPECIFIED DEPRESSION TYPE: ICD-10-CM

## 2020-09-18 DIAGNOSIS — G47.00 INSOMNIA, UNSPECIFIED TYPE: ICD-10-CM

## 2020-09-18 DIAGNOSIS — K59.00 CONSTIPATION, UNSPECIFIED CONSTIPATION TYPE: ICD-10-CM

## 2020-09-18 DIAGNOSIS — I63.511 ACUTE ISCHEMIC RIGHT MCA STROKE (HCC): ICD-10-CM

## 2020-09-18 PROBLEM — R77.8 ELEVATED TROPONIN: Status: ACTIVE | Noted: 2020-08-05

## 2020-09-18 PROBLEM — R79.89 ELEVATED TROPONIN: Status: ACTIVE | Noted: 2020-08-05

## 2020-09-18 PROBLEM — G81.94 HEMIPLEGIA AFFECTING LEFT NONDOMINANT SIDE (HCC): Status: ACTIVE | Noted: 2020-08-05

## 2020-09-18 PROBLEM — I63.81 STROKE OF RIGHT BASAL GANGLIA (HCC): Status: ACTIVE | Noted: 2020-08-05

## 2020-09-18 PROCEDURE — 99203 OFFICE O/P NEW LOW 30 MIN: CPT | Performed by: NURSE PRACTITIONER

## 2020-09-18 RX ORDER — ASPIRIN 81 MG/1
81 TABLET, CHEWABLE ORAL DAILY
COMMUNITY
Start: 2020-08-15 | End: 2020-10-02 | Stop reason: ALTCHOICE

## 2020-09-18 RX ORDER — AMLODIPINE BESYLATE 5 MG/1
5 TABLET ORAL DAILY
Start: 2020-09-18 | End: 2020-10-02 | Stop reason: SDUPTHER

## 2020-09-18 RX ORDER — SENNOSIDES 8.6 MG
2 TABLET ORAL
Qty: 120 EACH | Refills: 0 | Status: SHIPPED | OUTPATIENT
Start: 2020-09-18 | End: 2020-09-18 | Stop reason: ALTCHOICE

## 2020-09-18 RX ORDER — LANOLIN ALCOHOL/MO/W.PET/CERES
3 CREAM (GRAM) TOPICAL
Refills: 0
Start: 2020-09-18 | End: 2021-05-25 | Stop reason: SDUPTHER

## 2020-09-18 RX ORDER — ATORVASTATIN CALCIUM 80 MG/1
80 TABLET, FILM COATED ORAL DAILY
Qty: 30 TABLET | Refills: 5
Start: 2020-09-18 | End: 2020-10-02 | Stop reason: SDUPTHER

## 2020-09-18 RX ORDER — ATORVASTATIN CALCIUM 80 MG/1
80 TABLET, FILM COATED ORAL DAILY
COMMUNITY
Start: 2020-08-14 | End: 2020-10-02 | Stop reason: SDUPTHER

## 2020-09-18 RX ORDER — FAMOTIDINE 20 MG/1
20 TABLET, FILM COATED ORAL 2 TIMES DAILY
Start: 2020-09-18 | End: 2020-10-02 | Stop reason: SDUPTHER

## 2020-09-18 RX ORDER — MIDODRINE HYDROCHLORIDE 2.5 MG/1
2.5 TABLET ORAL 2 TIMES DAILY
COMMUNITY
Start: 2020-08-14 | End: 2020-09-18 | Stop reason: ALTCHOICE

## 2020-09-18 RX ORDER — DOCUSATE SODIUM 100 MG/1
100 CAPSULE, LIQUID FILLED ORAL 2 TIMES DAILY
Qty: 10 CAPSULE | Refills: 0
Start: 2020-09-18 | End: 2021-12-26

## 2020-09-18 RX ORDER — BACLOFEN 10 MG/1
10 TABLET ORAL 4 TIMES DAILY
Refills: 0
Start: 2020-09-18 | End: 2021-12-26

## 2020-09-18 RX ORDER — FLUOXETINE HYDROCHLORIDE 20 MG/1
20 CAPSULE ORAL DAILY
Start: 2020-09-18 | End: 2020-10-02 | Stop reason: SDUPTHER

## 2020-09-18 NOTE — PROGRESS NOTES
Assessment/Plan:    No problem-specific Assessment & Plan notes found for this encounter  Diagnoses and all orders for this visit:    Spastic hemiplegia affecting left nondominant side, unspecified etiology ([address] )    Depression, unspecified depression type  -     FLUoxetine (PROzac) 20 mg capsule; Take 1 capsule (20 mg total) by mouth daily    Constipation, unspecified constipation type  -     Discontinue: senna (SENOKOT) 8 6 mg; Take 2 tablets (17 2 mg total) by mouth daily at bedtime    Acute ischemic right MCA stroke (HCC)    Other orders  -     aspirin 81 mg chewable tablet; Chew 81 mg daily  -     atorvastatin (LIPITOR) 80 mg tablet; Take 80 mg by mouth daily  -     Discontinue: midodrine (PROAMATINE) 2 5 mg tablet; Take 2 5 mg by mouth 2 (two) times a day          Subjective:      Patient ID: Yamileth Goodman is a 27 y o  female  HPI  Here for follow    Had weakness on left side   Went to [facility] and had a CVA 8/4 -8/14   Then [facility] form 8//14-9/4  Acute ischemic right MCA stroke    Unknown cause    Had thrombectomy   Thought to have factor V Leiden   Had clot after thrombectomy   Have see cardiology and getting a loop recorder   Will be present for 3 years      Still due to see neurology    [facility] and [facility] and has follow up with them     Cont left sided weakness and using a can   Speech is ok        The following portions of the patient's history were reviewed and updated as appropriate: allergies, current medications, past family history, past medical history, past social history, past surgical history and problem list     Review of Systems      Objective:  Vitals:    09/18/20 1418 09/18/20 1420   BP:  120/80   BP Location:  Left arm   Patient Position:  Sitting   Cuff Size:  Standard   Pulse:  80   Resp:  18   Temp: 98 1 °F (36 7 °C) 98 1 °F (36 7 °C)   TempSrc: Temporal Temporal   Weight: 64 1 kg (141 lb 6 4 oz) 64 1 kg (141 lb 6 4 oz)   Height: 5' 3 5" (1 613 m) 5' 3 5" (1 613 m) Physical Exam  Vitals signs reviewed  Constitutional:       Appearance: Normal appearance  She is normal weight  HENT:      Right Ear: Tympanic membrane, ear canal and external ear normal       Left Ear: Tympanic membrane, ear canal and external ear normal       Nose: Nose normal       Mouth/Throat:      Pharynx: Oropharynx is clear  Eyes:      Conjunctiva/sclera: Conjunctivae normal    Neck:      Musculoskeletal: Normal range of motion and neck supple  Cardiovascular:      Rate and Rhythm: Normal rate and regular rhythm  Heart sounds: Normal heart sounds  Abdominal:      General: Bowel sounds are normal       Palpations: Abdomen is soft  Musculoskeletal:      Comments: Left arm with spastic movement but able to raise above shoulder  No        Left leg able to lift   Walking with a cane    Skin:     General: Skin is warm and dry  Neurological:      Mental Status: She is alert and oriented to person, place, and time  Motor: Weakness present        Gait: Gait abnormal    Psychiatric:         Behavior: Behavior normal

## 2020-10-02 DIAGNOSIS — K21.9 GASTROESOPHAGEAL REFLUX DISEASE WITHOUT ESOPHAGITIS: ICD-10-CM

## 2020-10-02 DIAGNOSIS — F32.A DEPRESSION, UNSPECIFIED DEPRESSION TYPE: ICD-10-CM

## 2020-10-02 DIAGNOSIS — I63.511 ACUTE ISCHEMIC RIGHT MCA STROKE (HCC): ICD-10-CM

## 2020-10-02 DIAGNOSIS — G81.14 SPASTIC HEMIPLEGIA AFFECTING LEFT NONDOMINANT SIDE, UNSPECIFIED ETIOLOGY (HCC): Primary | ICD-10-CM

## 2020-10-02 RX ORDER — ATORVASTATIN CALCIUM 80 MG/1
80 TABLET, FILM COATED ORAL DAILY
Qty: 30 TABLET | Refills: 3 | Status: SHIPPED | OUTPATIENT
Start: 2020-10-02 | End: 2020-10-06 | Stop reason: SDUPTHER

## 2020-10-02 RX ORDER — FLUOXETINE HYDROCHLORIDE 20 MG/1
20 CAPSULE ORAL DAILY
Qty: 30 CAPSULE | Refills: 3 | Status: SHIPPED | OUTPATIENT
Start: 2020-10-02 | End: 2020-10-06 | Stop reason: SDUPTHER

## 2020-10-02 RX ORDER — FAMOTIDINE 20 MG/1
20 TABLET, FILM COATED ORAL 2 TIMES DAILY
Qty: 30 TABLET | Refills: 3 | Status: SHIPPED | OUTPATIENT
Start: 2020-10-02 | End: 2020-10-06 | Stop reason: SDUPTHER

## 2020-10-02 RX ORDER — AMLODIPINE BESYLATE 5 MG/1
5 TABLET ORAL DAILY
Qty: 30 TABLET | Refills: 3 | Status: SHIPPED | OUTPATIENT
Start: 2020-10-02 | End: 2020-10-06 | Stop reason: SDUPTHER

## 2020-10-06 DIAGNOSIS — F32.A DEPRESSION, UNSPECIFIED DEPRESSION TYPE: ICD-10-CM

## 2020-10-06 DIAGNOSIS — K21.9 GASTROESOPHAGEAL REFLUX DISEASE WITHOUT ESOPHAGITIS: ICD-10-CM

## 2020-10-06 DIAGNOSIS — T78.40XD ALLERGY, SUBSEQUENT ENCOUNTER: Primary | ICD-10-CM

## 2020-10-06 DIAGNOSIS — G81.14 SPASTIC HEMIPLEGIA AFFECTING LEFT NONDOMINANT SIDE, UNSPECIFIED ETIOLOGY (HCC): ICD-10-CM

## 2020-10-06 DIAGNOSIS — D68.51 HETEROZYGOUS FACTOR V LEIDEN MUTATION (HCC): ICD-10-CM

## 2020-10-06 DIAGNOSIS — I63.511 ACUTE ISCHEMIC RIGHT MCA STROKE (HCC): ICD-10-CM

## 2020-10-06 RX ORDER — FAMOTIDINE 20 MG/1
20 TABLET, FILM COATED ORAL 2 TIMES DAILY
Qty: 60 TABLET | Refills: 5 | Status: SHIPPED | OUTPATIENT
Start: 2020-10-06 | End: 2020-12-23 | Stop reason: SDUPTHER

## 2020-10-06 RX ORDER — AMLODIPINE BESYLATE 5 MG/1
5 TABLET ORAL DAILY
Qty: 30 TABLET | Refills: 5 | Status: SHIPPED | OUTPATIENT
Start: 2020-10-06 | End: 2021-02-04 | Stop reason: SDUPTHER

## 2020-10-06 RX ORDER — ATORVASTATIN CALCIUM 80 MG/1
80 TABLET, FILM COATED ORAL DAILY
Qty: 30 TABLET | Refills: 5 | Status: SHIPPED | OUTPATIENT
Start: 2020-10-06 | End: 2021-02-04 | Stop reason: SDUPTHER

## 2020-10-06 RX ORDER — LORATADINE 10 MG/1
10 TABLET ORAL DAILY
Qty: 30 TABLET | Refills: 5 | Status: SHIPPED | OUTPATIENT
Start: 2020-10-06 | End: 2021-12-26

## 2020-10-06 RX ORDER — FLUOXETINE HYDROCHLORIDE 20 MG/1
20 CAPSULE ORAL DAILY
Qty: 30 CAPSULE | Refills: 5 | Status: SHIPPED | OUTPATIENT
Start: 2020-10-06 | End: 2021-02-04 | Stop reason: SDUPTHER

## 2020-10-07 ENCOUNTER — APPOINTMENT (EMERGENCY)
Dept: CT IMAGING | Facility: HOSPITAL | Age: 30
End: 2020-10-07
Payer: COMMERCIAL

## 2020-10-07 ENCOUNTER — APPOINTMENT (EMERGENCY)
Dept: RADIOLOGY | Facility: HOSPITAL | Age: 30
End: 2020-10-07
Payer: COMMERCIAL

## 2020-10-07 ENCOUNTER — HOSPITAL ENCOUNTER (EMERGENCY)
Facility: HOSPITAL | Age: 30
Discharge: HOME | End: 2020-10-08
Attending: EMERGENCY MEDICINE | Admitting: EMERGENCY MEDICINE
Payer: COMMERCIAL

## 2020-10-07 DIAGNOSIS — R10.9 ABDOMINAL PAIN: Primary | ICD-10-CM

## 2020-10-07 DIAGNOSIS — A59.9 TRICHOMONIASIS: ICD-10-CM

## 2020-10-07 LAB
ALBUMIN SERPL BCP-MCNC: 3.6 G/DL (ref 3.5–5)
ALP SERPL-CCNC: 139 U/L (ref 46–116)
ALT SERPL W P-5'-P-CCNC: 35 U/L (ref 12–78)
ANION GAP SERPL CALCULATED.3IONS-SCNC: 7 MMOL/L (ref 4–13)
APTT PPP: 33 SECONDS (ref 23–37)
AST SERPL W P-5'-P-CCNC: 12 U/L (ref 5–45)
BACTERIA UR QL AUTO: ABNORMAL /HPF
BASOPHILS # BLD AUTO: 0.04 THOUSANDS/ΜL (ref 0–0.1)
BASOPHILS NFR BLD AUTO: 0 % (ref 0–1)
BILIRUB SERPL-MCNC: 0.57 MG/DL (ref 0.2–1)
BILIRUB UR QL STRIP: NEGATIVE
BUN SERPL-MCNC: 11 MG/DL (ref 5–25)
CALCIUM SERPL-MCNC: 8.9 MG/DL (ref 8.3–10.1)
CAOX CRY URNS QL MICRO: ABNORMAL /HPF
CHLORIDE SERPL-SCNC: 103 MMOL/L (ref 100–108)
CLARITY UR: ABNORMAL
CO2 SERPL-SCNC: 29 MMOL/L (ref 21–32)
COLOR UR: YELLOW
CREAT SERPL-MCNC: 0.82 MG/DL (ref 0.6–1.3)
D DIMER PPP FEU-MCNC: 0.86 UG/ML FEU
EOSINOPHIL # BLD AUTO: 0.12 THOUSAND/ΜL (ref 0–0.61)
EOSINOPHIL NFR BLD AUTO: 1 % (ref 0–6)
ERYTHROCYTE [DISTWIDTH] IN BLOOD BY AUTOMATED COUNT: 13.6 % (ref 11.6–15.1)
EXT PREG TEST URINE: NEGATIVE
EXT. CONTROL ED NAV: NORMAL
GFR SERPL CREATININE-BSD FRML MDRD: 111 ML/MIN/1.73SQ M
GLUCOSE SERPL-MCNC: 78 MG/DL (ref 65–140)
GLUCOSE UR STRIP-MCNC: NEGATIVE MG/DL
HCT VFR BLD AUTO: 39.4 % (ref 34.8–46.1)
HGB BLD-MCNC: 12.7 G/DL (ref 11.5–15.4)
HGB UR QL STRIP.AUTO: ABNORMAL
HOLD SPECIMEN: YES
IMM GRANULOCYTES # BLD AUTO: 0.04 THOUSAND/UL (ref 0–0.2)
IMM GRANULOCYTES NFR BLD AUTO: 0 % (ref 0–2)
INR PPP: 1.06 (ref 0.84–1.19)
KETONES UR STRIP-MCNC: NEGATIVE MG/DL
LEUKOCYTE ESTERASE UR QL STRIP: ABNORMAL
LIPASE SERPL-CCNC: 111 U/L (ref 73–393)
LYMPHOCYTES # BLD AUTO: 2.44 THOUSANDS/ΜL (ref 0.6–4.47)
LYMPHOCYTES NFR BLD AUTO: 20 % (ref 14–44)
MCH RBC QN AUTO: 30 PG (ref 26.8–34.3)
MCHC RBC AUTO-ENTMCNC: 32.2 G/DL (ref 31.4–37.4)
MCV RBC AUTO: 93 FL (ref 82–98)
MONOCYTES # BLD AUTO: 1.29 THOUSAND/ΜL (ref 0.17–1.22)
MONOCYTES NFR BLD AUTO: 11 % (ref 4–12)
NEUTROPHILS # BLD AUTO: 8.4 THOUSANDS/ΜL (ref 1.85–7.62)
NEUTS SEG NFR BLD AUTO: 68 % (ref 43–75)
NITRITE UR QL STRIP: NEGATIVE
NON-SQ EPI CELLS URNS QL MICRO: ABNORMAL /HPF
NRBC BLD AUTO-RTO: 0 /100 WBCS
OTHER STN SPEC: ABNORMAL
PH UR STRIP.AUTO: 5.5 [PH] (ref 4.5–8)
PLATELET # BLD AUTO: 372 THOUSANDS/UL (ref 149–390)
PMV BLD AUTO: 8.7 FL (ref 8.9–12.7)
POTASSIUM SERPL-SCNC: 3.8 MMOL/L (ref 3.5–5.3)
PROT SERPL-MCNC: 8.1 G/DL (ref 6.4–8.2)
PROT UR STRIP-MCNC: NEGATIVE MG/DL
PROTHROMBIN TIME: 13.9 SECONDS (ref 11.6–14.5)
RBC # BLD AUTO: 4.24 MILLION/UL (ref 3.81–5.12)
RBC #/AREA URNS AUTO: ABNORMAL /HPF
SODIUM SERPL-SCNC: 139 MMOL/L (ref 136–145)
SP GR UR STRIP.AUTO: >=1.03 (ref 1–1.03)
UROBILINOGEN UR QL STRIP.AUTO: 0.2 E.U./DL
WBC # BLD AUTO: 12.33 THOUSAND/UL (ref 4.31–10.16)
WBC #/AREA URNS AUTO: ABNORMAL /HPF

## 2020-10-07 PROCEDURE — G1004 CDSM NDSC: HCPCS

## 2020-10-07 PROCEDURE — 85025 COMPLETE CBC W/AUTO DIFF WBC: CPT | Performed by: EMERGENCY MEDICINE

## 2020-10-07 PROCEDURE — 74177 CT ABD & PELVIS W/CONTRAST: CPT

## 2020-10-07 PROCEDURE — 71045 X-RAY EXAM CHEST 1 VIEW: CPT

## 2020-10-07 PROCEDURE — 81001 URINALYSIS AUTO W/SCOPE: CPT

## 2020-10-07 PROCEDURE — 85610 PROTHROMBIN TIME: CPT | Performed by: EMERGENCY MEDICINE

## 2020-10-07 PROCEDURE — 71275 CT ANGIOGRAPHY CHEST: CPT

## 2020-10-07 PROCEDURE — 99285 EMERGENCY DEPT VISIT HI MDM: CPT | Performed by: EMERGENCY MEDICINE

## 2020-10-07 PROCEDURE — 80053 COMPREHEN METABOLIC PANEL: CPT | Performed by: EMERGENCY MEDICINE

## 2020-10-07 PROCEDURE — 85730 THROMBOPLASTIN TIME PARTIAL: CPT | Performed by: EMERGENCY MEDICINE

## 2020-10-07 PROCEDURE — 96374 THER/PROPH/DIAG INJ IV PUSH: CPT

## 2020-10-07 PROCEDURE — 85379 FIBRIN DEGRADATION QUANT: CPT | Performed by: EMERGENCY MEDICINE

## 2020-10-07 PROCEDURE — 83690 ASSAY OF LIPASE: CPT | Performed by: EMERGENCY MEDICINE

## 2020-10-07 PROCEDURE — 81025 URINE PREGNANCY TEST: CPT | Performed by: EMERGENCY MEDICINE

## 2020-10-07 PROCEDURE — 36415 COLL VENOUS BLD VENIPUNCTURE: CPT

## 2020-10-07 PROCEDURE — 99284 EMERGENCY DEPT VISIT MOD MDM: CPT

## 2020-10-07 RX ORDER — MORPHINE SULFATE 4 MG/ML
4 INJECTION, SOLUTION INTRAMUSCULAR; INTRAVENOUS ONCE
Status: COMPLETED | OUTPATIENT
Start: 2020-10-07 | End: 2020-10-07

## 2020-10-07 RX ADMIN — MORPHINE SULFATE 4 MG: 4 INJECTION INTRAVENOUS at 22:45

## 2020-10-07 RX ADMIN — IOHEXOL 100 ML: 350 INJECTION, SOLUTION INTRAVENOUS at 23:23

## 2020-10-08 VITALS
WEIGHT: 136.91 LBS | SYSTOLIC BLOOD PRESSURE: 148 MMHG | OXYGEN SATURATION: 99 % | TEMPERATURE: 98.5 F | HEART RATE: 100 BPM | DIASTOLIC BLOOD PRESSURE: 94 MMHG | BODY MASS INDEX: 23.87 KG/M2 | RESPIRATION RATE: 17 BRPM

## 2020-10-08 PROCEDURE — 96375 TX/PRO/DX INJ NEW DRUG ADDON: CPT

## 2020-10-08 RX ORDER — MAGNESIUM HYDROXIDE/ALUMINUM HYDROXICE/SIMETHICONE 120; 1200; 1200 MG/30ML; MG/30ML; MG/30ML
30 SUSPENSION ORAL ONCE
Status: COMPLETED | OUTPATIENT
Start: 2020-10-08 | End: 2020-10-08

## 2020-10-08 RX ORDER — METRONIDAZOLE 500 MG/1
500 TABLET ORAL EVERY 12 HOURS SCHEDULED
Qty: 14 TABLET | Refills: 0 | Status: SHIPPED | OUTPATIENT
Start: 2020-10-08 | End: 2020-10-15

## 2020-10-08 RX ORDER — LIDOCAINE HYDROCHLORIDE 20 MG/ML
10 SOLUTION OROPHARYNGEAL ONCE
Status: COMPLETED | OUTPATIENT
Start: 2020-10-08 | End: 2020-10-08

## 2020-10-08 RX ORDER — SUCRALFATE ORAL 1 G/10ML
1 SUSPENSION ORAL 4 TIMES DAILY
Qty: 400 ML | Refills: 0 | Status: SHIPPED | OUTPATIENT
Start: 2020-10-08 | End: 2021-12-26

## 2020-10-08 RX ORDER — POLYETHYLENE GLYCOL 3350 17 G/17G
17 POWDER, FOR SOLUTION ORAL DAILY
Qty: 119 G | Refills: 0 | Status: SHIPPED | OUTPATIENT
Start: 2020-10-08 | End: 2020-12-11

## 2020-10-08 RX ORDER — PANTOPRAZOLE SODIUM 40 MG/1
40 TABLET, DELAYED RELEASE ORAL DAILY
Qty: 14 TABLET | Refills: 0 | Status: SHIPPED | OUTPATIENT
Start: 2020-10-08 | End: 2020-12-11

## 2020-10-08 RX ORDER — FENTANYL CITRATE 50 UG/ML
50 INJECTION, SOLUTION INTRAMUSCULAR; INTRAVENOUS ONCE
Status: COMPLETED | OUTPATIENT
Start: 2020-10-08 | End: 2020-10-08

## 2020-10-08 RX ADMIN — LIDOCAINE HYDROCHLORIDE 10 ML: 20 SOLUTION ORAL; TOPICAL at 00:44

## 2020-10-08 RX ADMIN — FENTANYL CITRATE 50 MCG: 50 INJECTION INTRAMUSCULAR; INTRAVENOUS at 00:47

## 2020-10-08 RX ADMIN — ALUMINUM HYDROXIDE, MAGNESIUM HYDROXIDE, AND SIMETHICONE 30 ML: 200; 200; 20 SUSPENSION ORAL at 00:44

## 2020-11-05 ENCOUNTER — APPOINTMENT (OUTPATIENT)
Dept: RADIOLOGY | Age: 30
End: 2020-11-05
Payer: COMMERCIAL

## 2020-11-05 ENCOUNTER — OFFICE VISIT (OUTPATIENT)
Dept: URGENT CARE | Age: 30
End: 2020-11-05
Payer: COMMERCIAL

## 2020-11-05 ENCOUNTER — TELEPHONE (OUTPATIENT)
Dept: FAMILY MEDICINE CLINIC | Facility: CLINIC | Age: 30
End: 2020-11-05

## 2020-11-05 ENCOUNTER — LAB (OUTPATIENT)
Dept: LAB | Age: 30
End: 2020-11-05
Payer: COMMERCIAL

## 2020-11-05 VITALS
HEART RATE: 100 BPM | RESPIRATION RATE: 18 BRPM | WEIGHT: 129 LBS | TEMPERATURE: 99.6 F | BODY MASS INDEX: 23.74 KG/M2 | HEIGHT: 62 IN | OXYGEN SATURATION: 97 %

## 2020-11-05 DIAGNOSIS — J40 BRONCHITIS: ICD-10-CM

## 2020-11-05 DIAGNOSIS — D50.0 IRON DEFICIENCY ANEMIA DUE TO CHRONIC BLOOD LOSS: Primary | ICD-10-CM

## 2020-11-05 DIAGNOSIS — J32.9 SINUSITIS, UNSPECIFIED CHRONICITY, UNSPECIFIED LOCATION: Primary | ICD-10-CM

## 2020-11-05 DIAGNOSIS — I63.511 ACUTE ISCHEMIC RIGHT MCA STROKE (HCC): ICD-10-CM

## 2020-11-05 DIAGNOSIS — J11.1 INFLUENZA: ICD-10-CM

## 2020-11-05 DIAGNOSIS — Z20.822 ENCOUNTER FOR LABORATORY TESTING FOR COVID-19 VIRUS: ICD-10-CM

## 2020-11-05 DIAGNOSIS — R32 URINARY INCONTINENCE, UNSPECIFIED TYPE: ICD-10-CM

## 2020-11-05 DIAGNOSIS — Z11.59 SPECIAL SCREENING EXAMINATION FOR VIRAL DISEASE: ICD-10-CM

## 2020-11-05 DIAGNOSIS — N92.2 EXCESSIVE MENSTRUATION AT PUBERTY: ICD-10-CM

## 2020-11-05 DIAGNOSIS — N92.2 EXCESSIVE MENSTRUATION AT PUBERTY: Primary | ICD-10-CM

## 2020-11-05 LAB
ALBUMIN SERPL BCP-MCNC: 3.2 G/DL (ref 3.5–5)
ALP SERPL-CCNC: 143 U/L (ref 46–116)
ALT SERPL W P-5'-P-CCNC: 18 U/L (ref 12–78)
AMORPH URATE CRY URNS QL MICRO: ABNORMAL /HPF
ANION GAP SERPL CALCULATED.3IONS-SCNC: 4 MMOL/L (ref 4–13)
AST SERPL W P-5'-P-CCNC: 14 U/L (ref 5–45)
BACTERIA UR QL AUTO: ABNORMAL /HPF
BASOPHILS # BLD AUTO: 0.04 THOUSANDS/ΜL (ref 0–0.1)
BASOPHILS NFR BLD AUTO: 0 % (ref 0–1)
BILIRUB SERPL-MCNC: 0.6 MG/DL (ref 0.2–1)
BILIRUB UR QL STRIP: ABNORMAL
BUN SERPL-MCNC: 8 MG/DL (ref 5–25)
CALCIUM ALBUM COR SERPL-MCNC: 9.9 MG/DL (ref 8.3–10.1)
CALCIUM SERPL-MCNC: 9.3 MG/DL (ref 8.3–10.1)
CAOX CRY URNS QL MICRO: ABNORMAL /HPF
CHLORIDE SERPL-SCNC: 105 MMOL/L (ref 100–108)
CLARITY UR: ABNORMAL
CO2 SERPL-SCNC: 29 MMOL/L (ref 21–32)
COLOR UR: ABNORMAL
CREAT SERPL-MCNC: 0.72 MG/DL (ref 0.6–1.3)
EOSINOPHIL # BLD AUTO: 0.06 THOUSAND/ΜL (ref 0–0.61)
EOSINOPHIL NFR BLD AUTO: 0 % (ref 0–6)
ERYTHROCYTE [DISTWIDTH] IN BLOOD BY AUTOMATED COUNT: 13.2 % (ref 11.6–15.1)
FERRITIN SERPL-MCNC: 67 NG/ML (ref 8–388)
GFR SERPL CREATININE-BSD FRML MDRD: 130 ML/MIN/1.73SQ M
GLUCOSE SERPL-MCNC: 91 MG/DL (ref 65–140)
GLUCOSE UR STRIP-MCNC: NEGATIVE MG/DL
HCT VFR BLD AUTO: 40.7 % (ref 34.8–46.1)
HGB BLD-MCNC: 12.8 G/DL (ref 11.5–15.4)
HGB UR QL STRIP.AUTO: NEGATIVE
IMM GRANULOCYTES # BLD AUTO: 0.05 THOUSAND/UL (ref 0–0.2)
IMM GRANULOCYTES NFR BLD AUTO: 0 % (ref 0–2)
IRON SERPL-MCNC: 18 UG/DL (ref 50–170)
KETONES UR STRIP-MCNC: ABNORMAL MG/DL
LEUKOCYTE ESTERASE UR QL STRIP: ABNORMAL
LYMPHOCYTES # BLD AUTO: 2.14 THOUSANDS/ΜL (ref 0.6–4.47)
LYMPHOCYTES NFR BLD AUTO: 15 % (ref 14–44)
MCH RBC QN AUTO: 28.8 PG (ref 26.8–34.3)
MCHC RBC AUTO-ENTMCNC: 31.4 G/DL (ref 31.4–37.4)
MCV RBC AUTO: 92 FL (ref 82–98)
MONOCYTES # BLD AUTO: 1.03 THOUSAND/ΜL (ref 0.17–1.22)
MONOCYTES NFR BLD AUTO: 7 % (ref 4–12)
MUCOUS THREADS UR QL AUTO: ABNORMAL
NEUTROPHILS # BLD AUTO: 11.09 THOUSANDS/ΜL (ref 1.85–7.62)
NEUTS SEG NFR BLD AUTO: 78 % (ref 43–75)
NITRITE UR QL STRIP: NEGATIVE
NON-SQ EPI CELLS URNS QL MICRO: ABNORMAL /HPF
NRBC BLD AUTO-RTO: 0 /100 WBCS
PH UR STRIP.AUTO: 6 [PH]
PLATELET # BLD AUTO: 426 THOUSANDS/UL (ref 149–390)
PMV BLD AUTO: 9.8 FL (ref 8.9–12.7)
POTASSIUM SERPL-SCNC: 4.2 MMOL/L (ref 3.5–5.3)
PROT SERPL-MCNC: 9 G/DL (ref 6.4–8.2)
PROT UR STRIP-MCNC: ABNORMAL MG/DL
RBC # BLD AUTO: 4.45 MILLION/UL (ref 3.81–5.12)
RBC #/AREA URNS AUTO: ABNORMAL /HPF
SODIUM SERPL-SCNC: 138 MMOL/L (ref 136–145)
SP GR UR STRIP.AUTO: 1.03 (ref 1–1.03)
UROBILINOGEN UR QL STRIP.AUTO: 1 E.U./DL
WBC # BLD AUTO: 14.41 THOUSAND/UL (ref 4.31–10.16)
WBC #/AREA URNS AUTO: ABNORMAL /HPF

## 2020-11-05 PROCEDURE — 71046 X-RAY EXAM CHEST 2 VIEWS: CPT

## 2020-11-05 PROCEDURE — 83540 ASSAY OF IRON: CPT

## 2020-11-05 PROCEDURE — 87086 URINE CULTURE/COLONY COUNT: CPT

## 2020-11-05 PROCEDURE — 84630 ASSAY OF ZINC: CPT

## 2020-11-05 PROCEDURE — U0003 INFECTIOUS AGENT DETECTION BY NUCLEIC ACID (DNA OR RNA); SEVERE ACUTE RESPIRATORY SYNDROME CORONAVIRUS 2 (SARS-COV-2) (CORONAVIRUS DISEASE [COVID-19]), AMPLIFIED PROBE TECHNIQUE, MAKING USE OF HIGH THROUGHPUT TECHNOLOGIES AS DESCRIBED BY CMS-2020-01-R: HCPCS | Performed by: PHYSICIAN ASSISTANT

## 2020-11-05 PROCEDURE — 80053 COMPREHEN METABOLIC PANEL: CPT

## 2020-11-05 PROCEDURE — 85025 COMPLETE CBC W/AUTO DIFF WBC: CPT

## 2020-11-05 PROCEDURE — 36415 COLL VENOUS BLD VENIPUNCTURE: CPT

## 2020-11-05 PROCEDURE — 81001 URINALYSIS AUTO W/SCOPE: CPT

## 2020-11-05 PROCEDURE — 99203 OFFICE O/P NEW LOW 30 MIN: CPT | Performed by: PHYSICIAN ASSISTANT

## 2020-11-05 PROCEDURE — 82728 ASSAY OF FERRITIN: CPT

## 2020-11-05 PROCEDURE — 87631 RESP VIRUS 3-5 TARGETS: CPT | Performed by: PHYSICIAN ASSISTANT

## 2020-11-05 RX ORDER — POLYETHYLENE GLYCOL 3350 17 G/17G
POWDER, FOR SOLUTION ORAL
COMMUNITY
Start: 2020-10-08 | End: 2021-12-26

## 2020-11-05 RX ORDER — METRONIDAZOLE 250 MG/1
500 TABLET ORAL 2 TIMES DAILY
COMMUNITY
End: 2021-12-26

## 2020-11-05 RX ORDER — PANTOPRAZOLE SODIUM 20 MG/1
40 TABLET, DELAYED RELEASE ORAL
COMMUNITY
End: 2021-12-26

## 2020-11-05 RX ORDER — AMOXICILLIN AND CLAVULANATE POTASSIUM 875; 125 MG/1; MG/1
1 TABLET, FILM COATED ORAL EVERY 12 HOURS SCHEDULED
Qty: 20 TABLET | Refills: 0 | Status: SHIPPED | OUTPATIENT
Start: 2020-11-05 | End: 2020-11-15

## 2020-11-05 RX ORDER — BACLOFEN 20 MG/1
TABLET ORAL
COMMUNITY
Start: 2020-11-04 | End: 2021-12-26

## 2020-11-06 LAB
BACTERIA UR CULT: NORMAL
FLUAV RNA NPH QL NAA+PROBE: NORMAL
FLUBV RNA NPH QL NAA+PROBE: NORMAL
RSV RNA NPH QL NAA+PROBE: NORMAL
SARS-COV-2 RNA SPEC QL NAA+PROBE: NOT DETECTED

## 2020-11-06 RX ORDER — FERROUS SULFATE TAB EC 324 MG (65 MG FE EQUIVALENT) 324 (65 FE) MG
324 TABLET DELAYED RESPONSE ORAL EVERY OTHER DAY
Qty: 30 TABLET | Refills: 5 | Status: SHIPPED | OUTPATIENT
Start: 2020-11-06 | End: 2021-12-26

## 2020-11-06 RX ORDER — ASCORBIC ACID 500 MG
500 TABLET ORAL EVERY OTHER DAY
Qty: 30 TABLET | Refills: 3 | Status: SHIPPED | OUTPATIENT
Start: 2020-11-06 | End: 2021-12-26

## 2020-11-06 RX ORDER — SULFAMETHOXAZOLE AND TRIMETHOPRIM 800; 160 MG/1; MG/1
1 TABLET ORAL EVERY 12 HOURS SCHEDULED
Qty: 14 TABLET | Refills: 0 | Status: SHIPPED | OUTPATIENT
Start: 2020-11-06 | End: 2020-11-13

## 2020-11-09 LAB — ZINC SERPL-MCNC: 66 UG/DL (ref 56–134)

## 2020-11-11 ENCOUNTER — TELEPHONE (OUTPATIENT)
Dept: FAMILY MEDICINE CLINIC | Facility: CLINIC | Age: 30
End: 2020-11-11

## 2020-11-27 ENCOUNTER — HOSPITAL ENCOUNTER (OUTPATIENT)
Dept: RADIOLOGY | Facility: HOSPITAL | Age: 30
Discharge: HOME | End: 2020-11-27
Payer: COMMERCIAL

## 2020-11-27 ENCOUNTER — TRANSCRIBE ORDERS (OUTPATIENT)
Dept: RADIOLOGY | Facility: HOSPITAL | Age: 30
End: 2020-11-27

## 2020-11-27 DIAGNOSIS — M79.642 LEFT HAND PAIN: Primary | ICD-10-CM

## 2020-11-27 DIAGNOSIS — M79.642 LEFT HAND PAIN: ICD-10-CM

## 2020-11-27 PROCEDURE — 73130 X-RAY EXAM OF HAND: CPT

## 2020-12-04 ENCOUNTER — TELEPHONE (OUTPATIENT)
Dept: FAMILY MEDICINE CLINIC | Facility: CLINIC | Age: 30
End: 2020-12-04

## 2020-12-10 RX ORDER — MELOXICAM 7.5 MG/1
TABLET ORAL
COMMUNITY
Start: 2020-11-20 | End: 2021-07-06

## 2020-12-11 ENCOUNTER — OFFICE VISIT (OUTPATIENT)
Dept: FAMILY MEDICINE CLINIC | Facility: CLINIC | Age: 30
End: 2020-12-11
Payer: COMMERCIAL

## 2020-12-11 VITALS
BODY MASS INDEX: 23.37 KG/M2 | OXYGEN SATURATION: 100 % | WEIGHT: 127 LBS | RESPIRATION RATE: 18 BRPM | TEMPERATURE: 98.2 F | DIASTOLIC BLOOD PRESSURE: 82 MMHG | HEIGHT: 62 IN | HEART RATE: 76 BPM | SYSTOLIC BLOOD PRESSURE: 122 MMHG

## 2020-12-11 DIAGNOSIS — Z23 IMMUNIZATION DUE: Primary | ICD-10-CM

## 2020-12-11 DIAGNOSIS — N92.0 MENORRHAGIA WITH REGULAR CYCLE: ICD-10-CM

## 2020-12-11 DIAGNOSIS — Z00.00 ANNUAL PHYSICAL EXAM: ICD-10-CM

## 2020-12-11 PROBLEM — Z83.2 FAMILY HISTORY OF HYPERCOAGULABLE STATE: Status: ACTIVE | Noted: 2020-10-03

## 2020-12-11 PROBLEM — Z86.73 HISTORY OF ISCHEMIC RIGHT MCA STROKE: Status: ACTIVE | Noted: 2020-08-05

## 2020-12-11 PROCEDURE — 99395 PREV VISIT EST AGE 18-39: CPT | Performed by: NURSE PRACTITIONER

## 2020-12-11 PROCEDURE — 90471 IMMUNIZATION ADMIN: CPT | Performed by: NURSE PRACTITIONER

## 2020-12-11 PROCEDURE — 90686 IIV4 VACC NO PRSV 0.5 ML IM: CPT | Performed by: NURSE PRACTITIONER

## 2020-12-11 RX ORDER — ONABOTULINUMTOXINA 100 [USP'U]/1
INJECTION, POWDER, LYOPHILIZED, FOR SOLUTION INTRADERMAL; INTRAMUSCULAR
COMMUNITY
Start: 2020-12-02 | End: 2021-12-26

## 2020-12-15 ENCOUNTER — TELEMEDICINE (OUTPATIENT)
Dept: FAMILY MEDICINE CLINIC | Facility: CLINIC | Age: 30
End: 2020-12-15
Payer: COMMERCIAL

## 2020-12-15 DIAGNOSIS — Z20.822 EXPOSURE TO COVID-19 VIRUS: Primary | ICD-10-CM

## 2020-12-15 PROCEDURE — 99213 OFFICE O/P EST LOW 20 MIN: CPT | Performed by: NURSE PRACTITIONER

## 2020-12-18 DIAGNOSIS — Z20.822 EXPOSURE TO COVID-19 VIRUS: ICD-10-CM

## 2020-12-18 PROCEDURE — U0003 INFECTIOUS AGENT DETECTION BY NUCLEIC ACID (DNA OR RNA); SEVERE ACUTE RESPIRATORY SYNDROME CORONAVIRUS 2 (SARS-COV-2) (CORONAVIRUS DISEASE [COVID-19]), AMPLIFIED PROBE TECHNIQUE, MAKING USE OF HIGH THROUGHPUT TECHNOLOGIES AS DESCRIBED BY CMS-2020-01-R: HCPCS | Performed by: NURSE PRACTITIONER

## 2020-12-19 LAB — SARS-COV-2 RNA SPEC QL NAA+PROBE: DETECTED

## 2020-12-21 ENCOUNTER — TELEMEDICINE (OUTPATIENT)
Dept: FAMILY MEDICINE CLINIC | Facility: CLINIC | Age: 30
End: 2020-12-21
Payer: COMMERCIAL

## 2020-12-21 DIAGNOSIS — U07.1 COVID-19 VIRUS RNA DETECTED: Primary | ICD-10-CM

## 2020-12-21 PROCEDURE — 99212 OFFICE O/P EST SF 10 MIN: CPT | Performed by: NURSE PRACTITIONER

## 2020-12-23 DIAGNOSIS — K21.9 GASTROESOPHAGEAL REFLUX DISEASE WITHOUT ESOPHAGITIS: ICD-10-CM

## 2020-12-23 RX ORDER — FAMOTIDINE 20 MG/1
20 TABLET, FILM COATED ORAL 2 TIMES DAILY
Qty: 60 TABLET | Refills: 5 | Status: SHIPPED | OUTPATIENT
Start: 2020-12-23 | End: 2021-01-08 | Stop reason: SDUPTHER

## 2020-12-26 ENCOUNTER — TELEMEDICINE (OUTPATIENT)
Dept: FAMILY MEDICINE CLINIC | Facility: CLINIC | Age: 30
End: 2020-12-26
Payer: COMMERCIAL

## 2020-12-26 DIAGNOSIS — U07.1 COVID-19 VIRUS RNA DETECTED: Primary | ICD-10-CM

## 2020-12-26 PROCEDURE — 1036F TOBACCO NON-USER: CPT | Performed by: NURSE PRACTITIONER

## 2020-12-26 PROCEDURE — 99212 OFFICE O/P EST SF 10 MIN: CPT | Performed by: NURSE PRACTITIONER

## 2020-12-30 ENCOUNTER — OFFICE VISIT (OUTPATIENT)
Dept: OBGYN CLINIC | Facility: CLINIC | Age: 30
End: 2020-12-30
Payer: COMMERCIAL

## 2020-12-30 VITALS
DIASTOLIC BLOOD PRESSURE: 82 MMHG | SYSTOLIC BLOOD PRESSURE: 120 MMHG | HEIGHT: 62 IN | BODY MASS INDEX: 25.06 KG/M2 | WEIGHT: 136.2 LBS

## 2020-12-30 DIAGNOSIS — N92.0 MENORRHAGIA WITH REGULAR CYCLE: Primary | ICD-10-CM

## 2020-12-30 DIAGNOSIS — D68.9 CLOTTING DISORDER (HCC): ICD-10-CM

## 2020-12-30 PROCEDURE — 3008F BODY MASS INDEX DOCD: CPT | Performed by: NURSE PRACTITIONER

## 2020-12-30 PROCEDURE — 99203 OFFICE O/P NEW LOW 30 MIN: CPT | Performed by: OBSTETRICS & GYNECOLOGY

## 2021-01-08 DIAGNOSIS — K21.9 GASTROESOPHAGEAL REFLUX DISEASE WITHOUT ESOPHAGITIS: ICD-10-CM

## 2021-01-11 ENCOUNTER — TELEPHONE (OUTPATIENT)
Dept: FAMILY MEDICINE CLINIC | Facility: CLINIC | Age: 31
End: 2021-01-11

## 2021-01-11 RX ORDER — FAMOTIDINE 20 MG/1
20 TABLET, FILM COATED ORAL 2 TIMES DAILY
Qty: 60 TABLET | Refills: 0 | Status: SHIPPED | OUTPATIENT
Start: 2021-01-11 | End: 2021-02-11 | Stop reason: SDUPTHER

## 2021-01-19 ENCOUNTER — TELEPHONE (OUTPATIENT)
Dept: FAMILY MEDICINE CLINIC | Facility: CLINIC | Age: 31
End: 2021-01-19

## 2021-01-20 ENCOUNTER — TELEPHONE (OUTPATIENT)
Dept: FAMILY MEDICINE CLINIC | Facility: CLINIC | Age: 31
End: 2021-01-20

## 2021-02-04 DIAGNOSIS — I63.511 ACUTE ISCHEMIC RIGHT MCA STROKE (HCC): ICD-10-CM

## 2021-02-04 DIAGNOSIS — F32.A DEPRESSION, UNSPECIFIED DEPRESSION TYPE: ICD-10-CM

## 2021-02-04 DIAGNOSIS — G81.14 SPASTIC HEMIPLEGIA AFFECTING LEFT NONDOMINANT SIDE, UNSPECIFIED ETIOLOGY (HCC): ICD-10-CM

## 2021-02-04 RX ORDER — FLUOXETINE HYDROCHLORIDE 20 MG/1
20 CAPSULE ORAL DAILY
Qty: 30 CAPSULE | Refills: 5 | Status: SHIPPED | OUTPATIENT
Start: 2021-02-04 | End: 2021-03-01 | Stop reason: SDUPTHER

## 2021-02-04 RX ORDER — ATORVASTATIN CALCIUM 80 MG/1
80 TABLET, FILM COATED ORAL DAILY
Qty: 30 TABLET | Refills: 5 | Status: SHIPPED | OUTPATIENT
Start: 2021-02-04 | End: 2021-03-08 | Stop reason: SDUPTHER

## 2021-02-04 RX ORDER — AMLODIPINE BESYLATE 5 MG/1
5 TABLET ORAL DAILY
Qty: 30 TABLET | Refills: 5 | Status: SHIPPED | OUTPATIENT
Start: 2021-02-04 | End: 2021-03-06 | Stop reason: SDUPTHER

## 2021-02-11 DIAGNOSIS — K21.9 GASTROESOPHAGEAL REFLUX DISEASE WITHOUT ESOPHAGITIS: ICD-10-CM

## 2021-02-11 RX ORDER — FAMOTIDINE 20 MG/1
20 TABLET, FILM COATED ORAL 2 TIMES DAILY
Qty: 60 TABLET | Refills: 1 | Status: SHIPPED | OUTPATIENT
Start: 2021-02-11 | End: 2021-02-22 | Stop reason: SDUPTHER

## 2021-02-22 DIAGNOSIS — K21.9 GASTROESOPHAGEAL REFLUX DISEASE WITHOUT ESOPHAGITIS: ICD-10-CM

## 2021-02-23 RX ORDER — FAMOTIDINE 20 MG/1
20 TABLET, FILM COATED ORAL 2 TIMES DAILY
Qty: 60 TABLET | Refills: 5 | Status: SHIPPED | OUTPATIENT
Start: 2021-02-23 | End: 2021-04-20 | Stop reason: SDUPTHER

## 2021-02-28 DIAGNOSIS — G81.14 SPASTIC HEMIPLEGIA AFFECTING LEFT NONDOMINANT SIDE, UNSPECIFIED ETIOLOGY (HCC): ICD-10-CM

## 2021-02-28 DIAGNOSIS — F32.A DEPRESSION, UNSPECIFIED DEPRESSION TYPE: ICD-10-CM

## 2021-02-28 RX ORDER — FLUOXETINE HYDROCHLORIDE 20 MG/1
20 CAPSULE ORAL DAILY
Qty: 30 CAPSULE | Refills: 0 | Status: CANCELLED | OUTPATIENT
Start: 2021-02-28

## 2021-03-01 DIAGNOSIS — G81.14 SPASTIC HEMIPLEGIA AFFECTING LEFT NONDOMINANT SIDE, UNSPECIFIED ETIOLOGY (HCC): ICD-10-CM

## 2021-03-01 DIAGNOSIS — F32.A DEPRESSION, UNSPECIFIED DEPRESSION TYPE: ICD-10-CM

## 2021-03-01 RX ORDER — FLUOXETINE HYDROCHLORIDE 20 MG/1
20 CAPSULE ORAL DAILY
Qty: 30 CAPSULE | Refills: 5 | Status: SHIPPED | OUTPATIENT
Start: 2021-03-01 | End: 2021-03-25 | Stop reason: SDUPTHER

## 2021-03-06 DIAGNOSIS — G81.14 SPASTIC HEMIPLEGIA AFFECTING LEFT NONDOMINANT SIDE, UNSPECIFIED ETIOLOGY (HCC): ICD-10-CM

## 2021-03-06 DIAGNOSIS — I63.511 ACUTE ISCHEMIC RIGHT MCA STROKE (HCC): ICD-10-CM

## 2021-03-06 RX ORDER — ATORVASTATIN CALCIUM 80 MG/1
80 TABLET, FILM COATED ORAL DAILY
Qty: 30 TABLET | Refills: 0 | Status: CANCELLED | OUTPATIENT
Start: 2021-03-06 | End: 2022-03-06

## 2021-03-08 DIAGNOSIS — G81.14 SPASTIC HEMIPLEGIA AFFECTING LEFT NONDOMINANT SIDE, UNSPECIFIED ETIOLOGY (HCC): ICD-10-CM

## 2021-03-08 RX ORDER — ATORVASTATIN CALCIUM 80 MG/1
80 TABLET, FILM COATED ORAL DAILY
Qty: 30 TABLET | Refills: 5 | Status: SHIPPED | OUTPATIENT
Start: 2021-03-08 | End: 2021-04-05 | Stop reason: SDUPTHER

## 2021-03-08 RX ORDER — AMLODIPINE BESYLATE 5 MG/1
5 TABLET ORAL DAILY
Qty: 30 TABLET | Refills: 5 | Status: SHIPPED | OUTPATIENT
Start: 2021-03-08 | End: 2021-05-25 | Stop reason: SDUPTHER

## 2021-03-10 DIAGNOSIS — Z23 ENCOUNTER FOR IMMUNIZATION: ICD-10-CM

## 2021-03-25 DIAGNOSIS — G81.14 SPASTIC HEMIPLEGIA AFFECTING LEFT NONDOMINANT SIDE, UNSPECIFIED ETIOLOGY (HCC): ICD-10-CM

## 2021-03-25 DIAGNOSIS — F32.A DEPRESSION, UNSPECIFIED DEPRESSION TYPE: ICD-10-CM

## 2021-03-25 RX ORDER — FLUOXETINE HYDROCHLORIDE 20 MG/1
20 CAPSULE ORAL DAILY
Qty: 30 CAPSULE | Refills: 0 | Status: SHIPPED | OUTPATIENT
Start: 2021-03-25 | End: 2021-05-25 | Stop reason: SDUPTHER

## 2021-04-04 DIAGNOSIS — G81.14 SPASTIC HEMIPLEGIA AFFECTING LEFT NONDOMINANT SIDE, UNSPECIFIED ETIOLOGY (HCC): ICD-10-CM

## 2021-04-04 RX ORDER — ATORVASTATIN CALCIUM 80 MG/1
80 TABLET, FILM COATED ORAL DAILY
Qty: 30 TABLET | Refills: 0 | Status: CANCELLED | OUTPATIENT
Start: 2021-04-04 | End: 2022-04-04

## 2021-04-05 DIAGNOSIS — G81.14 SPASTIC HEMIPLEGIA AFFECTING LEFT NONDOMINANT SIDE, UNSPECIFIED ETIOLOGY (HCC): ICD-10-CM

## 2021-04-05 RX ORDER — ATORVASTATIN CALCIUM 80 MG/1
80 TABLET, FILM COATED ORAL DAILY
Qty: 30 TABLET | Refills: 5 | Status: SHIPPED | OUTPATIENT
Start: 2021-04-05 | End: 2021-05-25 | Stop reason: SDUPTHER

## 2021-04-06 DIAGNOSIS — D68.51 HETEROZYGOUS FACTOR V LEIDEN MUTATION (HCC): ICD-10-CM

## 2021-04-06 RX ORDER — APIXABAN 5 MG/1
TABLET, FILM COATED ORAL
Qty: 60 TABLET | Refills: 5 | Status: SHIPPED | OUTPATIENT
Start: 2021-04-06 | End: 2021-05-25 | Stop reason: SDUPTHER

## 2021-04-20 DIAGNOSIS — K21.9 GASTROESOPHAGEAL REFLUX DISEASE WITHOUT ESOPHAGITIS: ICD-10-CM

## 2021-04-20 RX ORDER — FAMOTIDINE 20 MG/1
20 TABLET, FILM COATED ORAL 2 TIMES DAILY
Qty: 60 TABLET | Refills: 5 | Status: SHIPPED | OUTPATIENT
Start: 2021-04-20 | End: 2021-05-25 | Stop reason: SDUPTHER

## 2021-05-25 DIAGNOSIS — F32.A DEPRESSION, UNSPECIFIED DEPRESSION TYPE: ICD-10-CM

## 2021-05-25 DIAGNOSIS — I63.511 ACUTE ISCHEMIC RIGHT MCA STROKE (HCC): ICD-10-CM

## 2021-05-25 DIAGNOSIS — G47.00 INSOMNIA, UNSPECIFIED TYPE: ICD-10-CM

## 2021-05-25 DIAGNOSIS — D68.51 HETEROZYGOUS FACTOR V LEIDEN MUTATION (HCC): ICD-10-CM

## 2021-05-25 DIAGNOSIS — K21.9 GASTROESOPHAGEAL REFLUX DISEASE WITHOUT ESOPHAGITIS: ICD-10-CM

## 2021-05-25 DIAGNOSIS — G81.14 SPASTIC HEMIPLEGIA AFFECTING LEFT NONDOMINANT SIDE, UNSPECIFIED ETIOLOGY (HCC): ICD-10-CM

## 2021-05-26 RX ORDER — FAMOTIDINE 20 MG/1
20 TABLET, FILM COATED ORAL 2 TIMES DAILY
Qty: 180 TABLET | Refills: 3 | Status: SHIPPED | OUTPATIENT
Start: 2021-05-26 | End: 2021-06-01 | Stop reason: SDUPTHER

## 2021-05-26 RX ORDER — ATORVASTATIN CALCIUM 80 MG/1
80 TABLET, FILM COATED ORAL DAILY
Qty: 90 TABLET | Refills: 3 | Status: SHIPPED | OUTPATIENT
Start: 2021-05-26 | End: 2021-06-01 | Stop reason: SDUPTHER

## 2021-05-26 RX ORDER — AMLODIPINE BESYLATE 5 MG/1
5 TABLET ORAL DAILY
Qty: 90 TABLET | Refills: 3 | Status: SHIPPED | OUTPATIENT
Start: 2021-05-26 | End: 2021-06-01 | Stop reason: SDUPTHER

## 2021-05-26 RX ORDER — LANOLIN ALCOHOL/MO/W.PET/CERES
3 CREAM (GRAM) TOPICAL
Qty: 90 TABLET | Refills: 3
Start: 2021-05-26 | End: 2021-06-01 | Stop reason: SDUPTHER

## 2021-05-26 RX ORDER — FLUOXETINE HYDROCHLORIDE 20 MG/1
20 CAPSULE ORAL DAILY
Qty: 90 CAPSULE | Refills: 3 | Status: SHIPPED | OUTPATIENT
Start: 2021-05-26 | End: 2021-06-01 | Stop reason: SDUPTHER

## 2021-05-31 ENCOUNTER — HOSPITAL ENCOUNTER (EMERGENCY)
Facility: HOSPITAL | Age: 31
Discharge: HOME | End: 2021-05-31
Attending: EMERGENCY MEDICINE | Admitting: EMERGENCY MEDICINE
Payer: COMMERCIAL

## 2021-05-31 VITALS
BODY MASS INDEX: 26.89 KG/M2 | HEART RATE: 114 BPM | SYSTOLIC BLOOD PRESSURE: 148 MMHG | DIASTOLIC BLOOD PRESSURE: 96 MMHG | RESPIRATION RATE: 18 BRPM | TEMPERATURE: 96.2 F | OXYGEN SATURATION: 98 % | WEIGHT: 147 LBS

## 2021-05-31 DIAGNOSIS — L03.213 PERIORBITAL CELLULITIS OF LEFT EYE: ICD-10-CM

## 2021-05-31 DIAGNOSIS — K04.7 DENTAL INFECTION: Primary | ICD-10-CM

## 2021-05-31 PROCEDURE — 99283 EMERGENCY DEPT VISIT LOW MDM: CPT

## 2021-05-31 PROCEDURE — 99284 EMERGENCY DEPT VISIT MOD MDM: CPT | Performed by: EMERGENCY MEDICINE

## 2021-05-31 RX ORDER — ACETAMINOPHEN AND CODEINE PHOSPHATE 300; 30 MG/1; MG/1
1-2 TABLET ORAL EVERY 6 HOURS PRN
Qty: 10 TABLET | Refills: 0 | Status: SHIPPED | OUTPATIENT
Start: 2021-05-31 | End: 2021-06-10

## 2021-05-31 RX ORDER — AMOXICILLIN AND CLAVULANATE POTASSIUM 875; 125 MG/1; MG/1
1 TABLET, FILM COATED ORAL EVERY 12 HOURS
Qty: 14 TABLET | Refills: 0 | Status: SHIPPED | OUTPATIENT
Start: 2021-05-31 | End: 2021-06-07

## 2021-05-31 RX ORDER — PREDNISONE 20 MG/1
60 TABLET ORAL ONCE
Status: COMPLETED | OUTPATIENT
Start: 2021-05-31 | End: 2021-05-31

## 2021-05-31 RX ORDER — OXYCODONE HYDROCHLORIDE AND ACETAMINOPHEN 5; 325 MG/1; MG/1
1 TABLET ORAL ONCE
Status: COMPLETED | OUTPATIENT
Start: 2021-05-31 | End: 2021-05-31

## 2021-05-31 RX ORDER — AMOXICILLIN AND CLAVULANATE POTASSIUM 875; 125 MG/1; MG/1
1 TABLET, FILM COATED ORAL ONCE
Status: COMPLETED | OUTPATIENT
Start: 2021-05-31 | End: 2021-05-31

## 2021-05-31 RX ORDER — PREDNISONE 20 MG/1
20 TABLET ORAL 2 TIMES DAILY WITH MEALS
Qty: 6 TABLET | Refills: 0 | Status: SHIPPED | OUTPATIENT
Start: 2021-05-31 | End: 2021-06-03

## 2021-05-31 RX ADMIN — AMOXICILLIN AND CLAVULANATE POTASSIUM 1 TABLET: 875; 125 TABLET, FILM COATED ORAL at 04:26

## 2021-05-31 RX ADMIN — PREDNISONE 60 MG: 20 TABLET ORAL at 04:26

## 2021-05-31 RX ADMIN — OXYCODONE HYDROCHLORIDE AND ACETAMINOPHEN 1 TABLET: 5; 325 TABLET ORAL at 04:26

## 2021-05-31 NOTE — ED PROVIDER NOTES
History  Chief Complaint   Patient presents with    Facial Swelling     Pt reports L sided facial swelling over the past 2-3 days  She states getting L sided upper cavity filled just a few days prior to the pain and swelling  33 y/o AA female c/o L upper jaw pain along with associated facial swelling which is infraorbital - for past 3 days, worsening in severity  She states that she had a dental procedure performed recently - approximately one (1) week ago in same area  She denies taking anything for pain PTA  She is s/p R MCA CVA in 8/20  She is pleasant, cooperative, and nontoxic in appearance  She is experiencing increasing pain with biting down/chewing, but denies any dysphagia  Prior to Admission Medications   Prescriptions Last Dose Informant Patient Reported? Taking?    Botox 100 units   Yes No   FLUoxetine (PROzac) 20 mg capsule   No No   Sig: Take 1 capsule (20 mg total) by mouth daily   MiraLax 17 GM/SCOOP powder   Yes No   Sig: TAKE 17 GRAMS BY MOUTH ONCE DAILY FOR 7 DAYS   amLODIPine (NORVASC) 5 mg tablet   No No   Sig: Take 1 tablet (5 mg total) by mouth daily   apixaban (Eliquis) 5 mg   No No   Sig: Take 1 tablet (5 mg total) by mouth 2 (two) times a day   ascorbic acid (VITAMIN C) 500 mg tablet   No No   Sig: Take 1 tablet (500 mg total) by mouth every other day   Patient not taking: Reported on 12/11/2020   atorvastatin (LIPITOR) 80 mg tablet   No No   Sig: Take 1 tablet (80 mg total) by mouth daily   baclofen 10 mg tablet   No No   Sig: Take 1 tablet (10 mg total) by mouth 4 (four) times a day   Patient not taking: Reported on 12/21/2020   baclofen 20 mg tablet   Yes No   docusate sodium (COLACE) 100 mg capsule   No No   Sig: Take 1 capsule (100 mg total) by mouth 2 (two) times a day   Patient not taking: Reported on 12/11/2020   famotidine (PEPCID) 20 mg tablet   No No   Sig: Take 1 tablet (20 mg total) by mouth 2 (two) times a day   ferrous sulfate 324 (65 Fe) mg   No No Sig: Take 1 tablet (324 mg total) by mouth every other day   levonorgestrel (MIRENA) 20 MCG/24HR IUD   No No   Si Intra Uterine Device by Intrauterine route once for 1 dose   loratadine (CLARITIN) 10 mg tablet   No No   Sig: Take 1 tablet (10 mg total) by mouth daily   melatonin 3 mg   No No   Sig: Take 1 tablet (3 mg total) by mouth daily at bedtime as needed (sleep)   meloxicam (MOBIC) 7 5 mg tablet   Yes No   Sig: TK 1 T PO D FOR 7 DAYS   metroNIDAZOLE (Flagyl) 250 mg tablet   Yes No   Sig: Take 500 mg by mouth 2 (two) times a day   pantoprazole (PROTONIX) 40 mg tablet   No No   Sig: Take 1 tablet (40 mg total) by mouth daily for 14 days   pantoprazole (Protonix) 20 mg tablet   Yes No   Sig: Take 40 mg by mouth   polyethylene glycol (MIRALAX) 17 g packet   No No   Sig: Take 17 g by mouth daily for 7 days   sucralfate (CARAFATE) 1 g/10 mL suspension   No No   Sig: Take 10 mL (1 g total) by mouth 4 (four) times a day for 10 days      Facility-Administered Medications: None       Past Medical History:   Diagnosis Date    Bronchitis     Embolus ([address] )     Hypertension     Stroke (cerebrum) (HCC)        Past Surgical History:   Procedure Laterality Date    TONSILLECTOMY  2013       Family History   Problem Relation Age of Onset    Hypertension Mother     Diabetes Maternal Grandfather      I have reviewed and agree with the history as documented  E-Cigarette/Vaping    E-Cigarette Use Never User      E-Cigarette/Vaping Substances    Nicotine No     THC No     CBD No     Flavoring No     Other No     Unknown No      Social History     Tobacco Use    Smoking status: Former Smoker    Smokeless tobacco: Never Used    Tobacco comment: quit 3 months ago   Substance Use Topics    Alcohol use: Not Currently     Comment: rarely    Drug use: No       Review of Systems   HENT: Positive for dental problem, facial swelling and sinus pain  All other systems reviewed and are negative        Physical Exam  Physical Exam  Vitals signs and nursing note reviewed  Constitutional:       Appearance: Normal appearance  She is normal weight  She is not ill-appearing  HENT:      Head: Normocephalic and atraumatic  Left periorbital erythema present  Jaw: There is normal jaw occlusion  Comments: L periorbital swelling  Right Ear: Tympanic membrane and ear canal normal       Left Ear: Tympanic membrane and ear canal normal       Nose: Nose normal       Mouth/Throat:      Mouth: Mucous membranes are moist    Eyes:      Extraocular Movements: Extraocular movements intact  Conjunctiva/sclera: Conjunctivae normal       Pupils: Pupils are equal, round, and reactive to light  Neck:      Musculoskeletal: Normal range of motion and neck supple  Cardiovascular:      Rate and Rhythm: Normal rate and regular rhythm  Pulses: Normal pulses  Heart sounds: Normal heart sounds  Pulmonary:      Effort: Pulmonary effort is normal       Breath sounds: Normal breath sounds  Abdominal:      General: Bowel sounds are normal       Palpations: Abdomen is soft  Musculoskeletal:      Right lower leg: No edema  Left lower leg: No edema  Skin:     General: Skin is warm and dry  Capillary Refill: Capillary refill takes less than 2 seconds  Neurological:      General: No focal deficit present  Mental Status: She is alert and oriented to person, place, and time  Psychiatric:         Mood and Affect: Mood normal          Behavior: Behavior normal          Thought Content:  Thought content normal          Judgment: Judgment normal          Vital Signs  ED Triage Vitals [05/31/21 0411]   Temperature Pulse Respirations Blood Pressure SpO2   (!) 96 2 °F (35 7 °C) (!) 114 18 148/96 98 %      Temp Source Heart Rate Source Patient Position - Orthostatic VS BP Location FiO2 (%)   Tympanic Monitor Sitting Left arm --      Pain Score       Worst Possible Pain           Vitals:    05/31/21 0411   BP: 148/96   Pulse: (!) 114   Patient Position - Orthostatic VS: Sitting         Visual Acuity      ED Medications  Medications   amoxicillin-clavulanate (AUGMENTIN) 875-125 mg per tablet 1 tablet (1 tablet Oral Given 5/31/21 0426)   predniSONE tablet 60 mg (60 mg Oral Given 5/31/21 0426)   oxyCODONE-acetaminophen (PERCOCET) 5-325 mg per tablet 1 tablet (1 tablet Oral Given 5/31/21 0426)       Diagnostic Studies  Results Reviewed     None                 No orders to display              Procedures  Procedures         ED Course                             SBIRT 20yo+      Most Recent Value   SBIRT (24 yo +)   In order to provide better care to our patients, we are screening all of our patients for alcohol and drug use  Would it be okay to ask you these screening questions? Yes Filed at: 05/31/2021 0416   Initial Alcohol Screen: US AUDIT-C    1  How often do you have a drink containing alcohol?  0 Filed at: 05/31/2021 0416   2  How many drinks containing alcohol do you have on a typical day you are drinking? 0 Filed at: 05/31/2021 0416   3b  FEMALE Any Age, or MALE 65+: How often do you have 4 or more drinks on one occassion? 0 Filed at: 05/31/2021 0416   Audit-C Score  0 Filed at: 05/31/2021 6215   JENN: How many times in the past year have you    Used an illegal drug or used a prescription medication for non-medical reasons?   Never Filed at: 05/31/2021 0416                    MDM    Disposition  Final diagnoses:   Dental infection   Periorbital cellulitis of left eye     Time reflects when diagnosis was documented in both MDM as applicable and the Disposition within this note     Time User Action Codes Description Comment    5/31/2021  4:27 AM Lonie Collar Add [K08 89] Dentalgia     5/31/2021  4:27 AM Lonie Collar Add [K04 7] Dental infection     5/31/2021  4:27 AM Lonie Collar Add [R77 305] Periorbital cellulitis of left eye     5/31/2021  4:27 AM Lonie Collar Modify [K04 7] Dental infection 5/31/2021  4:27 AM Mariajose Mart [id number] [address]       ED Disposition     ED Disposition Condition Date/Time Comment    Discharge Stable Mon May 31, 2021  4:27 AM Shanelle Robledo discharge to home/self care  Follow-up Information     Follow up With Specialties Details Why [address] Nurse Practitioner Schedule an appointment as soon as possible for a visit in 1 week  [address] [address]  [phone]            Patient's Medications   Discharge Prescriptions    ACETAMINOPHEN-CODEINE (TYLENOL #3) 300-30 MG PER TABLET    Take 1-2 tablets by mouth every 6 (six) hours as needed for moderate pain for up to 10 days       Start Date: 5/31/2021 End Date: 6/10/2021       Order Dose: 1-2 tablets       Quantity: 10 tablet    Refills: 0    AMOXICILLIN-CLAVULANATE (AUGMENTIN) 875-125 MG PER TABLET    Take 1 tablet by mouth every 12 (twelve) hours for 7 days       Start Date: 5/31/2021 End Date: 6/7/2021       Order Dose: 1 tablet       Quantity: 14 tablet    Refills: 0    PREDNISONE 20 MG TABLET    Take 1 tablet (20 mg total) by mouth 2 (two) times a day with meals for 3 days       Start Date: 5/31/2021 End Date: 6/3/2021       Order Dose: 20 mg       Quantity: 6 tablet    Refills: 0     No discharge procedures on file      PDMP Review     None          ED Provider  Electronically Signed by           Lulu Schaumann, DO  05/31/21 0435

## 2021-06-01 DIAGNOSIS — F32.A DEPRESSION, UNSPECIFIED DEPRESSION TYPE: ICD-10-CM

## 2021-06-01 DIAGNOSIS — D68.51 HETEROZYGOUS FACTOR V LEIDEN MUTATION (HCC): ICD-10-CM

## 2021-06-01 DIAGNOSIS — G47.00 INSOMNIA, UNSPECIFIED TYPE: ICD-10-CM

## 2021-06-01 DIAGNOSIS — G81.14 SPASTIC HEMIPLEGIA AFFECTING LEFT NONDOMINANT SIDE, UNSPECIFIED ETIOLOGY (HCC): ICD-10-CM

## 2021-06-01 DIAGNOSIS — K21.9 GASTROESOPHAGEAL REFLUX DISEASE WITHOUT ESOPHAGITIS: ICD-10-CM

## 2021-06-01 DIAGNOSIS — I63.511 ACUTE ISCHEMIC RIGHT MCA STROKE (HCC): ICD-10-CM

## 2021-06-02 RX ORDER — ATORVASTATIN CALCIUM 80 MG/1
80 TABLET, FILM COATED ORAL DAILY
Qty: 90 TABLET | Refills: 0 | Status: SHIPPED | OUTPATIENT
Start: 2021-06-02 | End: 2022-01-04 | Stop reason: SDUPTHER

## 2021-06-02 RX ORDER — AMLODIPINE BESYLATE 5 MG/1
5 TABLET ORAL DAILY
Qty: 90 TABLET | Refills: 0 | Status: SHIPPED | OUTPATIENT
Start: 2021-06-02 | End: 2021-07-04 | Stop reason: SDUPTHER

## 2021-06-02 RX ORDER — FLUOXETINE HYDROCHLORIDE 20 MG/1
20 CAPSULE ORAL DAILY
Qty: 90 CAPSULE | Refills: 0 | Status: SHIPPED | OUTPATIENT
Start: 2021-06-02 | End: 2021-06-07 | Stop reason: SDUPTHER

## 2021-06-02 RX ORDER — LANOLIN ALCOHOL/MO/W.PET/CERES
3 CREAM (GRAM) TOPICAL
Qty: 90 TABLET | Refills: 0
Start: 2021-06-02 | End: 2021-06-07 | Stop reason: SDUPTHER

## 2021-06-02 RX ORDER — FAMOTIDINE 20 MG/1
20 TABLET, FILM COATED ORAL 2 TIMES DAILY
Qty: 180 TABLET | Refills: 0 | Status: SHIPPED | OUTPATIENT
Start: 2021-06-02 | End: 2021-06-07 | Stop reason: SDUPTHER

## 2021-06-06 DIAGNOSIS — G81.14 SPASTIC HEMIPLEGIA AFFECTING LEFT NONDOMINANT SIDE, UNSPECIFIED ETIOLOGY (HCC): ICD-10-CM

## 2021-06-06 DIAGNOSIS — F32.A DEPRESSION, UNSPECIFIED DEPRESSION TYPE: ICD-10-CM

## 2021-06-06 DIAGNOSIS — K21.9 GASTROESOPHAGEAL REFLUX DISEASE WITHOUT ESOPHAGITIS: ICD-10-CM

## 2021-06-06 DIAGNOSIS — G47.00 INSOMNIA, UNSPECIFIED TYPE: ICD-10-CM

## 2021-06-06 RX ORDER — LANOLIN ALCOHOL/MO/W.PET/CERES
3 CREAM (GRAM) TOPICAL
Qty: 90 TABLET | Refills: 0 | Status: CANCELLED
Start: 2021-06-06

## 2021-06-06 RX ORDER — FAMOTIDINE 20 MG/1
20 TABLET, FILM COATED ORAL 2 TIMES DAILY
Qty: 180 TABLET | Refills: 0 | Status: CANCELLED | OUTPATIENT
Start: 2021-06-06

## 2021-06-06 RX ORDER — FLUOXETINE HYDROCHLORIDE 20 MG/1
20 CAPSULE ORAL DAILY
Qty: 90 CAPSULE | Refills: 0 | Status: CANCELLED | OUTPATIENT
Start: 2021-06-06

## 2021-06-07 DIAGNOSIS — F32.A DEPRESSION, UNSPECIFIED DEPRESSION TYPE: ICD-10-CM

## 2021-06-07 DIAGNOSIS — G47.00 INSOMNIA, UNSPECIFIED TYPE: ICD-10-CM

## 2021-06-07 DIAGNOSIS — G81.14 SPASTIC HEMIPLEGIA AFFECTING LEFT NONDOMINANT SIDE, UNSPECIFIED ETIOLOGY (HCC): ICD-10-CM

## 2021-06-07 DIAGNOSIS — K21.9 GASTROESOPHAGEAL REFLUX DISEASE WITHOUT ESOPHAGITIS: ICD-10-CM

## 2021-06-07 RX ORDER — FLUOXETINE HYDROCHLORIDE 20 MG/1
20 CAPSULE ORAL DAILY
Qty: 90 CAPSULE | Refills: 0 | Status: SHIPPED | OUTPATIENT
Start: 2021-06-07 | End: 2021-09-16 | Stop reason: SDUPTHER

## 2021-06-07 RX ORDER — LANOLIN ALCOHOL/MO/W.PET/CERES
3 CREAM (GRAM) TOPICAL
Qty: 90 TABLET | Refills: 0
Start: 2021-06-07 | End: 2021-07-04 | Stop reason: SDUPTHER

## 2021-06-07 RX ORDER — FAMOTIDINE 20 MG/1
20 TABLET, FILM COATED ORAL 2 TIMES DAILY
Qty: 180 TABLET | Refills: 0 | Status: SHIPPED | OUTPATIENT
Start: 2021-06-07

## 2021-06-09 ENCOUNTER — TELEPHONE (OUTPATIENT)
Dept: FAMILY MEDICINE CLINIC | Facility: CLINIC | Age: 31
End: 2021-06-09

## 2021-06-09 NOTE — TELEPHONE ENCOUNTER
Mother presents at window looking for a letter for her job stating that due to the health conditions of her daughter she is not able to  or work separate shifts

## 2021-06-10 ENCOUNTER — DOCUMENTATION (OUTPATIENT)
Dept: FAMILY MEDICINE CLINIC | Facility: CLINIC | Age: 31
End: 2021-06-10

## 2021-07-04 DIAGNOSIS — D68.9 CLOTTING DISORDER (HCC): ICD-10-CM

## 2021-07-04 DIAGNOSIS — G81.14 SPASTIC HEMIPLEGIA AFFECTING LEFT NONDOMINANT SIDE, UNSPECIFIED ETIOLOGY (HCC): ICD-10-CM

## 2021-07-04 DIAGNOSIS — D68.51 HETEROZYGOUS FACTOR V LEIDEN MUTATION (HCC): ICD-10-CM

## 2021-07-04 DIAGNOSIS — N92.0 MENORRHAGIA WITH REGULAR CYCLE: ICD-10-CM

## 2021-07-04 DIAGNOSIS — G47.00 INSOMNIA, UNSPECIFIED TYPE: ICD-10-CM

## 2021-07-04 DIAGNOSIS — I63.511 ACUTE ISCHEMIC RIGHT MCA STROKE (HCC): ICD-10-CM

## 2021-07-06 RX ORDER — AMLODIPINE BESYLATE 5 MG/1
5 TABLET ORAL DAILY
Qty: 90 TABLET | Refills: 0 | Status: SHIPPED | OUTPATIENT
Start: 2021-07-06 | End: 2021-11-17 | Stop reason: SDUPTHER

## 2021-07-06 RX ORDER — LANOLIN ALCOHOL/MO/W.PET/CERES
3 CREAM (GRAM) TOPICAL
Qty: 90 TABLET | Refills: 0
Start: 2021-07-06 | End: 2021-09-16 | Stop reason: SDUPTHER

## 2021-07-06 NOTE — TELEPHONE ENCOUNTER
Called and spoke with patient for mirena insert  Explained to her that we have called for her several times and never heard back  She said she received them and apologized for not returning the phone calls  Patient said she will call back when she is on her next period to fill medication

## 2021-08-13 ENCOUNTER — TELEPHONE (OUTPATIENT)
Dept: FAMILY MEDICINE CLINIC | Facility: CLINIC | Age: 31
End: 2021-08-13

## 2021-08-13 NOTE — TELEPHONE ENCOUNTER
Patient's mother requesting a call from Rafael Burns, only message she wanted to leave was it was in regards to Coral's health  Advised Shelli out of office until Tuesday

## 2021-08-17 ENCOUNTER — TELEPHONE (OUTPATIENT)
Dept: FAMILY MEDICINE CLINIC | Facility: CLINIC | Age: 31
End: 2021-08-17

## 2021-08-17 NOTE — TELEPHONE ENCOUNTER
I returned call to Mom share in  She is concerned about Shanelle   States that she has been smoking a lot of cigarettes lately as well as using synthetic marijuana  States her daughter, Cheryl Rosenbaum is a other sister, has power of   She has reached out to the counselors at [facility]  None of them 1 will take her insurance  I explained that most of the rehab places that do inpatient or related more to alcohol and drugs  We did talk about the importance of her not smoking secondary to her history of her stroke  States that Shanelle will deny and never admit to anybody that she is doing this but they do know that she is doing it  I explained to her that getting Coral help relies a lot on Coral and what Shanelle was willing to admit to and that she does want to make changes  I suggested counseling  I also suggested maybe switching off the synthetic marijuana and consider medical marijuana  At least then we know it is more controlled and we are getting what we know were getting  Also said to her that we do have a list of doctors that they could see for this purpose  Also advised that if she admits that she is smoking tobacco and would like to get off, we do have some things that we could try for her to see if we could help her get off  Mom will get back to me

## 2021-08-31 ENCOUNTER — IMMUNIZATIONS (OUTPATIENT)
Dept: FAMILY MEDICINE CLINIC | Facility: HOSPITAL | Age: 31
End: 2021-08-31

## 2021-08-31 DIAGNOSIS — Z23 ENCOUNTER FOR IMMUNIZATION: Primary | ICD-10-CM

## 2021-08-31 PROCEDURE — 91301 SARS-COV-2 / COVID-19 MRNA VACCINE (MODERNA) 100 MCG: CPT

## 2021-08-31 PROCEDURE — 0011A SARS-COV-2 / COVID-19 MRNA VACCINE (MODERNA) 100 MCG: CPT

## 2021-09-03 ENCOUNTER — OFFICE VISIT (OUTPATIENT)
Dept: FAMILY MEDICINE CLINIC | Facility: CLINIC | Age: 31
End: 2021-09-03
Payer: COMMERCIAL

## 2021-09-03 VITALS
BODY MASS INDEX: 26.92 KG/M2 | OXYGEN SATURATION: 99 % | DIASTOLIC BLOOD PRESSURE: 70 MMHG | SYSTOLIC BLOOD PRESSURE: 110 MMHG | TEMPERATURE: 97.4 F | HEART RATE: 90 BPM | WEIGHT: 147.2 LBS | RESPIRATION RATE: 18 BRPM

## 2021-09-03 DIAGNOSIS — Z11.59 ENCOUNTER FOR HEPATITIS C SCREENING TEST FOR LOW RISK PATIENT: ICD-10-CM

## 2021-09-03 DIAGNOSIS — Z11.4 SCREENING FOR HIV (HUMAN IMMUNODEFICIENCY VIRUS): ICD-10-CM

## 2021-09-03 DIAGNOSIS — D50.9 IRON DEFICIENCY ANEMIA, UNSPECIFIED IRON DEFICIENCY ANEMIA TYPE: Primary | ICD-10-CM

## 2021-09-03 PROCEDURE — 99213 OFFICE O/P EST LOW 20 MIN: CPT | Performed by: NURSE PRACTITIONER

## 2021-09-03 NOTE — PROGRESS NOTES
Assessment/Plan:         Diagnoses and all orders for this visit:    Iron deficiency anemia, unspecified iron deficiency anemia type  -     CBC and differential; Future  -     Ferritin; Future  -     Basic metabolic panel; Future    Encounter for hepatitis C screening test for low risk patient  -     Hepatitis C antibody; Future    Screening for HIV (human immunodeficiency virus)  -     HIV 1/2 ANTIGEN/ANTIBODY (4TH GENERATION) W REFLEX [facility]; Future          Subjective:      Patient ID: Osman Landry is a 32 y o  female  HPI  Hook up with good [employer]   With [employer] and they will help with assistive devices  Will need clearance from her neurologist    Wears a brace on left arm and left leg    Did drive before CVA    Neuro cancelled her appt in July     Hasn't been back    Will need letter  PT cancelled month ago  OT on hold   Left had and arm with spastic movement  Left leg with drop foot and walks with shoe and brace  Limited ROM with left arm        The following portions of the patient's history were reviewed and updated as appropriate: allergies, current medications, past family history, past medical history, past social history, past surgical history and problem list     Review of Systems   Constitutional: Negative for activity change, appetite change and fatigue  HENT: Negative for congestion  Eyes: Negative for visual disturbance  Respiratory: Negative for cough and shortness of breath  Cardiovascular: Negative for chest pain  Musculoskeletal: Positive for gait problem  Negative for arthralgias and back pain  Skin: Negative for rash  Neurological: Positive for weakness  Negative for dizziness, light-headedness and headaches  Psychiatric/Behavioral: The patient is not nervous/anxious            Objective:  Vitals:    09/03/21 1521   BP: 110/70   BP Location: Left arm   Patient Position: Sitting   Cuff Size: Standard   Pulse: 90   Resp: 18   Temp: (!) 97 4 °F (36 3 °C)   TempSrc: Temporal   SpO2: 99%   Weight: 66 8 kg (147 lb 3 2 oz)      Physical Exam  Vitals reviewed  Constitutional:       Appearance: Normal appearance  Musculoskeletal:        Arms:         Legs:       Comments: ambulates with cane to stabilize    Neurological:      Mental Status: She is alert

## 2021-09-16 DIAGNOSIS — G81.14 SPASTIC HEMIPLEGIA AFFECTING LEFT NONDOMINANT SIDE, UNSPECIFIED ETIOLOGY (HCC): ICD-10-CM

## 2021-09-16 DIAGNOSIS — F32.A DEPRESSION, UNSPECIFIED DEPRESSION TYPE: ICD-10-CM

## 2021-09-16 RX ORDER — FLUOXETINE HYDROCHLORIDE 20 MG/1
20 CAPSULE ORAL DAILY
Qty: 90 CAPSULE | Refills: 0 | Status: SHIPPED | OUTPATIENT
Start: 2021-09-16 | End: 2021-12-26

## 2021-09-28 ENCOUNTER — IMMUNIZATIONS (OUTPATIENT)
Dept: FAMILY MEDICINE CLINIC | Facility: HOSPITAL | Age: 31
End: 2021-09-28

## 2021-09-28 DIAGNOSIS — Z23 ENCOUNTER FOR IMMUNIZATION: Primary | ICD-10-CM

## 2021-09-28 PROCEDURE — 91301 SARS-COV-2 / COVID-19 MRNA VACCINE (MODERNA) 100 MCG: CPT

## 2021-09-28 PROCEDURE — 0012A SARS-COV-2 / COVID-19 MRNA VACCINE (MODERNA) 100 MCG: CPT

## 2021-10-21 DIAGNOSIS — D68.51 HETEROZYGOUS FACTOR V LEIDEN MUTATION (HCC): ICD-10-CM

## 2021-11-17 DIAGNOSIS — I63.511 ACUTE ISCHEMIC RIGHT MCA STROKE (HCC): ICD-10-CM

## 2021-11-17 DIAGNOSIS — G81.14 SPASTIC HEMIPLEGIA AFFECTING LEFT NONDOMINANT SIDE, UNSPECIFIED ETIOLOGY (HCC): ICD-10-CM

## 2021-11-18 RX ORDER — AMLODIPINE BESYLATE 5 MG/1
5 TABLET ORAL DAILY
Qty: 90 TABLET | Refills: 0 | Status: SHIPPED | OUTPATIENT
Start: 2021-11-18 | End: 2022-01-05 | Stop reason: SDUPTHER

## 2021-12-10 ENCOUNTER — TELEPHONE (OUTPATIENT)
Dept: FAMILY MEDICINE CLINIC | Facility: CLINIC | Age: 31
End: 2021-12-10

## 2021-12-26 ENCOUNTER — HOSPITAL ENCOUNTER (EMERGENCY)
Facility: HOSPITAL | Age: 31
Discharge: HOME | End: 2021-12-26
Attending: EMERGENCY MEDICINE
Payer: COMMERCIAL

## 2021-12-26 ENCOUNTER — APPOINTMENT (EMERGENCY)
Dept: CT IMAGING | Facility: HOSPITAL | Age: 31
End: 2021-12-26
Payer: COMMERCIAL

## 2021-12-26 VITALS
SYSTOLIC BLOOD PRESSURE: 111 MMHG | WEIGHT: 147 LBS | HEART RATE: 85 BPM | OXYGEN SATURATION: 100 % | HEIGHT: 63 IN | RESPIRATION RATE: 16 BRPM | TEMPERATURE: 98.6 F | DIASTOLIC BLOOD PRESSURE: 67 MMHG | BODY MASS INDEX: 26.05 KG/M2

## 2021-12-26 DIAGNOSIS — N12 PYELONEPHRITIS: Primary | ICD-10-CM

## 2021-12-26 PROBLEM — N39.0 UTI (URINARY TRACT INFECTION): Status: ACTIVE | Noted: 2021-12-26

## 2021-12-26 LAB
ALBUMIN SERPL BCP-MCNC: 3.9 G/DL (ref 3.4–4.8)
ALP SERPL-CCNC: 89.1 U/L (ref 35–140)
ALT SERPL W P-5'-P-CCNC: 12 U/L (ref 5–54)
ANION GAP SERPL CALCULATED.3IONS-SCNC: 8 MMOL/L (ref 4–13)
AST SERPL W P-5'-P-CCNC: 11 U/L (ref 15–41)
BACTERIA UR QL AUTO: ABNORMAL /HPF
BASOPHILS # BLD AUTO: 0.02 THOUSANDS/ΜL (ref 0–0.1)
BASOPHILS NFR BLD AUTO: 0 % (ref 0–1)
BILIRUB SERPL-MCNC: 0.83 MG/DL (ref 0.3–1.2)
BILIRUB UR QL STRIP: NEGATIVE
BUN SERPL-MCNC: 6 MG/DL (ref 6–20)
CALCIUM SERPL-MCNC: 8.5 MG/DL (ref 8.4–10.2)
CHLORIDE SERPL-SCNC: 101 MMOL/L (ref 96–108)
CLARITY UR: CLEAR
CO2 SERPL-SCNC: 26 MMOL/L (ref 22–33)
COLOR UR: YELLOW
CREAT SERPL-MCNC: 0.76 MG/DL (ref 0.4–1.1)
EOSINOPHIL # BLD AUTO: 0.03 THOUSAND/ΜL (ref 0–0.61)
EOSINOPHIL NFR BLD AUTO: 0 % (ref 0–6)
ERYTHROCYTE [DISTWIDTH] IN BLOOD BY AUTOMATED COUNT: 15.9 % (ref 11.6–15.1)
EXT PREG TEST URINE: NEGATIVE
EXT. CONTROL ED NAV: NORMAL
GFR SERPL CREATININE-BSD FRML MDRD: 104 ML/MIN/1.73SQ M
GLUCOSE SERPL-MCNC: 99 MG/DL (ref 65–140)
GLUCOSE UR STRIP-MCNC: NEGATIVE MG/DL
HCT VFR BLD AUTO: 35 % (ref 34.8–46.1)
HGB BLD-MCNC: 11.5 G/DL (ref 11.5–15.4)
HGB UR QL STRIP.AUTO: ABNORMAL
IMM GRANULOCYTES # BLD AUTO: 0.04 THOUSAND/UL (ref 0–0.2)
IMM GRANULOCYTES NFR BLD AUTO: 0 % (ref 0–2)
KETONES UR STRIP-MCNC: NEGATIVE MG/DL
LEUKOCYTE ESTERASE UR QL STRIP: ABNORMAL
LIPASE SERPL-CCNC: 10 U/L (ref 13–60)
LYMPHOCYTES # BLD AUTO: 1.15 THOUSANDS/ΜL (ref 0.6–4.47)
LYMPHOCYTES NFR BLD AUTO: 11 % (ref 14–44)
MCH RBC QN AUTO: 27.2 PG (ref 26.8–34.3)
MCHC RBC AUTO-ENTMCNC: 32.9 G/DL (ref 31.4–37.4)
MCV RBC AUTO: 83 FL (ref 82–98)
MONOCYTES # BLD AUTO: 1.26 THOUSAND/ΜL (ref 0.17–1.22)
MONOCYTES NFR BLD AUTO: 12 % (ref 4–12)
NEUTROPHILS # BLD AUTO: 8.45 THOUSANDS/ΜL (ref 1.85–7.62)
NEUTS SEG NFR BLD AUTO: 77 % (ref 43–75)
NITRITE UR QL STRIP: POSITIVE
NON-SQ EPI CELLS URNS QL MICRO: ABNORMAL /HPF
NRBC BLD AUTO-RTO: 0 /100 WBCS
PH UR STRIP.AUTO: 6 [PH]
PLATELET # BLD AUTO: 275 THOUSANDS/UL (ref 149–390)
PMV BLD AUTO: 9 FL (ref 8.9–12.7)
POTASSIUM SERPL-SCNC: 3.5 MMOL/L (ref 3.5–5)
PROT SERPL-MCNC: 7 G/DL (ref 6.4–8.3)
PROT UR STRIP-MCNC: ABNORMAL MG/DL
RBC # BLD AUTO: 4.23 MILLION/UL (ref 3.81–5.12)
RBC #/AREA URNS AUTO: ABNORMAL /HPF
SODIUM SERPL-SCNC: 135 MMOL/L (ref 133–145)
SP GR UR STRIP.AUTO: 1.02 (ref 1–1.03)
UROBILINOGEN UR QL STRIP.AUTO: 0.2 E.U./DL
WBC # BLD AUTO: 10.95 THOUSAND/UL (ref 4.31–10.16)
WBC #/AREA URNS AUTO: ABNORMAL /HPF

## 2021-12-26 PROCEDURE — 96375 TX/PRO/DX INJ NEW DRUG ADDON: CPT

## 2021-12-26 PROCEDURE — 74177 CT ABD & PELVIS W/CONTRAST: CPT

## 2021-12-26 PROCEDURE — 87077 CULTURE AEROBIC IDENTIFY: CPT | Performed by: EMERGENCY MEDICINE

## 2021-12-26 PROCEDURE — 36415 COLL VENOUS BLD VENIPUNCTURE: CPT | Performed by: EMERGENCY MEDICINE

## 2021-12-26 PROCEDURE — 96365 THER/PROPH/DIAG IV INF INIT: CPT

## 2021-12-26 PROCEDURE — 80053 COMPREHEN METABOLIC PANEL: CPT | Performed by: EMERGENCY MEDICINE

## 2021-12-26 PROCEDURE — 81025 URINE PREGNANCY TEST: CPT | Performed by: EMERGENCY MEDICINE

## 2021-12-26 PROCEDURE — 87186 SC STD MICRODIL/AGAR DIL: CPT | Performed by: EMERGENCY MEDICINE

## 2021-12-26 PROCEDURE — 85025 COMPLETE CBC W/AUTO DIFF WBC: CPT | Performed by: EMERGENCY MEDICINE

## 2021-12-26 PROCEDURE — 99284 EMERGENCY DEPT VISIT MOD MDM: CPT

## 2021-12-26 PROCEDURE — 81001 URINALYSIS AUTO W/SCOPE: CPT | Performed by: EMERGENCY MEDICINE

## 2021-12-26 PROCEDURE — 87086 URINE CULTURE/COLONY COUNT: CPT | Performed by: EMERGENCY MEDICINE

## 2021-12-26 PROCEDURE — 99284 EMERGENCY DEPT VISIT MOD MDM: CPT | Performed by: EMERGENCY MEDICINE

## 2021-12-26 PROCEDURE — 83690 ASSAY OF LIPASE: CPT | Performed by: EMERGENCY MEDICINE

## 2021-12-26 PROCEDURE — 96361 HYDRATE IV INFUSION ADD-ON: CPT

## 2021-12-26 RX ORDER — KETOROLAC TROMETHAMINE 30 MG/ML
30 INJECTION, SOLUTION INTRAMUSCULAR; INTRAVENOUS ONCE
Status: COMPLETED | OUTPATIENT
Start: 2021-12-26 | End: 2021-12-26

## 2021-12-26 RX ORDER — CEFTRIAXONE 1 G/50ML
1000 INJECTION, SOLUTION INTRAVENOUS ONCE
Status: COMPLETED | OUTPATIENT
Start: 2021-12-26 | End: 2021-12-26

## 2021-12-26 RX ORDER — CIPROFLOXACIN 500 MG/1
500 TABLET, FILM COATED ORAL EVERY 12 HOURS SCHEDULED
Qty: 14 TABLET | Refills: 0 | Status: SHIPPED | OUTPATIENT
Start: 2021-12-26 | End: 2022-01-02

## 2021-12-26 RX ADMIN — KETOROLAC TROMETHAMINE 30 MG: 30 INJECTION, SOLUTION INTRAMUSCULAR at 20:45

## 2021-12-26 RX ADMIN — IOHEXOL 100 ML: 350 INJECTION, SOLUTION INTRAVENOUS at 21:48

## 2021-12-26 RX ADMIN — SODIUM CHLORIDE 1000 ML: 0.9 INJECTION, SOLUTION INTRAVENOUS at 20:45

## 2021-12-26 RX ADMIN — CEFTRIAXONE 1000 MG: 1 INJECTION, SOLUTION INTRAVENOUS at 22:23

## 2021-12-29 LAB — BACTERIA UR CULT: ABNORMAL

## 2021-12-30 ENCOUNTER — TELEPHONE (OUTPATIENT)
Dept: FAMILY MEDICINE CLINIC | Facility: CLINIC | Age: 31
End: 2021-12-30

## 2022-01-04 DIAGNOSIS — G81.14 SPASTIC HEMIPLEGIA AFFECTING LEFT NONDOMINANT SIDE, UNSPECIFIED ETIOLOGY (HCC): ICD-10-CM

## 2022-01-04 RX ORDER — ATORVASTATIN CALCIUM 80 MG/1
80 TABLET, FILM COATED ORAL DAILY
Qty: 90 TABLET | Refills: 0 | Status: SHIPPED | OUTPATIENT
Start: 2022-01-04 | End: 2022-04-11

## 2022-01-05 DIAGNOSIS — G81.14 SPASTIC HEMIPLEGIA AFFECTING LEFT NONDOMINANT SIDE, UNSPECIFIED ETIOLOGY (HCC): ICD-10-CM

## 2022-01-05 DIAGNOSIS — I63.511 ACUTE ISCHEMIC RIGHT MCA STROKE (HCC): ICD-10-CM

## 2022-01-06 RX ORDER — AMLODIPINE BESYLATE 5 MG/1
5 TABLET ORAL DAILY
Qty: 90 TABLET | Refills: 0 | Status: SHIPPED | OUTPATIENT
Start: 2022-01-06 | End: 2022-01-07 | Stop reason: SDUPTHER

## 2022-01-07 DIAGNOSIS — I63.511 ACUTE ISCHEMIC RIGHT MCA STROKE (HCC): ICD-10-CM

## 2022-01-07 DIAGNOSIS — G81.14 SPASTIC HEMIPLEGIA AFFECTING LEFT NONDOMINANT SIDE, UNSPECIFIED ETIOLOGY (HCC): ICD-10-CM

## 2022-01-10 RX ORDER — AMLODIPINE BESYLATE 5 MG/1
5 TABLET ORAL DAILY
Qty: 90 TABLET | Refills: 0 | Status: SHIPPED | OUTPATIENT
Start: 2022-01-10

## 2022-03-07 ENCOUNTER — OFFICE VISIT (OUTPATIENT)
Dept: FAMILY MEDICINE CLINIC | Facility: CLINIC | Age: 32
End: 2022-03-07
Payer: COMMERCIAL

## 2022-03-07 VITALS
TEMPERATURE: 97 F | BODY MASS INDEX: 28.49 KG/M2 | RESPIRATION RATE: 18 BRPM | SYSTOLIC BLOOD PRESSURE: 124 MMHG | OXYGEN SATURATION: 100 % | WEIGHT: 160.8 LBS | HEIGHT: 63 IN | HEART RATE: 85 BPM | DIASTOLIC BLOOD PRESSURE: 82 MMHG

## 2022-03-07 DIAGNOSIS — Z86.73 HISTORY OF ISCHEMIC RIGHT MCA STROKE: ICD-10-CM

## 2022-03-07 DIAGNOSIS — E55.9 VITAMIN D DEFICIENCY: ICD-10-CM

## 2022-03-07 DIAGNOSIS — Z00.00 ANNUAL PHYSICAL EXAM: Primary | ICD-10-CM

## 2022-03-07 PROCEDURE — 99395 PREV VISIT EST AGE 18-39: CPT | Performed by: NURSE PRACTITIONER

## 2022-03-07 NOTE — PROGRESS NOTES
Douglas Cadena is a 32 y o   female and is here for routine health maintenance  The patient reports no problems  History of Present Illness     Patient presents for annual physical        Well Adult Physical   Patient here for a comprehensive physical exam       Diet and Physical Activity  Diet: well balancedWeight concerns: Patient is overweight (BMI 25 0-29  9)  Exercise: never      Depression Screen  PHQ-2/9 Depression Screening    Little interest or pleasure in doing things: 0 - not at all  Feeling down, depressed, or hopeless: 0 - not at all  PHQ-2 Score: 0  PHQ-2 Interpretation: Negative depression screen          General Health  Hearing: Normal:  bilateral  Vision: no vision problems and most recent eye exam >1 year  Dental: no dental visits for >1 year, brushes teeth three times daily and does not floss     History:  LMP: 22  Menopause at NA years  :0  Para: 0    Cancer Screening  Colononoscopy NA  Mammogram NA   Pap Unkown  Abnormal pap? no  Smoker yes 1-2 cigg a day Annual screening with low-dose helical computed tomography (CT) for patients age 54 to 76 years with history of smoking at least 30 pack-years and, if a former smoker, had quit within the previous 15 years        The following portions of the patient's history were reviewed and updated as appropriate: allergies, current medications, past family history, past medical history, past social history, past surgical history and problem list     Review of Systems     Review of Systems   Constitutional: Positive for fatigue  Negative for chills and fever  HENT: Negative for congestion, ear pain, postnasal drip, rhinorrhea, sinus pressure and sore throat  Eyes: Negative for pain and visual disturbance  Respiratory: Negative for cough, shortness of breath and wheezing  Cardiovascular: Negative for chest pain, palpitations and leg swelling     Gastrointestinal: Negative for abdominal pain, blood in stool, constipation, diarrhea, nausea and vomiting  Endocrine: Negative for cold intolerance, heat intolerance, polydipsia, polyphagia and polyuria  Genitourinary: Positive for menstrual problem (duration shorter)  Negative for dysuria, frequency, hematuria, pelvic pain, urgency and vaginal discharge  Musculoskeletal: Negative for arthralgias and back pain  Gait problem: uses cane and left lower leg brace  Skin: Negative for rash  Allergic/Immunologic: Negative for environmental allergies and food allergies  Neurological: Positive for weakness (left sided)  Negative for dizziness and headaches  Hematological: Does not bruise/bleed easily  Psychiatric/Behavioral: Negative for dysphoric mood  The patient is not nervous/anxious          Past Medical History     Past Medical History:   Diagnosis Date    Bronchitis     Embolus [facility] [address] )     Hypertension     Stroke (cerebrum) ([facility] )        Past Surgical History     Past Surgical History:   Procedure Laterality Date    TONSILLECTOMY  09/01/2013       Social History     Social History     Socioeconomic History    Marital status: Single     Spouse name: None    Number of children: None    Years of education: None    Highest education level: None   Occupational History    None   Tobacco Use    Smoking status: Current Some Day Smoker    Smokeless tobacco: Never Used    Tobacco comment: 2 cigarettes per day   Vaping Use    Vaping Use: Never used   Substance and Sexual Activity    Alcohol use: Not Currently     Comment: rarely    Drug use: No    Sexual activity: Not Currently   Other Topics Concern    None   Social History Narrative    None     Social Determinants of Health     Financial Resource Strain: Not on file   Food Insecurity: Not on file   Transportation Needs: Not on file   Physical Activity: Not on file   Stress: Not on file   Social Connections: Not on file   Intimate Partner Violence: Not on file   Housing Stability: Not on file       Family History     Family History   Problem Relation Age of Onset    Hypertension Mother     Diabetes Maternal Grandfather        Current Medications       Current Outpatient Medications:     amLODIPine (NORVASC) 5 mg tablet, Take 1 tablet (5 mg total) by mouth daily, Disp: 90 tablet, Rfl: 0    apixaban (Eliquis) 5 mg, Take 1 tablet (5 mg total) by mouth 2 (two) times a day, Disp: 180 tablet, Rfl: 0    atorvastatin (LIPITOR) 80 mg tablet, Take 1 tablet (80 mg total) by mouth daily, Disp: 90 tablet, Rfl: 0    famotidine (PEPCID) 20 mg tablet, Take 1 tablet (20 mg total) by mouth 2 (two) times a day, Disp: 180 tablet, Rfl: 0    levonorgestrel (MIRENA) 20 MCG/24HR IUD, 1 Intra Uterine Device by Intrauterine route once for 1 dose, Disp: 1 each, Rfl: 0    melatonin 3 mg, Take 1 tablet (3 mg total) by mouth daily at bedtime as needed (sleep) (Patient not taking: Reported on 12/26/2021 ), Disp: 90 tablet, Rfl: 0     Allergies     Allergies   Allergen Reactions    Azithromycin Anaphylaxis       Objective     /82 (BP Location: Right arm, Patient Position: Sitting, Cuff Size: Standard)   Pulse 85   Temp (!) 97 °F (36 1 °C) (Tympanic)   Resp 18   Ht 5' 3" (1 6 m)   Wt 72 9 kg (160 lb 12 8 oz)   SpO2 100%   BMI 28 48 kg/m²   Wt Readings from Last 3 Encounters:   03/07/22 72 9 kg (160 lb 12 8 oz)   12/26/21 66 7 kg (147 lb)   09/03/21 66 8 kg (147 lb 3 2 oz)     BP Readings from Last 3 Encounters:   03/07/22 124/82   12/26/21 111/67   09/03/21 110/70     Pulse Readings from Last 3 Encounters:   03/07/22 85   12/26/21 85   09/03/21 90     Body mass index is 28 48 kg/m²  Physical Exam  Constitutional:       Appearance: Normal appearance  She is normal weight  HENT:      Head: Normocephalic and atraumatic  Right Ear: Tympanic membrane normal  There is no impacted cerumen  Left Ear: There is impacted cerumen        Nose: Nose normal       Mouth/Throat:      Mouth: Mucous membranes are moist  Pharynx: Oropharynx is clear  Eyes:      Pupils: Pupils are equal, round, and reactive to light  Cardiovascular:      Rate and Rhythm: Normal rate and regular rhythm  Pulses: Normal pulses  Heart sounds: Normal heart sounds  Pulmonary:      Effort: Pulmonary effort is normal       Breath sounds: Normal breath sounds  Abdominal:      General: Abdomen is flat  Bowel sounds are normal  There is no distension  Palpations: Abdomen is soft  Tenderness: There is no abdominal tenderness  There is no right CVA tenderness, left CVA tenderness or guarding  Musculoskeletal:         General: Normal range of motion  Cervical back: Normal range of motion and neck supple  No rigidity or tenderness  Skin:     General: Skin is warm and dry  Capillary Refill: Capillary refill takes less than 2 seconds  Neurological:      General: No focal deficit present  Mental Status: She is alert and oriented to person, place, and time  Mental status is at baseline  Cranial Nerves: No cranial nerve deficit  Motor: Weakness (L arm and L leg) present  Psychiatric:         Mood and Affect: Mood normal          Behavior: Behavior normal          Thought Content:  Thought content normal            No exam data present    Health Maintenance     Health Maintenance   Topic Date Due    Hepatitis C Screening  Never done    Pneumococcal Vaccine: Pediatrics (0 to 5 Years) and At-Risk Patients (6 to 59 Years) (1 of 2 - PPSV23) Never done    HIV Screening  Never done    Cervical Cancer Screening  Never done    Annual Physical  12/11/2021    COVID-19 Vaccine (3 - Booster for Moderna series) 02/28/2022    Influenza Vaccine (1) 06/30/2022 (Originally 9/1/2021)    Depression Screening  03/07/2023    BMI: Followup Plan  03/07/2023    BMI: Adult  03/07/2023    DTaP,Tdap,and Td Vaccines (6 - Td or Tdap) 08/09/2028    HIB Vaccine  Completed    Hepatitis B Vaccine  Completed    IPV Vaccine Completed    Meningococcal ACWY Vaccine  Completed    Hepatitis A Vaccine  Aged Out    HPV Vaccine  Aged Out     Immunization History   Administered Date(s) Administered    COVID-19 MODERNA VACC 0 5 ML IM 08/31/2021, 09/28/2021    DT (pediatric) 1990, 01/05/2001    DTaP 1990, 1990, 09/04/1991, 04/28/1995    Hep B, Adolescent or Pediatric 05/04/1993    Hep B, adult 06/03/1993, 08/12/1999    HiB 02/04/1991, 04/25/1991, 06/27/1991, 09/04/1991    IPV 1990, 1990, 09/04/1991, 04/28/1995    Influenza, injectable, quadrivalent, preservative free 0 5 mL 12/11/2020    MMR 06/27/1991, 08/12/1999    Meningococcal Conjugate (MCV4O) 06/08/2007    Meningococcal MCV4P 06/08/2007    Tdap 08/09/2018    Varicella 08/12/1991         Laboratory Results:   Lab Results   Component Value Date    WBC 10 95 (H) 12/26/2021    WBC 14 41 (H) 11/05/2020    WBC 12 33 (H) 10/07/2020    HGB 11 5 12/26/2021    HGB 12 8 11/05/2020    HGB 12 7 10/07/2020    HCT 35 0 12/26/2021    HCT 40 7 11/05/2020    HCT 39 4 10/07/2020    MCV 83 12/26/2021    MCV 92 11/05/2020    MCV 93 10/07/2020     12/26/2021     (H) 11/05/2020     10/07/2020     Lab Results   Component Value Date    BUN 6 12/26/2021    BUN 8 11/05/2020    BUN 11 10/07/2020     Lab Results   Component Value Date    GLUC 99 12/26/2021    GLUC 91 11/05/2020    GLUC 78 10/07/2020    ALT 12 12/26/2021    ALT 18 11/05/2020    ALT 35 10/07/2020    AST 11 (L) 12/26/2021    AST 14 11/05/2020    AST 12 10/07/2020     No results found for: TSH  No results found for: A1C    Lipid Profile:   No results found for: CHOL  No results found for: HDL  No results found for: LDLC  No results found for: LDLCALC  No results found for: TRIG              Assessment/Plan     1  Annual physical exam  -     CBC and differential; Future  -     Comprehensive metabolic panel; Future  -     Lipid panel; Future    2   BMI 28 0-28 9,adult  Comments:  Discussed diet and exercise  Orders:  -     TSH, 3rd generation with Free T4 reflex; Future    3  History of ischemic right MCA stroke  Comments: Followed by Stroke Rehab      4  Vitamin D deficiency  -     Vitamin D 25 hydroxy; Future          1  Healthy female exam   2  Patient Counseling:   · Nutrition: Stressed importance of a well balanced diet, moderation of sodium/saturated fat, caloric balance and sufficient intake of fiber  · Exercise: Stressed the importance of regular exercise with a goal of 150 minutes per week  · Dental Health: Discussed daily flossing and brushing and regular dental visits   · Sexuality: Discussed sexually transmitted infections, use of condoms and prevention of unintended pregnancy  · Alcohol Use:  Recommended moderation of alcohol intake  · Injury Prevention: Discussed Safety Belts, Safety Helmets, and Smoke Detectors    · Immunizations reviewed  yes  · Discussed benefits of screening yes  · Discussed the patient's BMI with her  The BMI is in the acceptable range  3  Cancer Screening yes  4  Labs ordered  6  Follow up in one year      ZOE Knowles

## 2022-03-07 NOTE — PROGRESS NOTES
BMI Counseling: Body mass index is 28 48 kg/m²  The BMI is above normal  Nutrition recommendations include reducing portion sizes, decreasing overall calorie intake, 3-5 servings of fruits/vegetables daily, reducing fast food intake, consuming healthier snacks, decreasing soda and/or juice intake, moderation in carbohydrate intake, increasing intake of lean protein, reducing intake of saturated fat and trans fat and reducing intake of cholesterol  Exercise recommendations include moderate aerobic physical activity for 150 minutes/week, exercising 3-5 times per week and strength training exercises

## 2022-03-07 NOTE — PATIENT INSTRUCTIONS
Heart Healthy Diet   WHAT YOU NEED TO KNOW:   A heart healthy diet is an eating plan low in unhealthy fats and sodium (salt)  The plan is high in healthy fats and fiber  A heart healthy diet helps improve your cholesterol levels and lowers your risk for heart disease and stroke  A dietitian will teach you how to read and understand food labels  DISCHARGE INSTRUCTIONS:   Heart healthy diet guidelines to follow:   · Choose foods that contain healthy fats  ? Unsaturated fats  include monounsaturated and polyunsaturated fats  Unsaturated fat is found in foods such as soybean, canola, olive, corn, and safflower oils  It is also found in soft tub margarine that is made with liquid vegetable oil  ? Omega-3 fat  is found in certain fish, such as salmon, tuna, and trout, and in walnuts and flaxseed  Eat fish high in omega-3 fats at least 2 times a week  · Get 20 to 30 grams of fiber each day  Fruits, vegetables, whole-grain foods, and legumes (cooked beans) are good sources of fiber  · Limit or do not have unhealthy fats  ? Cholesterol  is found in animal foods, such as eggs and lobster, and in dairy products made from whole milk  Limit cholesterol to less than 200 mg each day  ? Saturated fat  is found in meats, such as helm and hamburger  It is also found in chicken or turkey skin, whole milk, and butter  Limit saturated fat to less than 7% of your total daily calories  ? Trans fat  is found in packaged foods, such as potato chips and cookies  It is also in hard margarine, some fried foods, and shortening  Do not eat foods that contain trans fats  · Limit sodium as directed  You may be told to limit sodium to 2,000 to 2,300 mg each day  Choose low-sodium or no-salt-added foods  Add little or no salt to food you prepare  Use herbs and spices in place of salt         Include the following in your heart healthy plan:  Ask your dietitian or healthcare provider how many servings to have from each of the following food groups:  · Grains:      ? Whole-wheat breads, cereals, and pastas, and brown rice    ? Low-fat, low-sodium crackers and chips    · Vegetables:      ? Broccoli, green beans, green peas, and spinach    ? Collards, kale, and lima beans    ? Carrots, sweet potatoes, tomatoes, and peppers    ? Canned vegetables with no salt added    · Fruits:      ? Bananas, peaches, pears, and pineapple    ? Grapes, raisins, and dates    ? Oranges, tangerines, grapefruit, orange juice, and grapefruit juice    ? Apricots, mangoes, melons, and papaya    ? Raspberries and strawberries    ? Canned fruit with no added sugar    · Low-fat dairy:      ? Nonfat (skim) milk, 1% milk, and low-fat almond, cashew, or soy milks fortified with calcium    ? Low-fat cheese, regular or frozen yogurt, and cottage cheese    · Meats and proteins:      ? Lean cuts of beef and pork (loin, leg, round), skinless chicken and turkey    ? Legumes, soy products, egg whites, or nuts    Limit or do not include the following in your heart healthy plan:   · Unhealthy fats and oils:      ? Whole or 2% milk, cream cheese, sour cream, or cheese    ? High-fat cuts of beef (T-bone steaks, ribs), chicken or turkey with skin, and organ meats such as liver    ? Butter, stick margarine, shortening, and cooking oils such as coconut or palm oil    · Foods and liquids high in sodium:      ? Packaged foods, such as frozen dinners, cookies, macaroni and cheese, and cereals with more than 300 mg of sodium per serving    ? Vegetables with added sodium, such as instant potatoes, vegetables with added sauces, or regular canned vegetables    ? Cured or smoked meats, such as hot dogs, helm, and sausage    ? High-sodium ketchup, barbecue sauce, salad dressing, pickles, olives, soy sauce, or miso    · Foods and liquids high in sugar:      ? Candy, cake, cookies, pies, or doughnuts    ? Soft drinks (soda), sports drinks, or sweetened tea    ?  Canned or dry mixes for cakes, soups, sauces, or gravies    Other healthy heart guidelines:   · Do not smoke  Nicotine and other chemicals in cigarettes and cigars can cause lung and heart damage  Ask your healthcare provider for information if you currently smoke and need help to quit  E-cigarettes or smokeless tobacco still contain nicotine  Talk to your healthcare provider before you use these products  · Limit or do not drink alcohol as directed  Alcohol can damage your heart and raise your blood pressure  Your healthcare provider may give you specific daily and weekly limits  The general recommended limit is 1 drink a day for women 21 or older and for men 72 or older  Do not have more than 3 drinks in a day or 7 in a week  The recommended limit is 2 drinks a day for men 24to 59years of age  Do not have more than 4 drinks in a day or 14 in a week  A drink of alcohol is 12 ounces of beer, 5 ounces of wine, or 1½ ounces of liquor  · Exercise regularly  Exercise can help you maintain a healthy weight and improve your blood pressure and cholesterol levels  Regular exercise can also decrease your risk for heart problems  Ask your healthcare provider about the best exercise plan for you  Do not start an exercise program without asking your healthcare provider  Follow up with your doctor or cardiologist as directed:  Write down your questions so you remember to ask them during your visits  © Copyright [employer] 2022 Information is for End User's use only and may not be sold, redistributed or otherwise used for commercial purposes  All illustrations and images included in CareNotes® are the copyrighted property of A D A M , Inc  or [facility] [employer]anco   The above information is an  only  It is not intended as medical advice for individual conditions or treatments  Talk to your doctor, nurse or pharmacist before following any medical regimen to see if it is safe and effective for you

## 2022-03-09 ENCOUNTER — OFFICE VISIT (OUTPATIENT)
Dept: FAMILY MEDICINE CLINIC | Facility: CLINIC | Age: 32
End: 2022-03-09
Payer: COMMERCIAL

## 2022-03-09 VITALS
HEIGHT: 63 IN | BODY MASS INDEX: 28.53 KG/M2 | OXYGEN SATURATION: 99 % | TEMPERATURE: 98.1 F | SYSTOLIC BLOOD PRESSURE: 118 MMHG | WEIGHT: 161 LBS | HEART RATE: 97 BPM | RESPIRATION RATE: 18 BRPM | DIASTOLIC BLOOD PRESSURE: 82 MMHG

## 2022-03-09 DIAGNOSIS — F51.01 PRIMARY INSOMNIA: Primary | ICD-10-CM

## 2022-03-09 DIAGNOSIS — I63.81 STROKE OF RIGHT BASAL GANGLIA (HCC): ICD-10-CM

## 2022-03-09 PROCEDURE — 99214 OFFICE O/P EST MOD 30 MIN: CPT | Performed by: NURSE PRACTITIONER

## 2022-03-09 RX ORDER — TRAZODONE HYDROCHLORIDE 50 MG/1
50 TABLET ORAL
Qty: 30 TABLET | Refills: 2 | Status: SHIPPED | OUTPATIENT
Start: 2022-03-09 | End: 2022-07-11 | Stop reason: SDUPTHER

## 2022-03-09 NOTE — PROGRESS NOTES
Assessment/Plan:         Diagnoses and all orders for this visit:    Primary insomnia  -     traZODone (DESYREL) 50 mg tablet; Take 1 tablet (50 mg total) by mouth daily at bedtime          Subjective:      Patient ID: Viji Todd is a 32 y o  female  HPI  Here for form to return to work   [employer] back on 2/4 and needs form for limitations   Needs stool which was taken away   Limted use of left hand and arm    Uses right       Trouble sleeping melatonin not helping     The following portions of the patient's history were reviewed and updated as appropriate: allergies, current medications, past family history, past medical history, past social history, past surgical history and problem list     Review of Systems   Constitutional: Negative for activity change, appetite change, chills, fatigue and fever  HENT: Negative for congestion, ear pain and sore throat  Eyes: Negative for pain and visual disturbance  Respiratory: Negative for cough and shortness of breath  Cardiovascular: Negative for chest pain and palpitations  Gastrointestinal: Negative for abdominal pain and vomiting  Genitourinary: Negative for dysuria, frequency, hematuria and urgency  Musculoskeletal: Positive for gait problem  Negative for arthralgias and back pain  Skin: Negative for color change and rash  Neurological: Negative for dizziness, seizures, syncope, light-headedness, numbness and headaches  Psychiatric/Behavioral: Positive for sleep disturbance  The patient is not nervous/anxious  All other systems reviewed and are negative  Objective:  Vitals:    03/09/22 1445   BP: 118/82   BP Location: Right arm   Patient Position: Sitting   Cuff Size: Standard   Pulse: 97   Resp: 18   Temp: 98 1 °F (36 7 °C)   TempSrc: Tympanic   SpO2: 99%   Weight: 73 kg (161 lb)   Height: 5' 3" (1 6 m)      Physical Exam  Vitals reviewed  Constitutional:       Appearance: Normal appearance  She is well-developed     HENT:      Head: Normocephalic  Right Ear: Tympanic membrane, ear canal and external ear normal       Left Ear: Tympanic membrane, ear canal and external ear normal    Eyes:      General:         Right eye: No discharge  Left eye: No discharge  Conjunctiva/sclera: Conjunctivae normal       Pupils: Pupils are equal, round, and reactive to light  Neck:      Thyroid: No thyromegaly  Cardiovascular:      Rate and Rhythm: Normal rate and regular rhythm  Pulses: Normal pulses  Heart sounds: Normal heart sounds  No murmur heard  Pulmonary:      Effort: Pulmonary effort is normal       Breath sounds: Normal breath sounds  Abdominal:      General: Bowel sounds are normal       Palpations: Abdomen is soft  Tenderness: There is no abdominal tenderness  Musculoskeletal:         General: Normal range of motion  Cervical back: Normal range of motion and neck supple  Comments: Left arm paralysis     Brace on left leg      Walking with cane        Lymphadenopathy:      Cervical: No cervical adenopathy  Skin:     General: Skin is warm  Findings: No rash  Neurological:      General: No focal deficit present  Mental Status: She is alert and oriented to person, place, and time  Psychiatric:         Mood and Affect: Mood normal          Behavior: Behavior normal          Thought Content:  Thought content normal          Judgment: Judgment normal

## 2022-04-05 ENCOUNTER — TELEPHONE (OUTPATIENT)
Dept: FAMILY MEDICINE CLINIC | Facility: CLINIC | Age: 32
End: 2022-04-05

## 2022-04-07 ENCOUNTER — DOCUMENTATION (OUTPATIENT)
Dept: FAMILY MEDICINE CLINIC | Facility: CLINIC | Age: 32
End: 2022-04-07

## 2022-04-07 ENCOUNTER — TELEPHONE (OUTPATIENT)
Dept: FAMILY MEDICINE CLINIC | Facility: CLINIC | Age: 32
End: 2022-04-07

## 2022-04-07 NOTE — TELEPHONE ENCOUNTER
Left message stating that disability form is ready for    Message also sent to patient via [phone]

## 2022-04-09 DIAGNOSIS — G81.14 SPASTIC HEMIPLEGIA AFFECTING LEFT NONDOMINANT SIDE, UNSPECIFIED ETIOLOGY (HCC): ICD-10-CM

## 2022-04-11 RX ORDER — ATORVASTATIN CALCIUM 80 MG/1
TABLET, FILM COATED ORAL
Qty: 90 TABLET | Refills: 0 | Status: SHIPPED | OUTPATIENT
Start: 2022-04-11

## 2022-05-18 ENCOUNTER — OFFICE VISIT (OUTPATIENT)
Dept: FAMILY MEDICINE CLINIC | Facility: CLINIC | Age: 32
End: 2022-05-18
Payer: COMMERCIAL

## 2022-05-18 VITALS
BODY MASS INDEX: 28.06 KG/M2 | DIASTOLIC BLOOD PRESSURE: 80 MMHG | RESPIRATION RATE: 18 BRPM | SYSTOLIC BLOOD PRESSURE: 110 MMHG | OXYGEN SATURATION: 99 % | HEART RATE: 92 BPM | WEIGHT: 158.4 LBS | TEMPERATURE: 97.5 F

## 2022-05-18 DIAGNOSIS — I63.511 ACUTE ISCHEMIC RIGHT MCA STROKE (HCC): ICD-10-CM

## 2022-05-18 DIAGNOSIS — G81.14 SPASTIC HEMIPLEGIA AFFECTING LEFT NONDOMINANT SIDE, UNSPECIFIED ETIOLOGY (HCC): Primary | ICD-10-CM

## 2022-05-18 PROCEDURE — 99213 OFFICE O/P EST LOW 20 MIN: CPT | Performed by: NURSE PRACTITIONER

## 2022-05-18 NOTE — PROGRESS NOTES
Assessment/Plan:         Diagnoses and all orders for this visit:    Spastic hemiplegia affecting left nondominant side, unspecified etiology ([address] )    Acute ischemic right MCA stroke (HCC)          Subjective:      Patient ID: Sunny Rodriguez is a 28 y o  female  HPI  Here for drivers PE     Has had course at [employer]    Has adaptive device  Splint on left hand and wrist and on left leg  Full movement with right arm and leg  The following portions of the patient's history were reviewed and updated as appropriate: allergies, current medications, past family history, past medical history, past social history, past surgical history and problem list     Review of Systems   Constitutional: Negative for activity change and chills  HENT: Negative for congestion and rhinorrhea  Respiratory: Negative for shortness of breath  Genitourinary: Negative for frequency  Musculoskeletal: Positive for gait problem  Neurological: Negative for dizziness, light-headedness, numbness and headaches  Psychiatric/Behavioral: The patient is not nervous/anxious  Objective:  Vitals:    05/18/22 1514   BP: 110/80   BP Location: Left arm   Patient Position: Sitting   Cuff Size: Standard   Pulse: 92   Resp: 18   Temp: 97 5 °F (36 4 °C)   TempSrc: Temporal   SpO2: 99%   Weight: 71 8 kg (158 lb 6 4 oz)      Physical Exam  Vitals reviewed  Constitutional:       Appearance: Normal appearance  Cardiovascular:      Rate and Rhythm: Normal rate and regular rhythm  Pulses: Normal pulses  Heart sounds: Normal heart sounds  Pulmonary:      Effort: Pulmonary effort is normal       Breath sounds: Normal breath sounds  Musculoskeletal:        Arms:         Legs:    Neurological:      General: No focal deficit present  Mental Status: She is alert and oriented to person, place, and time

## 2022-05-18 NOTE — LETTER
May 18, 2022     Gabo Weaver  [address] [address]    Patient: Gabo Weaver   YOB: 1990   Date of Visit: 5/18/2022     Coral  Had a stroke back on 8/4/2020  She has had no further incidence of this    She does have nondominate left side hemiplegia  She has full movement on right side  She mentally if fully intact  She currently is on eliquis, amlodipine and lipitor for preventive measure which will not interfere with her driving  If you have questions, please do not hesitate to call me  Sincerely,        ZOE Jaramillo        CC: No Recipients  ZOE Jaramillo  5/18/2022  3:32 PM  Incomplete  Assessment/Plan:         There are no diagnoses linked to this encounter  Subjective:      Patient ID: Gabo Weaver is a 28 y o  female  HPI  Here for drivers PE     [facility]    Has adaptive device  Splint on left hand and wrist and on left leg  Full movement with right arm and leg  The following portions of the patient's history were reviewed and updated as appropriate: allergies, current medications, past family history, past medical history, past social history, past surgical history and problem list     Review of Systems   Constitutional: Negative for activity change and chills  HENT: Negative for congestion and rhinorrhea  Respiratory: Negative for shortness of breath  Genitourinary: Negative for frequency  Musculoskeletal: Positive for gait problem  Neurological: Negative for dizziness, light-headedness, numbness and headaches  Psychiatric/Behavioral: The patient is not nervous/anxious  Objective:  Vitals:    05/18/22 1514   BP: 110/80   BP Location: Left arm   Patient Position: Sitting   Cuff Size: Standard   Pulse: 92   Resp: 18   Temp: 97 5 °F (36 4 °C)   TempSrc: Temporal   SpO2: 99%   Weight: 71 8 kg (158 lb 6 4 oz)      Physical Exam  Vitals reviewed     Constitutional:       Appearance: Normal appearance  Musculoskeletal:        Arms:         Legs:    Neurological:      Mental Status: She is alert

## 2022-05-27 DIAGNOSIS — D68.51 HETEROZYGOUS FACTOR V LEIDEN MUTATION (HCC): ICD-10-CM

## 2022-05-27 RX ORDER — APIXABAN 5 MG/1
TABLET, FILM COATED ORAL
Qty: 180 TABLET | Refills: 0 | Status: SHIPPED | OUTPATIENT
Start: 2022-05-27

## 2022-07-11 DIAGNOSIS — F51.01 PRIMARY INSOMNIA: ICD-10-CM

## 2022-07-11 RX ORDER — TRAZODONE HYDROCHLORIDE 50 MG/1
50 TABLET ORAL
Qty: 30 TABLET | Refills: 2 | Status: SHIPPED | OUTPATIENT
Start: 2022-07-11

## 2022-07-12 ENCOUNTER — DOCUMENTATION (OUTPATIENT)
Dept: FAMILY MEDICINE CLINIC | Facility: CLINIC | Age: 32
End: 2022-07-12

## 2022-07-12 ENCOUNTER — TELEPHONE (OUTPATIENT)
Dept: FAMILY MEDICINE CLINIC | Facility: CLINIC | Age: 32
End: 2022-07-12

## 2022-07-12 NOTE — LETTER
July 12, 2022     Beauty [facility]ft  [address] [facility] [address]    Patient: Kylee Archuleta   YOB: 1990   Date of Visit: 7/12/2022       To Whom It May Concern:     Above mentioned patient is currently under our medical care  Coral had multiple medical conditions which includes stroke  Our patient has  limited mobility that prohibits her from carrying heavy objects  And due to this reason we are asking permission to allow our patient and her family to have ample time to arrange and take out  their belongings out of their house  Thank you very much in advance for your kind consideration regarding this letter  If you have any questions or concerns, feel free to contact our office        Sincerely,        Dwyane Lanes

## 2022-07-12 NOTE — TELEPHONE ENCOUNTER
Left message to call back  RE: Abilio Villareal has questions about the disability form - are we changing the accomodation request or modification? If so what are we changing?

## 2022-07-15 NOTE — TELEPHONE ENCOUNTER
Patients mother called in and states there is no accommodations or modifications being made  They are going to go to court 7/21/22 because they need to leave there home ; needs more time to move  Mother needs assistance to pack belongings and remove themselves from the property   They also need to find a place to move into

## 2022-07-18 ENCOUNTER — DOCUMENTATION (OUTPATIENT)
Dept: FAMILY MEDICINE CLINIC | Facility: CLINIC | Age: 32
End: 2022-07-18

## 2022-08-17 ENCOUNTER — TELEPHONE (OUTPATIENT)
Dept: NEUROLOGY | Facility: CLINIC | Age: 32
End: 2022-08-17

## 2022-08-17 NOTE — TELEPHONE ENCOUNTER
Called patient LVM to patient that her appointment has been cancel and rescheduled do to provider is out of office  I also mailed new appointment card to patient

## 2022-09-08 DIAGNOSIS — I63.511 ACUTE ISCHEMIC RIGHT MCA STROKE (HCC): ICD-10-CM

## 2022-09-08 DIAGNOSIS — G81.14 SPASTIC HEMIPLEGIA AFFECTING LEFT NONDOMINANT SIDE, UNSPECIFIED ETIOLOGY (HCC): ICD-10-CM

## 2022-09-08 RX ORDER — ATORVASTATIN CALCIUM 80 MG/1
TABLET, FILM COATED ORAL
Qty: 90 TABLET | Refills: 0 | Status: SHIPPED | OUTPATIENT
Start: 2022-09-08

## 2022-09-08 RX ORDER — AMLODIPINE BESYLATE 5 MG/1
TABLET ORAL
Qty: 90 TABLET | Refills: 0 | Status: SHIPPED | OUTPATIENT
Start: 2022-09-08

## 2022-09-13 ENCOUNTER — HOSPITAL ENCOUNTER (EMERGENCY)
Facility: HOSPITAL | Age: 32
Discharge: HOME | End: 2022-09-13
Attending: EMERGENCY MEDICINE
Payer: COMMERCIAL

## 2022-09-13 VITALS
RESPIRATION RATE: 16 BRPM | SYSTOLIC BLOOD PRESSURE: 135 MMHG | WEIGHT: 143.96 LBS | OXYGEN SATURATION: 100 % | HEART RATE: 83 BPM | BODY MASS INDEX: 25.5 KG/M2 | TEMPERATURE: 98.1 F | DIASTOLIC BLOOD PRESSURE: 86 MMHG

## 2022-09-13 DIAGNOSIS — H00.019 STYE: Primary | ICD-10-CM

## 2022-09-13 PROCEDURE — 99284 EMERGENCY DEPT VISIT MOD MDM: CPT

## 2022-09-13 PROCEDURE — 99282 EMERGENCY DEPT VISIT SF MDM: CPT

## 2022-09-13 NOTE — ED PROVIDER NOTES
History  Chief Complaint   Patient presents with    Eye Pain     Pt c/o eye pain in her eyelid  Pt states she noticed some redness above her eye x 2 days ago  Pt denies any other medical concerns     28 YOF presents with left eye pain and swelling x2 days  It is on the upper left eyelid  Has been trying warm compresses at home without relief  Denies drainage  Denies fever, chills, pain with EOM  Prior to Admission Medications   Prescriptions Last Dose Informant Patient Reported? Taking? Eliquis 5 MG   No No   Sig: TAKE 1 TABLET(5 MG) BY MOUTH TWICE DAILY   amLODIPine (NORVASC) 5 mg tablet   No No   Sig: TAKE 1 TABLET(5 MG) BY MOUTH DAILY   atorvastatin (LIPITOR) 80 mg tablet   No No   Sig: TAKE 1 TABLET(80 MG) BY MOUTH DAILY   famotidine (PEPCID) 20 mg tablet  Self No No   Sig: Take 1 tablet (20 mg total) by mouth 2 (two) times a day   levonorgestrel (MIRENA) 20 MCG/24HR IUD   No No   Si Intra Uterine Device by Intrauterine route once for 1 dose   traZODone (DESYREL) 50 mg tablet   No No   Sig: Take 1 tablet (50 mg total) by mouth daily at bedtime      Facility-Administered Medications: None       Past Medical History:   Diagnosis Date    Bronchitis     Embolus (HCC)     Hypertension     Stroke (cerebrum) (HCC)        Past Surgical History:   Procedure Laterality Date    TONSILLECTOMY  2013       Family History   Problem Relation Age of Onset    Hypertension Mother     Diabetes Maternal Grandfather      I have reviewed and agree with the history as documented      E-Cigarette/Vaping    E-Cigarette Use Never User      E-Cigarette/Vaping Substances    Nicotine No     THC No     CBD No     Flavoring No     Other No     Unknown No      Social History     Tobacco Use    Smoking status: Former Smoker     Quit date: 3/31/2021     Years since quittin 4    Smokeless tobacco: Never Used    Tobacco comment: 2 cigarettes per day   Vaping Use    Vaping Use: Never used   Substance Use Topics    Alcohol use: Not Currently     Comment: rarely    Drug use: No       Review of Systems   Constitutional: Negative for chills and fever  HENT: Negative for ear pain and sore throat  Eyes: Positive for pain  Negative for visual disturbance  Respiratory: Negative for cough and shortness of breath  Cardiovascular: Negative for chest pain and palpitations  Gastrointestinal: Negative for abdominal pain and vomiting  Genitourinary: Negative for dysuria and hematuria  Musculoskeletal: Negative for arthralgias and back pain  Skin: Negative for color change and rash  Neurological: Negative for seizures and syncope  All other systems reviewed and are negative  Physical Exam  Physical Exam  Vitals and nursing note reviewed  Constitutional:       General: She is not in acute distress  Appearance: Normal appearance  She is well-developed  She is not ill-appearing  HENT:      Head: Normocephalic and atraumatic  Nose: Nose normal    Eyes:      Extraocular Movements: Extraocular movements intact  Right eye: Normal extraocular motion  Left eye: Normal extraocular motion  Conjunctiva/sclera: Conjunctivae normal      Cardiovascular:      Rate and Rhythm: Normal rate and regular rhythm  Heart sounds: No murmur heard  Pulmonary:      Effort: Pulmonary effort is normal  No respiratory distress  Breath sounds: Normal breath sounds  Abdominal:      Palpations: Abdomen is soft  Tenderness: There is no abdominal tenderness  Musculoskeletal:      Cervical back: Normal range of motion and neck supple  Skin:     General: Skin is warm and dry  Capillary Refill: Capillary refill takes less than 2 seconds  Neurological:      General: No focal deficit present  Mental Status: She is alert and oriented to person, place, and time     Psychiatric:         Mood and Affect: Mood normal          Behavior: Behavior normal          Vital Signs  ED Triage Vitals [09/13/22 1123]   Temperature Pulse Respirations Blood Pressure SpO2   98 1 °F (36 7 °C) 83 16 135/86 100 %      Temp Source Heart Rate Source Patient Position - Orthostatic VS BP Location FiO2 (%)   Oral Monitor Sitting Right arm --      Pain Score       9           Vitals:    09/13/22 1123   BP: 135/86   Pulse: 83   Patient Position - Orthostatic VS: Sitting         Visual Acuity      ED Medications  Medications - No data to display    Diagnostic Studies  Results Reviewed     None                 No orders to display              Procedures  Procedures         ED Course           MDM  Number of Diagnoses or Management Options  Stye  Diagnosis management comments: 28 YOF presents with stye  Will prescribe polymycin topical and general instructions  I have discussed the plan to discharge pt from ED  The patient was discharged in stable condition   Patient ambulated off the department   Extensive return to emergency department precautions were discussed   Follow up with appropriate providers including primary care physician was discussed   Patient and/or their  primary decision maker expressed understanding  Zafar Diaz remained stable during entire emergency department stay  Amount and/or Complexity of Data Reviewed  Decide to obtain previous medical records or to obtain history from someone other than the patient: yes  Review and summarize past medical records: yes    Patient Progress  Patient progress: stable      Disposition  Final diagnoses:   Stye     Time reflects when diagnosis was documented in both MDM as applicable and the Disposition within this note     Time User Action Codes Description Comment    9/13/2022 11:59 AM Arelis Medicine Add [H34 [id number]] Stye       ED Disposition     ED Disposition   Discharge    Condition   Stable    Date/Time   Tue Sep 13, 2022 11:59 AM    Comment   [address] [facility] discharge to home/self care                 Follow-up Information     Follow up With Specialties Details Why Contact Info Additional Information    ZOE Anton Nurse Practitioner  As needed [phone]       [address] [facility] Emergency Medicine  If symptoms worsen [facility] [address]  [address] [facility] Emergency Department, [address] [place], [place], [address]          Patient's Medications   Discharge Prescriptions    BACITRACIN-POLYMYXIN B (POLYSPORIN) OPHTHALMIC OINTMENT    Administer into the left eye 2 (two) times a day       Start Date: 9/13/2022 End Date: --       Order Dose: --       Quantity: 3 5 g    Refills: 0       No discharge procedures on file      PDMP Review     None          ED Provider  Electronically Signed by           Trev Aleman PA-C  09/13/22 7149

## 2022-10-12 PROBLEM — N39.0 UTI (URINARY TRACT INFECTION): Status: RESOLVED | Noted: 2021-12-26 | Resolved: 2022-10-12

## 2022-10-12 PROBLEM — N12 PYELONEPHRITIS: Status: RESOLVED | Noted: 2021-12-26 | Resolved: 2022-10-12

## 2022-11-04 ENCOUNTER — TELEPHONE (OUTPATIENT)
Dept: NEUROLOGY | Facility: CLINIC | Age: 32
End: 2022-11-04

## 2022-11-04 NOTE — TELEPHONE ENCOUNTER
Called patient contact info to confirm  upcoming appointment  patient no longer needs appointment refused  appointment

## 2022-12-11 ENCOUNTER — HOSPITAL ENCOUNTER (EMERGENCY)
Facility: HOSPITAL | Age: 32
Discharge: HOME | End: 2022-12-11
Attending: EMERGENCY MEDICINE

## 2022-12-11 VITALS
SYSTOLIC BLOOD PRESSURE: 125 MMHG | BODY MASS INDEX: 28.63 KG/M2 | TEMPERATURE: 98 F | DIASTOLIC BLOOD PRESSURE: 85 MMHG | RESPIRATION RATE: 18 BRPM | OXYGEN SATURATION: 100 % | WEIGHT: 161.6 LBS | HEART RATE: 91 BPM

## 2022-12-11 DIAGNOSIS — K08.89 PAIN, DENTAL: Primary | ICD-10-CM

## 2022-12-11 RX ORDER — OXYCODONE HYDROCHLORIDE AND ACETAMINOPHEN 5; 325 MG/1; MG/1
1 TABLET ORAL ONCE
Status: COMPLETED | OUTPATIENT
Start: 2022-12-11 | End: 2022-12-11

## 2022-12-11 RX ORDER — AMOXICILLIN 500 MG/1
500 TABLET, FILM COATED ORAL 2 TIMES DAILY
Qty: 20 TABLET | Refills: 0 | Status: SHIPPED | OUTPATIENT
Start: 2022-12-11 | End: 2022-12-21

## 2022-12-11 RX ORDER — TRAMADOL HYDROCHLORIDE 50 MG/1
50 TABLET ORAL EVERY 6 HOURS PRN
Qty: 10 TABLET | Refills: 0 | Status: SHIPPED | OUTPATIENT
Start: 2022-12-11

## 2022-12-11 RX ADMIN — OXYCODONE AND ACETAMINOPHEN 1 TABLET: 5; 325 TABLET ORAL at 15:23

## 2022-12-11 NOTE — DISCHARGE INSTRUCTIONS
DISCHARGE INSTRUCTIONS:    FOLLOW UP WITH YOUR PRIMARY CARE PROVIDER OR THE [address] [place] [address]  MAKE AN APPOINTMENT TO BE SEEN  TAKE MEDICATION AS PRESCRIBED  IF RASH, SHORTNESS OF BREATH OR TROUBLE SWALLOWING, STOP TAKING THE MEDICATION AND BE SEEN  REST AND DRINK PLENTY OF FLUIDS  FOLLOW UP WITH YOUR DENTIST  IF SYMPTOMS WORSEN OR NEW SYMPTOMS ARISE, RETURN TO THE ER TO BE SEEN

## 2022-12-11 NOTE — ED PROVIDER NOTES
History  Chief Complaint   Patient presents with   • Dental Pain     Pt reports upper right dental pain that started five days ago  Taking tylenol and orajel without relief       32y  o female with PMH of bronchitis, embolus, HTN and stroke presents to the ER for right upper dental pain for 5 days  Patient has been taking Tylenol for pain  She describes her pain as sharp and non-radiating  Pain is constant  She does have a dentist but did not contact them yet  She denies fever, chills, URI symptoms, chest pain,dyspnea, N/V/D, abdominal pain, weakness or paresthesias  History provided by:  Patient   used: No        Prior to Admission Medications   Prescriptions Last Dose Informant Patient Reported? Taking? Eliquis 5 MG   No No   Sig: TAKE 1 TABLET(5 MG) BY MOUTH TWICE DAILY   amLODIPine (NORVASC) 5 mg tablet   No No   Sig: TAKE 1 TABLET(5 MG) BY MOUTH DAILY   atorvastatin (LIPITOR) 80 mg tablet   No No   Sig: TAKE 1 TABLET(80 MG) BY MOUTH DAILY   bacitracin-polymyxin b (POLYSPORIN) ophthalmic ointment   No No   Sig: Administer into the left eye 2 (two) times a day   famotidine (PEPCID) 20 mg tablet   No No   Sig: Take 1 tablet (20 mg total) by mouth 2 (two) times a day   levonorgestrel (MIRENA) 20 MCG/24HR IUD   No No   Si Intra Uterine Device by Intrauterine route once for 1 dose   traZODone (DESYREL) 50 mg tablet   No No   Sig: Take 1 tablet (50 mg total) by mouth daily at bedtime      Facility-Administered Medications: None       Past Medical History:   Diagnosis Date   • Bronchitis    • Embolus (HCC)    • Hypertension    • Stroke (cerebrum) [facility])        Past Surgical History:   Procedure Laterality Date   • TONSILLECTOMY  2013       Family History   Problem Relation Age of Onset   • Hypertension Mother    • Diabetes Maternal Grandfather      I have reviewed and agree with the history as documented      E-Cigarette/Vaping   • E-Cigarette Use Never User      E-Cigarette/Vaping Substances   • Nicotine No    • THC No    • CBD No    • Flavoring No    • Other No    • Unknown No      Social History     Tobacco Use   • Smoking status: Former     Types: Cigarettes     Quit date: 3/31/2021     Years since quittin 6   • Smokeless tobacco: Never   • Tobacco comments:     2 cigarettes per day   Vaping Use   • Vaping Use: Never used   Substance Use Topics   • Alcohol use: Not Currently     Comment: rarely   • Drug use: No       Review of Systems   Constitutional: Negative for activity change, appetite change, chills and fever  HENT: Positive for dental problem  Negative for congestion, drooling, ear discharge, ear pain, facial swelling, rhinorrhea and sore throat  Eyes: Negative for redness  Respiratory: Negative for cough and shortness of breath  Cardiovascular: Negative for chest pain  Gastrointestinal: Negative for abdominal pain, diarrhea, nausea and vomiting  Musculoskeletal: Negative for neck stiffness  Skin: Negative for rash  Allergic/Immunologic: Negative for food allergies  Neurological: Negative for weakness and numbness  Physical Exam  Physical Exam  Vitals and nursing note reviewed  Constitutional:       General: She is not in acute distress  Appearance: She is not toxic-appearing  HENT:      Head: Normocephalic and atraumatic  Mouth/Throat:      Lips: Pink  Mouth: Mucous membranes are moist       Dentition: Abnormal dentition  Dental tenderness and gingival swelling present  No dental abscesses  Pharynx: Oropharynx is clear  Uvula midline  No pharyngeal swelling, oropharyngeal exudate, posterior oropharyngeal erythema or uvula swelling  Tonsils: No tonsillar exudate or tonsillar abscesses  Eyes:      Conjunctiva/sclera: Conjunctivae normal    Neck:      Trachea: No tracheal deviation  Cardiovascular:      Rate and Rhythm: Normal rate  Pulmonary:      Effort: Pulmonary effort is normal  No respiratory distress  Abdominal:      General: There is no distension  Musculoskeletal:      Cervical back: Normal range of motion and neck supple  Skin:     General: Skin is warm and dry  Findings: No rash  Neurological:      Mental Status: She is alert  GCS: GCS eye subscore is 4  GCS verbal subscore is 5  GCS motor subscore is 6  Psychiatric:         Mood and Affect: Mood normal          Vital Signs  ED Triage Vitals [12/11/22 1440]   Temperature Pulse Respirations Blood Pressure SpO2   98 °F (36 7 °C) 91 18 125/85 100 %      Temp Source Heart Rate Source Patient Position - Orthostatic VS BP Location FiO2 (%)   Oral Monitor -- -- --      Pain Score       --           Vitals:    12/11/22 1440   BP: 125/85   Pulse: 91         Visual Acuity      ED Medications  Medications   oxyCODONE-acetaminophen (PERCOCET) 5-325 mg per tablet 1 tablet (1 tablet Oral Given 12/11/22 1523)       Diagnostic Studies  Results Reviewed     None                 No orders to display              Procedures  Procedures         ED Course                               SBIRT 22yo+    Flowsheet Row Most Recent Value   SBIRT (23 yo +)    In order to provide better care to our patients, we are screening all of our patients for alcohol and drug use  Would it be okay to ask you these screening questions? No Filed at: 12/11/2022 1458                    MDM  Number of Diagnoses or Management Options  Pain, dental: new and does not require workup  Diagnosis management comments:     32y  o female presents to the ER for right upper dental pain for 5 days  Vitals stable  Patient in no acute distress  On exam, mild swelling of the right upper gum seen  Teeth are broken  No discernable abscess seen  Area is tender to palpation  No floor of the mouth tenderness to palpation or swelling  No trismus  No signs of infection in the throat  Will discharge with medication and follow up  Patient agreeable      The management plan was discussed in detail with the patient at bedside and all questions were answered  Prior to discharge, we provided both verbal and written instructions  We discussed with the patient the signs and symptoms for which to return to the emergency department  All questions were answered and patient was comfortable with the plan of care and discharged to home  Instructed the patient to follow up with the primary care provider and/or specialist provided and their written instructions  The patient verbalized understanding of our discussion and plan of care, and agrees to return to the Emergency Department for concerns and progression of illness  At discharge, I instructed the patient to:  -follow up with pcp  -take Amoxicillin and Tramadol as prescribed  -rest and drink plenty of fluids  -follow up with your dentist  -return to the ER if symptoms worsened or new symptoms arose  Patient agreed to this plan and was stable at time of discharge  Patient Progress  Patient progress: stable      Disposition  Final diagnoses:   Pain, dental     Time reflects when diagnosis was documented in both MDM as applicable and the Disposition within this note     Time User Action Codes Description Comment    12/11/2022  3:16 PM Pastor Julisa Santiago [K08 [id number]] Pain, dental       ED Disposition     ED Disposition   Discharge    Condition   Stable    Date/Time   Sun Dec 11, 2022  3:16 PM    [address] [place] [address] Drive discharge to home/self care                 Follow-up Information     Follow up With Specialties Details Why TRUPTI Virk 70, CRNP Nurse Practitioner Schedule an appointment as soon as possible for a visit  As needed [address]  [phone]            Discharge Medication List as of 12/11/2022  3:19 PM      START taking these medications    Details   amoxicillin (AMOXIL) 500 MG tablet Take 1 tablet (500 mg total) by mouth 2 (two) times a day for 10 days, Starting Sun 12/11/2022, Until Wed 12/21/2022, Normal traMADol (ULTRAM) 50 mg tablet Take 1 tablet (50 mg total) by mouth every 6 (six) hours as needed for moderate pain, Starting Sun 12/11/2022, Normal         CONTINUE these medications which have NOT CHANGED    Details   traZODone (DESYREL) 50 mg tablet Take 1 tablet (50 mg total) by mouth daily at bedtime, Starting Mon 7/11/2022, Normal      amLODIPine (NORVASC) 5 mg tablet TAKE 1 TABLET(5 MG) BY MOUTH DAILY, Normal      atorvastatin (LIPITOR) 80 mg tablet TAKE 1 TABLET(80 MG) BY MOUTH DAILY, Normal      bacitracin-polymyxin b (POLYSPORIN) ophthalmic ointment Administer into the left eye 2 (two) times a day, Starting Tue 9/13/2022, Normal      Eliquis 5 MG TAKE 1 TABLET(5 MG) BY MOUTH TWICE DAILY, Normal      famotidine (PEPCID) 20 mg tablet Take 1 tablet (20 mg total) by mouth 2 (two) times a day, Starting Mon 6/7/2021, Normal      levonorgestrel (MIRENA) 20 MCG/24HR IUD 1 Intra Uterine Device by Intrauterine route once for 1 dose, Starting Wed 12/30/2020, Normal             No discharge procedures on file      PDMP Review       Value Time User    PDMP Reviewed  Yes 12/11/2022  3:16 PM Kady Kim PA-C          ED Provider  Electronically Signed by           Kady Kim PA-C  12/11/22 5406

## 2023-03-09 ENCOUNTER — OFFICE VISIT (OUTPATIENT)
Dept: FAMILY MEDICINE CLINIC | Facility: CLINIC | Age: 33
End: 2023-03-09

## 2023-03-09 VITALS
DIASTOLIC BLOOD PRESSURE: 76 MMHG | HEART RATE: 90 BPM | OXYGEN SATURATION: 99 % | SYSTOLIC BLOOD PRESSURE: 122 MMHG | BODY MASS INDEX: 27.5 KG/M2 | WEIGHT: 155.2 LBS | HEIGHT: 63 IN | TEMPERATURE: 98.2 F

## 2023-03-09 DIAGNOSIS — F41.9 ANXIETY DISORDER, UNSPECIFIED TYPE: ICD-10-CM

## 2023-03-09 DIAGNOSIS — E55.9 VITAMIN D DEFICIENCY: ICD-10-CM

## 2023-03-09 DIAGNOSIS — Z00.00 ANNUAL PHYSICAL EXAM: Primary | ICD-10-CM

## 2023-03-09 DIAGNOSIS — I63.81 STROKE OF RIGHT BASAL GANGLIA (HCC): ICD-10-CM

## 2023-03-09 DIAGNOSIS — I63.511 ACUTE ISCHEMIC RIGHT MCA STROKE (HCC): ICD-10-CM

## 2023-03-09 DIAGNOSIS — Z11.4 SCREENING FOR HIV (HUMAN IMMUNODEFICIENCY VIRUS): ICD-10-CM

## 2023-03-09 DIAGNOSIS — Z11.59 ENCOUNTER FOR HEPATITIS C SCREENING TEST FOR LOW RISK PATIENT: ICD-10-CM

## 2023-03-09 RX ORDER — SERTRALINE HYDROCHLORIDE 25 MG/1
25 TABLET, FILM COATED ORAL DAILY
Qty: 30 TABLET | Refills: 1 | Status: SHIPPED | OUTPATIENT
Start: 2023-03-09 | End: 2023-03-16 | Stop reason: SDUPTHER

## 2023-03-09 NOTE — PROGRESS NOTES
Luis Stock is a 28 y o   female and is here for routine health maintenance  The patient reports no problems  History of Present Illness     Patient presents today for physical examination and anxiety  Patient states that she has been feeling anxious lately and is not currently taking any medications  ROSAMARIA=15  Options for treatment, including therapy and medications, were discussed at this time  Well Adult Physical   Patient here for a comprehensive physical exam       Diet and Physical Activity  Diet: well balanced diet  Weight concerns: Patient is overweight (BMI 25 0-29  9)  Exercise: infrequently      Depression Screen  PHQ-2/9 Depression Screening    Little interest or pleasure in doing things: 0 - not at all  Feeling down, depressed, or hopeless: 0 - not at all  PHQ-2 Score: 0  PHQ-2 Interpretation: Negative depression screen          General Health  Hearing: Normal:  bilateral  Vision: no vision problems, goes for regular eye exams and most recent eye exam <1 year  Dental: regular dental visits, brushes teeth twice daily and flosses teeth occasionally     History:  LMP: No LMP recorded  Menopause at N/A  : 0  Para: 0    Cancer Screening  Colononoscopy N/A  Mammogram N/A   Pap unavailable  Abnormal pap? no  Smoker YES  Annual screening with low-dose helical computed tomography (CT) for patients age 54 to 76 years with history of smoking at least 30 pack-years and, if a former smoker, had quit within the previous 15 years        The following portions of the patient's history were reviewed and updated as appropriate: allergies, current medications, past family history, past medical history, past social history, past surgical history and problem list     Review of Systems     Review of Systems   Constitutional: Negative for chills, fatigue and fever  HENT: Negative for congestion, ear pain, postnasal drip, rhinorrhea, sinus pressure and sore throat      Eyes: Negative for pain and visual disturbance  Respiratory: Negative for cough, shortness of breath and wheezing  Cardiovascular: Negative for chest pain, palpitations and leg swelling  Gastrointestinal: Negative for abdominal pain, blood in stool, constipation, diarrhea, nausea and vomiting  Endocrine: Negative for cold intolerance, heat intolerance, polydipsia, polyphagia and polyuria  Genitourinary: Negative for dysuria, frequency, hematuria, menstrual problem, pelvic pain, urgency and vaginal discharge  Musculoskeletal: Negative for arthralgias, back pain and gait problem  Skin: Negative for rash  Allergic/Immunologic: Negative for environmental allergies and food allergies  Neurological: Negative for dizziness and headaches  Hematological: Does not bruise/bleed easily  Psychiatric/Behavioral: Negative for dysphoric mood  The patient is not nervous/anxious          Past Medical History     Past Medical History:   Diagnosis Date   • Bronchitis    • Embolus ([address] )    • Hypertension    • Stroke (cerebrum) [facility])        Past Surgical History     Past Surgical History:   Procedure Laterality Date   • TONSILLECTOMY  2013       Social History     Social History     Socioeconomic History   • Marital status: Single     Spouse name: None   • Number of children: None   • Years of education: None   • Highest education level: None   Occupational History   • None   Tobacco Use   • Smoking status: Former     Types: Cigarettes     Quit date: 3/31/2021     Years since quittin 9   • Smokeless tobacco: Never   • Tobacco comments:     2 cigarettes per day   Vaping Use   • Vaping Use: Never used   Substance and Sexual Activity   • Alcohol use: Not Currently     Comment: rarely   • Drug use: No   • Sexual activity: Not Currently   Other Topics Concern   • None   Social History Narrative   • None     Social Determinants of Health     Financial Resource Strain: Not on file   Food Insecurity: Not on file   Transportation Needs: Not on file   Physical Activity: Not on file   Stress: Not on file   Social Connections: Not on file   Intimate Partner Violence: Not on file   Housing Stability: Not on file       Family History     Family History   Problem Relation Age of Onset   • Hypertension Mother    • Diabetes Maternal Grandfather        Current Medications       Current Outpatient Medications:   •  amLODIPine (NORVASC) 5 mg tablet, TAKE 1 TABLET(5 MG) BY MOUTH DAILY, Disp: 90 tablet, Rfl: 0  •  atorvastatin (LIPITOR) 80 mg tablet, TAKE 1 TABLET(80 MG) BY MOUTH DAILY, Disp: 90 tablet, Rfl: 0  •  Eliquis 5 MG, TAKE 1 TABLET(5 MG) BY MOUTH TWICE DAILY, Disp: 180 tablet, Rfl: 0  •  famotidine (PEPCID) 20 mg tablet, Take 1 tablet (20 mg total) by mouth 2 (two) times a day, Disp: 180 tablet, Rfl: 0  •  sertraline (Zoloft) 25 mg tablet, Take 1 tablet (25 mg total) by mouth daily, Disp: 30 tablet, Rfl: 1  •  traMADol (ULTRAM) 50 mg tablet, Take 1 tablet (50 mg total) by mouth every 6 (six) hours as needed for moderate pain, Disp: 10 tablet, Rfl: 0  •  traZODone (DESYREL) 50 mg tablet, Take 1 tablet (50 mg total) by mouth daily at bedtime, Disp: 30 tablet, Rfl: 2  •  bacitracin-polymyxin b (POLYSPORIN) ophthalmic ointment, Administer into the left eye 2 (two) times a day, Disp: 3 5 g, Rfl: 0  •  levonorgestrel (MIRENA) 20 MCG/24HR IUD, 1 Intra Uterine Device by Intrauterine route once for 1 dose, Disp: 1 each, Rfl: 0     Allergies     Allergies   Allergen Reactions   • Azithromycin Anaphylaxis       Objective     /76 (BP Location: Left arm, Patient Position: Sitting, Cuff Size: Adult)   Pulse 90   Temp 98 2 °F (36 8 °C) (Temporal)   Ht 5' 2 75" (1 594 m)   Wt 70 4 kg (155 lb 3 2 oz)   SpO2 99%   BMI 27 71 kg/m²   Wt Readings from Last 3 Encounters:   03/09/23 70 4 kg (155 lb 3 2 oz)   12/11/22 73 3 kg (161 lb 9 6 oz)   09/13/22 65 3 kg (143 lb 15 4 oz)     BP Readings from Last 3 Encounters:   03/09/23 122/76   12/11/22 125/85   09/13/22 135/86     Pulse Readings from Last 3 Encounters:   03/09/23 90   12/11/22 91   09/13/22 83     Body mass index is 27 71 kg/m²  Physical Exam  Vitals reviewed  Constitutional:       Appearance: Normal appearance  She is well-developed  HENT:      Head: Normocephalic  Right Ear: Hearing, tympanic membrane and ear canal normal       Left Ear: Hearing, tympanic membrane and ear canal normal       Nose: Nose normal       Mouth/Throat:      Mouth: Mucous membranes are moist       Pharynx: Oropharynx is clear  Eyes:      General:         Right eye: No discharge  Left eye: No discharge  Conjunctiva/sclera: Conjunctivae normal       Pupils: Pupils are equal, round, and reactive to light  Neck:      Thyroid: No thyromegaly  Cardiovascular:      Rate and Rhythm: Normal rate and regular rhythm  Pulses: Normal pulses  Heart sounds: Normal heart sounds  Pulmonary:      Effort: Pulmonary effort is normal       Breath sounds: Normal breath sounds  Abdominal:      General: Abdomen is flat  Bowel sounds are normal  There is no distension  Palpations: Abdomen is soft  There is no hepatomegaly, splenomegaly or mass  Tenderness: There is no abdominal tenderness  There is no right CVA tenderness, left CVA tenderness, guarding or rebound  Hernia: No hernia is present  Musculoskeletal:         General: Normal range of motion  Cervical back: Normal range of motion and neck supple  Lymphadenopathy:      Cervical: No cervical adenopathy  Skin:     General: Skin is warm and dry  Capillary Refill: Capillary refill takes less than 2 seconds  Neurological:      General: No focal deficit present  Mental Status: She is alert and oriented to person, place, and time  Psychiatric:         Mood and Affect: Mood normal          Behavior: Behavior normal            No results found      Health Maintenance     Health Maintenance   Topic Date Due   • Hepatitis C Screening Never done   • HIV Screening  Never done   • Cervical Cancer Screening  Never done   • COVID-19 Vaccine (3 - Booster for Moderna series) 11/23/2021   • Influenza Vaccine (1) 09/01/2022   • Depression Screening  03/09/2024   • BMI: Followup Plan  03/09/2024   • BMI: Adult  03/09/2024   • Annual Physical  03/09/2024   • DTaP,Tdap,and Td Vaccines (6 - Td or Tdap) 08/09/2028   • HIB Vaccine  Completed   • IPV Vaccine  Completed   • Meningococcal ACWY Vaccine  Completed   • Pneumococcal Vaccine: Pediatrics (0 to 5 Years) and At-Risk Patients (6 to 59 Years)  Aged Out   • Hepatitis A Vaccine  Aged Out   • HPV Vaccine  Aged [employer] History   Administered Date(s) Administered   • COVID-19 MODERNA VACC 0 5 ML IM 08/31/2021, 09/28/2021   • DT (pediatric) 1990, 01/05/2001   • DTaP 1990, 1990, 09/04/1991, 04/28/1995   • Hep B, Adolescent or Pediatric 05/04/1993   • Hep B, adult 06/03/1993, 08/12/1999   • HiB 02/04/1991, 04/25/1991, 06/27/1991, 09/04/1991   • IPV 1990, 1990, 09/04/1991, 04/28/1995   • Influenza, injectable, quadrivalent, preservative free 0 5 mL 12/11/2020   • MMR 06/27/1991, 08/12/1999   • Meningococcal Conjugate (MCV4O) 06/08/2007   • Meningococcal MCV4P 06/08/2007   • Tdap 08/09/2018   • Varicella 08/12/1991         Laboratory Results:   Lab Results   Component Value Date    WBC 10 95 (H) 12/26/2021    WBC 14 41 (H) 11/05/2020    WBC 12 33 (H) 10/07/2020    HGB 11 5 12/26/2021    HGB 12 8 11/05/2020    HGB 12 7 10/07/2020    HCT 35 0 12/26/2021    HCT 40 7 11/05/2020    HCT 39 4 10/07/2020    MCV 83 12/26/2021    MCV 92 11/05/2020    MCV 93 10/07/2020     12/26/2021     (H) 11/05/2020     10/07/2020     Lab Results   Component Value Date    BUN 6 12/26/2021    BUN 8 11/05/2020    BUN 11 10/07/2020     Lab Results   Component Value Date    GLUC 99 12/26/2021    GLUC 91 11/05/2020    GLUC 78 10/07/2020    ALT 12 12/26/2021    ALT 18 11/05/2020 ALT 35 10/07/2020    AST 11 (L) 12/26/2021    AST 14 11/05/2020    AST 12 10/07/2020     No results found for: TSH  No results found for: A1C    Lipid Profile:   No results found for: CHOL  No results found for: HDL  No results found for: LDLC  No results found for: LDLCALC  No results found for: TRIG              Assessment/Plan     1  Annual physical exam  -     CBC and differential; Future  -     Comprehensive metabolic panel; Future    2  Stroke of right basal ganglia (HCC)  -     Lipid panel; Future    3  Acute ischemic right MCA stroke ([facility] [address] )  -     Lipid panel; Future    4  Anxiety disorder, unspecified type  -     sertraline (Zoloft) 25 mg tablet; Take 1 tablet (25 mg total) by mouth daily  -     Ambulatory Referral to Psychology; Future    5  BMI 27 0-27 9,adult  -     TSH, 3rd generation with Free T4 reflex; Future    6  Vitamin D deficiency  -     Vitamin D 25 hydroxy; Future    7  Screening for HIV (human immunodeficiency virus)  -     HIV 1/2 AG/AB w Reflex [facility] for 2 yr old and above; Future    8  Encounter for hepatitis C screening test for low risk patient  -     Hepatitis C antibody; Future          1  Healthy female exam   2  Patient Counseling:   · Nutrition: Stressed importance of a well balanced diet, moderation of sodium/saturated fat, caloric balance and sufficient intake of fiber  · Exercise: Stressed the importance of regular exercise with a goal of 150 minutes per week  · Dental Health: Discussed daily flossing and brushing and regular dental visits     · Immunizations reviewed  yes  · Discussed benefits of screening yes  · Discussed the patient's BMI with her  The BMI is above average; BMI management plan is completed  3  Cancer Screening done  4  Labs ordered  5  Follow up next physical in 1 year  ZOE Magallon       BMI Counseling: Body mass index is 27 71 kg/m²   The BMI is above normal  Nutrition recommendations include 3-5 servings of fruits/vegetables daily, moderation in carbohydrate intake, increasing intake of lean protein, reducing intake of saturated fat and trans fat and reducing intake of cholesterol  Exercise recommendations include exercising 3-5 times per week

## 2023-03-16 DIAGNOSIS — F41.9 ANXIETY DISORDER, UNSPECIFIED TYPE: ICD-10-CM

## 2023-03-16 RX ORDER — SERTRALINE HYDROCHLORIDE 25 MG/1
25 TABLET, FILM COATED ORAL DAILY
Qty: 30 TABLET | Refills: 0 | Status: SHIPPED | OUTPATIENT
Start: 2023-03-16

## 2023-04-01 ENCOUNTER — APPOINTMENT (OUTPATIENT)
Dept: LAB | Facility: HOSPITAL | Age: 33
End: 2023-04-01

## 2023-04-01 DIAGNOSIS — I63.511 ACUTE ISCHEMIC RIGHT MCA STROKE (HCC): ICD-10-CM

## 2023-04-01 DIAGNOSIS — Z11.4 SCREENING FOR HIV (HUMAN IMMUNODEFICIENCY VIRUS): ICD-10-CM

## 2023-04-01 DIAGNOSIS — Z00.00 ANNUAL PHYSICAL EXAM: ICD-10-CM

## 2023-04-01 DIAGNOSIS — Z11.59 ENCOUNTER FOR HEPATITIS C SCREENING TEST FOR LOW RISK PATIENT: ICD-10-CM

## 2023-04-01 DIAGNOSIS — I63.81 STROKE OF RIGHT BASAL GANGLIA (HCC): ICD-10-CM

## 2023-04-01 DIAGNOSIS — E55.9 VITAMIN D DEFICIENCY: ICD-10-CM

## 2023-04-01 LAB
25(OH)D3 SERPL-MCNC: 12 NG/ML (ref 30–100)
ALBUMIN SERPL BCP-MCNC: 4.3 G/DL (ref 3.5–5)
ALP SERPL-CCNC: 104 U/L (ref 34–104)
ALT SERPL W P-5'-P-CCNC: 14 U/L (ref 7–52)
ANION GAP SERPL CALCULATED.3IONS-SCNC: 5 MMOL/L (ref 4–13)
AST SERPL W P-5'-P-CCNC: 13 U/L (ref 13–39)
BASOPHILS # BLD AUTO: 0.05 THOUSANDS/ÂΜL (ref 0–0.1)
BASOPHILS NFR BLD AUTO: 1 % (ref 0–1)
BILIRUB SERPL-MCNC: 0.69 MG/DL (ref 0.2–1)
BUN SERPL-MCNC: 6 MG/DL (ref 5–25)
CALCIUM SERPL-MCNC: 9.3 MG/DL (ref 8.4–10.2)
CHLORIDE SERPL-SCNC: 102 MMOL/L (ref 96–108)
CHOLEST SERPL-MCNC: 181 MG/DL
CO2 SERPL-SCNC: 29 MMOL/L (ref 21–32)
CREAT SERPL-MCNC: 0.78 MG/DL (ref 0.6–1.3)
EOSINOPHIL # BLD AUTO: 0.24 THOUSAND/ÂΜL (ref 0–0.61)
EOSINOPHIL NFR BLD AUTO: 3 % (ref 0–6)
ERYTHROCYTE [DISTWIDTH] IN BLOOD BY AUTOMATED COUNT: 14.1 % (ref 11.6–15.1)
GFR SERPL CREATININE-BSD FRML MDRD: 100 ML/MIN/1.73SQ M
GLUCOSE P FAST SERPL-MCNC: 84 MG/DL (ref 65–99)
HCT VFR BLD AUTO: 44.6 % (ref 34.8–46.1)
HDLC SERPL-MCNC: 40 MG/DL
HGB BLD-MCNC: 13.9 G/DL (ref 11.5–15.4)
IMM GRANULOCYTES # BLD AUTO: 0.04 THOUSAND/UL (ref 0–0.2)
IMM GRANULOCYTES NFR BLD AUTO: 1 % (ref 0–2)
LDLC SERPL CALC-MCNC: 121 MG/DL (ref 0–100)
LYMPHOCYTES # BLD AUTO: 2.96 THOUSANDS/ÂΜL (ref 0.6–4.47)
LYMPHOCYTES NFR BLD AUTO: 38 % (ref 14–44)
MCH RBC QN AUTO: 28.1 PG (ref 26.8–34.3)
MCHC RBC AUTO-ENTMCNC: 31.2 G/DL (ref 31.4–37.4)
MCV RBC AUTO: 90 FL (ref 82–98)
MONOCYTES # BLD AUTO: 0.64 THOUSAND/ÂΜL (ref 0.17–1.22)
MONOCYTES NFR BLD AUTO: 8 % (ref 4–12)
NEUTROPHILS # BLD AUTO: 3.79 THOUSANDS/ÂΜL (ref 1.85–7.62)
NEUTS SEG NFR BLD AUTO: 49 % (ref 43–75)
NONHDLC SERPL-MCNC: 141 MG/DL
NRBC BLD AUTO-RTO: 0 /100 WBCS
PLATELET # BLD AUTO: 382 THOUSANDS/UL (ref 149–390)
PMV BLD AUTO: 9.1 FL (ref 8.9–12.7)
POTASSIUM SERPL-SCNC: 3.7 MMOL/L (ref 3.5–5.3)
PROT SERPL-MCNC: 7.4 G/DL (ref 6.4–8.4)
RBC # BLD AUTO: 4.95 MILLION/UL (ref 3.81–5.12)
SODIUM SERPL-SCNC: 136 MMOL/L (ref 135–147)
TRIGL SERPL-MCNC: 100 MG/DL
TSH SERPL DL<=0.05 MIU/L-ACNC: 1.79 UIU/ML (ref 0.45–4.5)
WBC # BLD AUTO: 7.72 THOUSAND/UL (ref 4.31–10.16)

## 2023-04-03 LAB
HCV AB SER QL: NORMAL
HIV 1+2 AB+HIV1 P24 AG SERPL QL IA: NORMAL
HIV 2 AB SERPL QL IA: NORMAL
HIV1 AB SERPL QL IA: NORMAL
HIV1 P24 AG SERPL QL IA: NORMAL

## 2023-10-06 ENCOUNTER — HOSPITAL ENCOUNTER (EMERGENCY)
Facility: HOSPITAL | Age: 33
Discharge: HOME | End: 2023-10-06
Attending: EMERGENCY MEDICINE
Payer: MEDICARE

## 2023-10-06 VITALS
OXYGEN SATURATION: 99 % | TEMPERATURE: 97.5 F | RESPIRATION RATE: 16 BRPM | DIASTOLIC BLOOD PRESSURE: 85 MMHG | SYSTOLIC BLOOD PRESSURE: 129 MMHG | HEART RATE: 87 BPM | BODY MASS INDEX: 27.91 KG/M2 | WEIGHT: 156.31 LBS

## 2023-10-06 DIAGNOSIS — R11.2 NAUSEA AND VOMITING: Primary | ICD-10-CM

## 2023-10-06 LAB
ALBUMIN SERPL BCP-MCNC: 4.3 G/DL (ref 3.5–5)
ALP SERPL-CCNC: 97 U/L (ref 34–104)
ALT SERPL W P-5'-P-CCNC: 9 U/L (ref 7–52)
ANION GAP SERPL CALCULATED.3IONS-SCNC: 6 MMOL/L
AST SERPL W P-5'-P-CCNC: 12 U/L (ref 13–39)
BASOPHILS # BLD AUTO: 0.03 THOUSANDS/ÂΜL (ref 0–0.1)
BASOPHILS NFR BLD AUTO: 0 % (ref 0–1)
BILIRUB SERPL-MCNC: 0.8 MG/DL (ref 0.2–1)
BUN SERPL-MCNC: 11 MG/DL (ref 5–25)
CALCIUM SERPL-MCNC: 9.1 MG/DL (ref 8.4–10.2)
CHLORIDE SERPL-SCNC: 105 MMOL/L (ref 96–108)
CO2 SERPL-SCNC: 27 MMOL/L (ref 21–32)
CREAT SERPL-MCNC: 0.74 MG/DL (ref 0.6–1.3)
EOSINOPHIL # BLD AUTO: 0.15 THOUSAND/ÂΜL (ref 0–0.61)
EOSINOPHIL NFR BLD AUTO: 2 % (ref 0–6)
ERYTHROCYTE [DISTWIDTH] IN BLOOD BY AUTOMATED COUNT: 13.7 % (ref 11.6–15.1)
EXT PREGNANCY TEST URINE: NEGATIVE
EXT. CONTROL: NORMAL
GFR SERPL CREATININE-BSD FRML MDRD: 106 ML/MIN/1.73SQ M
GLUCOSE SERPL-MCNC: 79 MG/DL (ref 65–140)
HCT VFR BLD AUTO: 45.7 % (ref 34.8–46.1)
HGB BLD-MCNC: 14.7 G/DL (ref 11.5–15.4)
IMM GRANULOCYTES # BLD AUTO: 0.04 THOUSAND/UL (ref 0–0.2)
IMM GRANULOCYTES NFR BLD AUTO: 0 % (ref 0–2)
LYMPHOCYTES # BLD AUTO: 1.69 THOUSANDS/ÂΜL (ref 0.6–4.47)
LYMPHOCYTES NFR BLD AUTO: 16 % (ref 14–44)
MCH RBC QN AUTO: 28.8 PG (ref 26.8–34.3)
MCHC RBC AUTO-ENTMCNC: 32.2 G/DL (ref 31.4–37.4)
MCV RBC AUTO: 89 FL (ref 82–98)
MONOCYTES # BLD AUTO: 0.67 THOUSAND/ÂΜL (ref 0.17–1.22)
MONOCYTES NFR BLD AUTO: 7 % (ref 4–12)
NEUTROPHILS # BLD AUTO: 7.73 THOUSANDS/ÂΜL (ref 1.85–7.62)
NEUTS SEG NFR BLD AUTO: 75 % (ref 43–75)
NRBC BLD AUTO-RTO: 0 /100 WBCS
PLATELET # BLD AUTO: 350 THOUSANDS/UL (ref 149–390)
PMV BLD AUTO: 8.8 FL (ref 8.9–12.7)
POTASSIUM SERPL-SCNC: 3.7 MMOL/L (ref 3.5–5.3)
PROT SERPL-MCNC: 7.4 G/DL (ref 6.4–8.4)
RBC # BLD AUTO: 5.11 MILLION/UL (ref 3.81–5.12)
SODIUM SERPL-SCNC: 138 MMOL/L (ref 135–147)
WBC # BLD AUTO: 10.31 THOUSAND/UL (ref 4.31–10.16)

## 2023-10-06 PROCEDURE — 85025 COMPLETE CBC W/AUTO DIFF WBC: CPT

## 2023-10-06 PROCEDURE — 96361 HYDRATE IV INFUSION ADD-ON: CPT

## 2023-10-06 PROCEDURE — 99283 EMERGENCY DEPT VISIT LOW MDM: CPT

## 2023-10-06 PROCEDURE — 80053 COMPREHEN METABOLIC PANEL: CPT

## 2023-10-06 PROCEDURE — 81025 URINE PREGNANCY TEST: CPT

## 2023-10-06 PROCEDURE — 96374 THER/PROPH/DIAG INJ IV PUSH: CPT

## 2023-10-06 PROCEDURE — 99284 EMERGENCY DEPT VISIT MOD MDM: CPT

## 2023-10-06 PROCEDURE — 36415 COLL VENOUS BLD VENIPUNCTURE: CPT

## 2023-10-06 RX ORDER — ONDANSETRON 4 MG/1
4 TABLET, ORALLY DISINTEGRATING ORAL EVERY 6 HOURS PRN
Qty: 20 TABLET | Refills: 0 | Status: SHIPPED | OUTPATIENT
Start: 2023-10-06

## 2023-10-06 RX ORDER — ONDANSETRON 2 MG/ML
4 INJECTION INTRAMUSCULAR; INTRAVENOUS ONCE
Status: COMPLETED | OUTPATIENT
Start: 2023-10-06 | End: 2023-10-06

## 2023-10-06 RX ADMIN — SODIUM CHLORIDE 1000 ML: 0.9 INJECTION, SOLUTION INTRAVENOUS at 16:56

## 2023-10-06 RX ADMIN — ONDANSETRON 4 MG: 2 INJECTION INTRAMUSCULAR; INTRAVENOUS at 16:57

## 2023-10-06 NOTE — ED PROVIDER NOTES
History  Chief Complaint   Patient presents with   • Vomiting     Pt reports multiple episodes vomiting daily x3 days. Denies diarrhea, constipation and abdominal pain. No urinary symptoms noted. Denies sick exposure     35 YOF with PMH HTN, stroke, bronchitis presents today with nausea and vomiting x2-3 days. Pt states her last episode of vomiting was 2 hours PTA. Denies it being bloody. Has not tried any medication at home. Denies fever, chills, chest pain, SOB, abd pain, diarrhea. Pt reports there is a chance of pregnancy. Prior to Admission Medications   Prescriptions Last Dose Informant Patient Reported? Taking?    Eliquis 5 MG Not Taking  No No   Sig: TAKE 1 TABLET(5 MG) BY MOUTH TWICE DAILY   Patient not taking: Reported on 10/6/2023   amLODIPine (NORVASC) 5 mg tablet Not Taking  No No   Sig: TAKE 1 TABLET(5 MG) BY MOUTH DAILY   Patient not taking: Reported on 10/6/2023   atorvastatin (LIPITOR) 80 mg tablet Not Taking  No No   Sig: TAKE 1 TABLET(80 MG) BY MOUTH DAILY   Patient not taking: Reported on 10/6/2023   bacitracin-polymyxin b (POLYSPORIN) ophthalmic ointment   No No   Sig: Administer into the left eye 2 (two) times a day   famotidine (PEPCID) 20 mg tablet Not Taking Self No No   Sig: Take 1 tablet (20 mg total) by mouth 2 (two) times a day   Patient not taking: Reported on 10/6/2023   levonorgestrel (MIRENA) 20 MCG/24HR IUD   No No   Si Intra Uterine Device by Intrauterine route once for 1 dose   sertraline (Zoloft) 25 mg tablet Not Taking  No No   Sig: Take 1 tablet (25 mg total) by mouth daily   Patient not taking: Reported on 10/6/2023   traMADol (ULTRAM) 50 mg tablet Not Taking  No No   Sig: Take 1 tablet (50 mg total) by mouth every 6 (six) hours as needed for moderate pain   Patient not taking: Reported on 10/6/2023   traZODone (DESYREL) 50 mg tablet Not Taking  No No   Sig: Take 1 tablet (50 mg total) by mouth daily at bedtime   Patient not taking: Reported on 10/6/2023 Facility-Administered Medications: None       Past Medical History:   Diagnosis Date   • Bronchitis    • Embolus ([address])    • Hypertension    • Stroke (cerebrum) [facility])        Past Surgical History:   Procedure Laterality Date   • TONSILLECTOMY  2013       Family History   Problem Relation Age of Onset   • Hypertension Mother    • Diabetes Maternal Grandfather      I have reviewed and agree with the history as documented. E-Cigarette/Vaping   • E-Cigarette Use Never User      E-Cigarette/Vaping Substances   • Nicotine No    • THC No    • CBD No    • Flavoring No    • Other No    • Unknown No      Social History     Tobacco Use   • Smoking status: Former     Types: Cigarettes     Quit date: 3/31/2021     Years since quittin.5   • Smokeless tobacco: Never   • Tobacco comments:     2 cigarettes per day   Vaping Use   • Vaping Use: Never used   Substance Use Topics   • Alcohol use: Not Currently     Comment: rarely   • Drug use: No       Review of Systems   Constitutional: Negative for chills and fever. Respiratory: Negative for shortness of breath. Cardiovascular: Negative for chest pain. Gastrointestinal: Positive for nausea and vomiting. Negative for abdominal pain and diarrhea. All other systems reviewed and are negative. Physical Exam  Physical Exam  Vitals and nursing note reviewed. Constitutional:       General: She is not in acute distress. Appearance: Normal appearance. She is well-developed. She is not ill-appearing. HENT:      Head: Normocephalic and atraumatic. Eyes:      Conjunctiva/sclera: Conjunctivae normal.   Cardiovascular:      Rate and Rhythm: Normal rate. Pulmonary:      Effort: Pulmonary effort is normal.   Abdominal:      Palpations: Abdomen is soft. Tenderness: There is no abdominal tenderness. There is no guarding. Musculoskeletal:         General: No swelling. Normal range of motion. Cervical back: Normal range of motion and neck supple. Skin:     General: Skin is warm and dry. Capillary Refill: Capillary refill takes less than 2 seconds. Neurological:      Mental Status: She is alert.    Psychiatric:         Mood and Affect: Mood normal.         Behavior: Behavior normal.         Vital Signs  ED Triage Vitals [10/06/23 1549]   Temperature Pulse Respirations Blood Pressure SpO2   97.5 °F (36.4 °C) 87 16 129/85 99 %      Temp Source Heart Rate Source Patient Position - Orthostatic VS BP Location FiO2 (%)   Oral Monitor Sitting Right arm --      Pain Score       No Pain           Vitals:    10/06/23 1549   BP: 129/85   Pulse: 87   Patient Position - Orthostatic VS: Sitting         Visual Acuity      ED Medications  Medications   sodium chloride 0.9 % bolus 1,000 mL (0 mL Intravenous Stopped 10/6/23 1825)   ondansetron (ZOFRAN) injection 4 mg (4 mg Intravenous Given 10/6/23 1657)       Diagnostic Studies  Results Reviewed     Procedure Component Value Units Date/Time    Comprehensive metabolic panel [[id number]]  (Abnormal) Collected: 10/06/23 1712    Lab Status: Final result Specimen: Blood from Arm, Right Updated: 10/06/23 1738     Sodium 138 mmol/L      Potassium 3.7 mmol/L      Chloride 105 mmol/L      CO2 27 mmol/L      ANION GAP 6 mmol/L      BUN 11 mg/dL      Creatinine 0.74 mg/dL      Glucose 79 mg/dL      Calcium 9.1 mg/dL      AST 12 U/L      ALT 9 U/L      Alkaline Phosphatase 97 U/L      Total Protein 7.4 g/dL      Albumin 4.3 g/dL      Total Bilirubin 0.80 mg/dL      eGFR 106 ml/min/1.73sq m     Narrative:      [place] guidelines for Chronic Kidney Disease (CKD):   •  Stage 1 with normal or high GFR (GFR > 90 mL/min/1.73 square meters)  •  Stage 2 Mild CKD (GFR = 60-89 mL/min/1.73 square meters)  •  Stage 3A Moderate CKD (GFR = 45-59 mL/min/1.73 square meters)  •  Stage 3B Moderate CKD (GFR = 30-44 mL/min/1.73 square meters)  •  Stage 4 Severe CKD (GFR = 15-29 mL/min/1.73 square meters)  •  Stage 5 End Stage CKD (GFR <15 mL/min/1.73 square meters)  Note: GFR calculation is accurate only with a steady state creatinine    CBC and differential [[id number]]  (Abnormal) Collected: 10/06/23 1712    Lab Status: Final result Specimen: Blood from Arm, Right Updated: 10/06/23 1722     WBC 10.31 Thousand/uL      RBC 5.11 Million/uL      Hemoglobin 14.7 g/dL      Hematocrit 45.7 %      MCV 89 fL      MCH 28.8 pg      MCHC 32.2 g/dL      RDW 13.7 %      MPV 8.8 fL      Platelets 074 Thousands/uL      nRBC 0 /100 WBCs      Neutrophils Relative 75 %      Immat GRANS % 0 %      Lymphocytes Relative 16 %      Monocytes Relative 7 %      Eosinophils Relative 2 %      Basophils Relative 0 %      Neutrophils Absolute 7.73 Thousands/µL      Immature Grans Absolute 0.04 Thousand/uL      Lymphocytes Absolute 1.69 Thousands/µL      Monocytes Absolute 0.67 Thousand/µL      Eosinophils Absolute 0.15 Thousand/µL      Basophils Absolute 0.03 Thousands/µL     POCT pregnancy, urine [[id number]]  (Normal) Resulted: 10/06/23 1639    Lab Status: Final result Updated: 10/06/23 1644     EXT Preg Test, Ur Negative     Control Valid                 No orders to display              Procedures  Procedures         ED Course  ED Course as of 10/06/23 Shen Taveras Oct 06, 2023   1717 Informed by RN that pt has bed bugs                               SBIRT 20yo+    Flowsheet Row Most Recent Value   Initial Alcohol Screen: US AUDIT-C     1. How often do you have a drink containing alcohol? 0 Filed at: 10/06/2023 1644   2. How many drinks containing alcohol do you have on a typical day you are drinking? 0 Filed at: 10/06/2023 1644   3a. Male UNDER 65: How often do you have five or more drinks on one occasion? 0 Filed at: 10/06/2023 1644   3b. FEMALE Any Age, or MALE 65+: How often do you have 4 or more drinks on one occassion? 0 Filed at: 10/06/2023 1644   Audit-C Score 0 Filed at: 10/06/2023 1644   JENN: How many times in the past year have you. ..     Used an illegal drug or used a prescription medication for non-medical reasons? Never Filed at: 10/06/2023 1644                    Medical Decision Making  35 YOF with PMH HTN, stroke, bronchitis presents today with nausea and vomiting x2-3 days. No abdominal tenderness on exam, not in any acute distress- afebrile and non tachy. All labs WNL. preg negative. Likely a viral syndrome. Was able to tolerate PO prior to discharge. Of note a bug was seen crawling on bed- appeared to be a bed bug.     I have discussed the plan to discharge pt from ED. The patient was discharged in stable condition.  Patient ambulated off the department.  Extensive return to emergency department precautions were discussed.  Follow up with appropriate providers including primary care physician was discussed.  Patient and/or their  primary decision maker expressed understanding. Alma Camposu remained stable during entire emergency department stay. Nausea and vomiting: acute illness or injury  Amount and/or Complexity of Data Reviewed  Labs: ordered. Risk  Prescription drug management. Disposition  Final diagnoses:   Nausea and vomiting     Time reflects when diagnosis was documented in both MDM as applicable and the Disposition within this note     Time User Action Codes Description Comment    10/6/2023  6:15 PM Matthew Valles Add [R11.2] Nausea and vomiting       ED Disposition     ED Disposition   Discharge    Condition   Stable    Date/Time   Fri Oct 6, 2023  6:15 PM    [facility] [employer] discharge to home/self care.                Follow-up Information    None         Discharge Medication List as of 10/6/2023  6:16 PM      START taking these medications    Details   ondansetron (ZOFRAN-ODT) 4 mg disintegrating tablet Take 1 tablet (4 mg total) by mouth every 6 (six) hours as needed for nausea or vomiting, Starting Fri 10/6/2023, Normal         CONTINUE these medications which have NOT CHANGED    Details   amLODIPine (NORVASC) 5 mg tablet TAKE 1 TABLET(5 MG) BY MOUTH DAILY, Normal      atorvastatin (LIPITOR) 80 mg tablet TAKE 1 TABLET(80 MG) BY MOUTH DAILY, Normal      bacitracin-polymyxin b (POLYSPORIN) ophthalmic ointment Administer into the left eye 2 (two) times a day, Starting Tue 9/13/2022, Normal      Eliquis 5 MG TAKE 1 TABLET(5 MG) BY MOUTH TWICE DAILY, Normal      famotidine (PEPCID) 20 mg tablet Take 1 tablet (20 mg total) by mouth 2 (two) times a day, Starting Mon 6/7/2021, Normal      levonorgestrel (MIRENA) 20 MCG/24HR IUD 1 Intra Uterine Device by Intrauterine route once for 1 dose, Starting Wed 12/30/2020, Normal      sertraline (Zoloft) 25 mg tablet Take 1 tablet (25 mg total) by mouth daily, Starting Thu 3/16/2023, Normal      traMADol (ULTRAM) 50 mg tablet Take 1 tablet (50 mg total) by mouth every 6 (six) hours as needed for moderate pain, Starting Sun 12/11/2022, Normal      traZODone (DESYREL) 50 mg tablet Take 1 tablet (50 mg total) by mouth daily at bedtime, Starting Mon 7/11/2022, Normal             No discharge procedures on file.     PDMP Review       Value Time User    PDMP Reviewed  Yes 12/11/2022  3:16 PM 1579 Mikal Dominguez PA-C          ED Provider  Electronically Signed by           Jackelin Sanchez PA-C  10/06/23 5495

## 2024-06-05 ENCOUNTER — OFFICE VISIT (OUTPATIENT)
Dept: FAMILY MEDICINE CLINIC | Facility: CLINIC | Age: 34
End: 2024-06-05

## 2024-06-05 VITALS
DIASTOLIC BLOOD PRESSURE: 80 MMHG | HEART RATE: 90 BPM | BODY MASS INDEX: 25.87 KG/M2 | HEIGHT: 63 IN | WEIGHT: 146 LBS | TEMPERATURE: 98.4 F | SYSTOLIC BLOOD PRESSURE: 120 MMHG | OXYGEN SATURATION: 100 %

## 2024-06-05 DIAGNOSIS — F51.01 PRIMARY INSOMNIA: ICD-10-CM

## 2024-06-05 DIAGNOSIS — D68.51 HETEROZYGOUS FACTOR V LEIDEN MUTATION (HCC): ICD-10-CM

## 2024-06-05 DIAGNOSIS — Z91.148 NONCOMPLIANCE WITH MEDICATIONS: ICD-10-CM

## 2024-06-05 DIAGNOSIS — Z59.41 FOOD INSECURITY: ICD-10-CM

## 2024-06-05 DIAGNOSIS — Z00.00 MEDICARE ANNUAL WELLNESS VISIT, SUBSEQUENT: Primary | ICD-10-CM

## 2024-06-05 DIAGNOSIS — Z59.82 INABILITY TO ACQUIRE TRANSPORTATION: ICD-10-CM

## 2024-06-05 DIAGNOSIS — Z13.6 SCREENING FOR CARDIOVASCULAR CONDITION: ICD-10-CM

## 2024-06-05 DIAGNOSIS — T78.40XA ALLERGY, INITIAL ENCOUNTER: ICD-10-CM

## 2024-06-05 DIAGNOSIS — Z86.73 HISTORY OF ISCHEMIC RIGHT MCA STROKE: ICD-10-CM

## 2024-06-05 DIAGNOSIS — G81.14 SPASTIC HEMIPLEGIA AFFECTING LEFT NONDOMINANT SIDE, UNSPECIFIED ETIOLOGY (HCC): ICD-10-CM

## 2024-06-05 DIAGNOSIS — Z13.31 POSITIVE DEPRESSION SCREENING: ICD-10-CM

## 2024-06-05 PROBLEM — R79.89 ELEVATED TROPONIN: Status: RESOLVED | Noted: 2020-08-05 | Resolved: 2024-06-05

## 2024-06-05 PROBLEM — Z83.2 FAMILY HISTORY OF HYPERCOAGULABLE STATE: Status: RESOLVED | Noted: 2020-10-03 | Resolved: 2024-06-05

## 2024-06-05 PROBLEM — U07.1 COVID-19 VIRUS RNA DETECTED: Status: RESOLVED | Noted: 2020-12-26 | Resolved: 2024-06-05

## 2024-06-05 RX ORDER — TRAZODONE HYDROCHLORIDE 100 MG/1
100 TABLET ORAL
Qty: 90 TABLET | Refills: 0 | Status: SHIPPED | OUTPATIENT
Start: 2024-06-05

## 2024-06-05 RX ORDER — CETIRIZINE HYDROCHLORIDE 10 MG/1
10 TABLET ORAL DAILY
Qty: 90 TABLET | Refills: 0 | Status: SHIPPED | OUTPATIENT
Start: 2024-06-05

## 2024-06-05 SDOH — ECONOMIC STABILITY - TRANSPORTATION SECURITY: TRANSPORTATION INSECURITY: Z59.82

## 2024-06-05 SDOH — ECONOMIC STABILITY - FOOD INSECURITY: FOOD INSECURITY: Z59.41

## 2024-06-05 NOTE — PATIENT INSTRUCTIONS
Medicare Preventive Visit Patient Instructions  Thank you for completing your Welcome to Medicare Visit or Medicare Annual Wellness Visit today. Your next wellness visit will be due in one year (6/6/2025).  The screening/preventive services that you may require over the next 5-10 years are detailed below. Some tests may not apply to you based off risk factors and/or age. Screening tests ordered at today's visit but not completed yet may show as past due. Also, please note that scanned in results may not display below.  Preventive Screenings:  Service Recommendations Previous Testing/Comments   Colorectal Cancer Screening  * Colonoscopy    * Fecal Occult Blood Test (FOBT)/Fecal Immunochemical Test (FIT)  * Fecal DNA/Cologuard Test  * Flexible Sigmoidoscopy Age: 45-75 years old   Colonoscopy: every 10 years (may be performed more frequently if at higher risk)  OR  FOBT/FIT: every 1 year  OR  Cologuard: every 3 years  OR  Sigmoidoscopy: every 5 years  Screening may be recommended earlier than age 45 if at higher risk for colorectal cancer. Also, an individualized decision between you and your healthcare provider will decide whether screening between the ages of 76-85 would be appropriate. Colonoscopy: Not on file  FOBT/FIT: Not on file  Cologuard: Not on file  Sigmoidoscopy: Not on file          Breast Cancer Screening Age: 40+ years old  Frequency: every 1-2 years  Not required if history of left and right mastectomy Mammogram: Not on file    Screening Not Indicated   Cervical Cancer Screening Between the ages of 21-29, pap smear recommended once every 3 years.   Between the ages of 30-65, can perform pap smear with HPV co-testing every 5 years.   Recommendations may differ for women with a history of total hysterectomy, cervical cancer, or abnormal pap smears in past. Pap Smear: Not on file        Hepatitis C Screening Once for adults born between 1945 and 1965  More frequently in patients at high risk for Hepatitis  C Hep C Antibody: 04/01/2023    Screening Current   Diabetes Screening 1-2 times per year if you're at risk for diabetes or have pre-diabetes Fasting glucose: 84 mg/dL (4/1/2023)  A1C: 5.4 % (8/5/2020)  Screening Current   Cholesterol Screening Once every 5 years if you don't have a lipid disorder. May order more often based on risk factors. Lipid panel: 04/01/2023    Screening Current     Other Preventive Screenings Covered by Medicare:  Abdominal Aortic Aneurysm (AAA) Screening: covered once if your at risk. You're considered to be at risk if you have a family history of AAA.  Lung Cancer Screening: covers low dose CT scan once per year if you meet all of the following conditions: (1) Age 55-77; (2) No signs or symptoms of lung cancer; (3) Current smoker or have quit smoking within the last 15 years; (4) You have a tobacco smoking history of at least 20 pack years (packs per day multiplied by number of years you smoked); (5) You get a written order from a healthcare provider.  Glaucoma Screening: covered annually if you're considered high risk: (1) You have diabetes OR (2) Family history of glaucoma OR (3)  aged 50 and older OR (4)  American aged 65 and older  Osteoporosis Screening: covered every 2 years if you meet one of the following conditions: (1) You're estrogen deficient and at risk for osteoporosis based off medical history and other findings; (2) Have a vertebral abnormality; (3) On glucocorticoid therapy for more than 3 months; (4) Have primary hyperparathyroidism; (5) On osteoporosis medications and need to assess response to drug therapy.   Last bone density test (DXA Scan): Not on file.  HIV Screening: covered annually if you're between the age of 15-65. Also covered annually if you are younger than 15 and older than 65 with risk factors for HIV infection. For pregnant patients, it is covered up to 3 times per pregnancy.    Immunizations:  Immunization Recommendations    Influenza Vaccine Annual influenza vaccination during flu season is recommended for all persons aged >= 6 months who do not have contraindications   Pneumococcal Vaccine   * Pneumococcal conjugate vaccine = PCV13 (Prevnar 13), PCV15 (Vaxneuvance), PCV20 (Prevnar 20)  * Pneumococcal polysaccharide vaccine = PPSV23 (Pneumovax) Adults 19-63 yo with certain risk factors or if 65+ yo  If never received any pneumonia vaccine: recommend Prevnar 20 (PCV20)  Give PCV20 if previously received 1 dose of PCV13 or PPSV23   Hepatitis B Vaccine 3 dose series if at intermediate or high risk (ex: diabetes, end stage renal disease, liver disease)   Respiratory syncytial virus (RSV) Vaccine - COVERED BY MEDICARE PART D  * RSVPreF3 (Arexvy) CDC recommends that adults 60 years of age and older may receive a single dose of RSV vaccine using shared clinical decision-making (SCDM)   Tetanus (Td) Vaccine - COST NOT COVERED BY MEDICARE PART B Following completion of primary series, a booster dose should be given every 10 years to maintain immunity against tetanus. Td may also be given as tetanus wound prophylaxis.   Tdap Vaccine - COST NOT COVERED BY MEDICARE PART B Recommended at least once for all adults. For pregnant patients, recommended with each pregnancy.   Shingles Vaccine (Shingrix) - COST NOT COVERED BY MEDICARE PART B  2 shot series recommended in those 19 years and older who have or will have weakened immune systems or those 50 years and older     Health Maintenance Due:      Topic Date Due   • Cervical Cancer Screening  Never done   • HIV Screening  Completed   • Hepatitis C Screening  Completed     Immunizations Due:      Topic Date Due   • COVID-19 Vaccine (3 - 2023-24 season) 09/01/2023   • Influenza Vaccine (Season Ended) 09/01/2024     Advance Directives   What are advance directives?  Advance directives are legal documents that state your wishes and plans for medical care. These plans are made ahead of time in case you  lose your ability to make decisions for yourself. Advance directives can apply to any medical decision, such as the treatments you want, and if you want to donate organs.   What are the types of advance directives?  There are many types of advance directives, and each state has rules about how to use them. You may choose a combination of any of the following:  Living will:  This is a written record of the treatment you want. You can also choose which treatments you do not want, which to limit, and which to stop at a certain time. This includes surgery, medicine, IV fluid, and tube feedings.   Durable power of  for healthcare (DPAHC):  This is a written record that states who you want to make healthcare choices for you when you are unable to make them for yourself. This person, called a proxy, is usually a family member or a friend. You may choose more than 1 proxy.  Do not resuscitate (DNR) order:  A DNR order is used in case your heart stops beating or you stop breathing. It is a request not to have certain forms of treatment, such as CPR. A DNR order may be included in other types of advance directives.  Medical directive:  This covers the care that you want if you are in a coma, near death, or unable to make decisions for yourself. You can list the treatments you want for each condition. Treatment may include pain medicine, surgery, blood transfusions, dialysis, IV or tube feedings, and a ventilator (breathing machine).  Values history:  This document has questions about your views, beliefs, and how you feel and think about life. This information can help others choose the care that you would choose.  Why are advance directives important?  An advance directive helps you control your care. Although spoken wishes may be used, it is better to have your wishes written down. Spoken wishes can be misunderstood, or not followed. Treatments may be given even if you do not want them. An advance directive may make  it easier for your family to make difficult choices about your care.   Depression   Depression  is a medical condition that causes feelings of sadness or hopelessness that do not go away. Depression may cause you to lose interest in things you used to enjoy. These feelings may interfere with your daily life.  Call your local emergency number (911 in the ) if:   You think about harming yourself or someone else.  You have done something on purpose to hurt yourself.  The following resources are available at any time to help you, if needed:   National Suicide Prevention Lifeline: 1-748.520.2256 (5-885-237-TALK)   Suicide Hotline: 1-422.505.5235 (4-410-NAUNRRY)   For a list of international numbers: https://save.org/find-help/international-resources/  Treatment for depression may include medicine to relieve depression. Medicine is often used together with therapy. Therapy is a way for you to talk about your feelings and anything that may be causing depression. Therapy can be done alone or in a group. It may also be done with family members or a significant other.  Get regular physical activity.    Create a regular sleep schedule.    Eat a variety of healthy foods.    Do not drink alcohol or use drugs.     Weight Management   Why it is important to manage your weight:  Being overweight increases your risk of health conditions such as heart disease, high blood pressure, type 2 diabetes, and certain types of cancer. It can also increase your risk for osteoarthritis, sleep apnea, and other respiratory problems. Aim for a slow, steady weight loss. Even a small amount of weight loss can lower your risk of health problems.  How to lose weight safely:  A safe and healthy way to lose weight is to eat fewer calories and get regular exercise. You can lose up about 1 pound a week by decreasing the number of calories you eat by 500 calories each day.   Healthy meal plan for weight management:  A healthy meal plan includes a variety  of foods, contains fewer calories, and helps you stay healthy. A healthy meal plan includes the following:  Eat whole-grain foods more often.  A healthy meal plan should contain fiber. Fiber is the part of grains, fruits, and vegetables that is not broken down by your body. Whole-grain foods are healthy and provide extra fiber in your diet. Some examples of whole-grain foods are whole-wheat breads and pastas, oatmeal, brown rice, and bulgur.  Eat a variety of vegetables every day.  Include dark, leafy greens such as spinach, kale, rosenda greens, and mustard greens. Eat yellow and orange vegetables such as carrots, sweet potatoes, and winter squash.   Eat a variety of fruits every day.  Choose fresh or canned fruit (canned in its own juice or light syrup) instead of juice. Fruit juice has very little or no fiber.  Eat low-fat dairy foods.  Drink fat-free (skim) milk or 1% milk. Eat fat-free yogurt and low-fat cottage cheese. Try low-fat cheeses such as mozzarella and other reduced-fat cheeses.  Choose meat and other protein foods that are low in fat.  Choose beans or other legumes such as split peas or lentils. Choose fish, skinless poultry (chicken or turkey), or lean cuts of red meat (beef or pork). Before you cook meat or poultry, cut off any visible fat.   Use less fat and oil.  Try baking foods instead of frying them. Add less fat, such as margarine, sour cream, regular salad dressing and mayonnaise to foods. Eat fewer high-fat foods. Some examples of high-fat foods include french fries, doughnuts, ice cream, and cakes.  Eat fewer sweets.  Limit foods and drinks that are high in sugar. This includes candy, cookies, regular soda, and sweetened drinks.  Exercise:  Exercise at least 30 minutes per day on most days of the week. Some examples of exercise include walking, biking, dancing, and swimming. You can also fit in more physical activity by taking the stairs instead of the elevator or parking farther away from  stores. Ask your healthcare provider about the best exercise plan for you.      © Copyright [employer] 2018 Information is for End User's use only and may not be sold, redistributed or otherwise used for commercial purposes. All illustrations and images included in CareNotes® are the copyrighted property of [employer] or [employer]

## 2024-06-05 NOTE — ASSESSMENT & PLAN NOTE
Sleep hygiene practices   Regular aerobic exercise, avoid napping during the day  Avoid stimulants including caffeine/nicotine  Life stressors can attribute to lack of sleep    Bright lights and electronic screens limited 2 hours before bed   Establish consistent sleep/wake times with a bedtime routine   Ensure the room is dark, cool and quiet   May utilize a sound machine if needed   Can try OTC melatonin   Educated on meditation and relaxation techniques   As we age it is normal to see sleep related changes including   Decreased time spent in a deep sleep   More frequent nighttime awakenings

## 2024-06-05 NOTE — ASSESSMENT & PLAN NOTE
PHQ9 Score 13 moderate depression   Previous medications: Sertraline   Does not want medications at this time   Emotional support provided

## 2024-06-05 NOTE — ASSESSMENT & PLAN NOTE
Patient is no longer taking the following medications due to financial constrictions   Eliquis   Trazodone   Amlodipine   Atorvastatin   Famotidine   Sertraline   Tramadol   Patient agreed to restart Eliquis and Trazodone     Referral placed to social work

## 2024-06-05 NOTE — PROGRESS NOTES
Assessment and Plan:     Problem List Items Addressed This Visit          Nervous and Auditory    Hemiplegia affecting left nondominant side (HCC)     Left hand with residual deficits   Has splint that she wears daily   Would like to regain strength in the LUE   Referral to PT placed         Relevant Orders    Ambulatory Referral to Physical Therapy       Hematopoietic and Hemostatic    Heterozygous factor V Leiden mutation (HCC)     Has not been compliant with medications/care related to cost   Referral placed to hematology   Restarted Eliquis 2.5mg         Relevant Medications    apixaban (ELIQUIS) 2.5 mg    Other Relevant Orders    Ambulatory Referral to Hematology / Oncology    CBC and differential       Behavioral Health    Noncompliance with medications     Patient is no longer taking the following medications due to financial constrictions   Eliquis   Trazodone   Amlodipine   Atorvastatin   Famotidine   Sertraline   Tramadol   Patient agreed to restart Eliquis and Trazodone     Referral placed to social work          Positive depression screening     PHQ9 Score 13 moderate depression   Previous medications: Sertraline   Does not want medications at this time   Emotional support provided             Neurology/Sleep    History of ischemic right MCA stroke     08/2020 Acute right MCA stroke  Not a candidate for t-PA as she was outside the time window, but was candidate for neuro-IR  Mechanical thrombectomy of right M1 occlusion with subsequent  TICI 2a flow.   Repeat head CT 8/6/2020 demonstrated right M1 new / recurrent thromboembolus    Cause of stroke is cryptogenic; is heterozygous for Factor V Leiden    Anticoagulated on eliquis for suspected embolic cause, possible hypercoaguable state.      Necessity for long-term monitoring to rule out silent atrial arrhythmias.    Pt wore long-term mobile cardiac telemetry monitoring x30 days which failed to identify any potential arrhythmias.  10/20 Implanted loop  recorder   Has not followed with cardiology in 2 years   Encouraged follow up   Has not been compliant with medications related to cost   Referral to social work placed          Relevant Orders    Comprehensive metabolic panel    CBC and differential    Ambulatory Referral to Physical Therapy    Primary insomnia     Sleep hygiene practices   Regular aerobic exercise, avoid napping during the day  Avoid stimulants including caffeine/nicotine  Life stressors can attribute to lack of sleep    Bright lights and electronic screens limited 2 hours before bed   Establish consistent sleep/wake times with a bedtime routine   Ensure the room is dark, cool and quiet   May utilize a sound machine if needed   Can try OTC melatonin   Educated on meditation and relaxation techniques   As we age it is normal to see sleep related changes including   Decreased time spent in a deep sleep   More frequent nighttime awakenings            Relevant Medications    traZODone (DESYREL) 100 mg tablet       Other    Allergies     Seasonal allergies   Cetrizine prescribed          Relevant Medications    cetirizine (ZyrTEC) 10 mg tablet     Other Visit Diagnoses       Medicare annual wellness visit, subsequent    -  Primary    Screening for cardiovascular condition        Relevant Orders    Lipid panel    BMI 25.0-25.9,adult        Food insecurity        Relevant Orders    Ambulatory referral to social work care management program    Inability to acquire transportation        Relevant Orders    Ambulatory referral to social work care management program             Preventive health issues were discussed with patient, and age appropriate screening tests were ordered as noted in patient's After Visit Summary.  Personalized health advice and appropriate referrals for health education or preventive services given if needed, as noted in patient's After Visit Summary.     History of Present Illness:     Patient presents for a Medicare Wellness  Visit    HPI   Patient Care Team:  ZOE Gambino as PCP - General (Nurse Practitioner)  Brett Mares MD as PCP - PCP-[employer] (RTE)     Review of Systems:     Review of Systems   Problem List:     Patient Active Problem List   Diagnosis    Hemiplegia affecting left nondominant side (HCC)    Heterozygous factor V Leiden mutation (HCC)    History of ischemic right MCA stroke    Noncompliance with medications    Primary insomnia    Allergies    Positive depression screening      Past Medical and Surgical History:     Past Medical History:   Diagnosis Date    Anemia 02/12/2015    Bronchitis     COVID-19 virus RNA detected 12/26/2020    Elevated troponin 08/05/2020    Embolus (HCC)     Family history of hypercoagulable state 10/03/2020    Last Assessment & Plan:   Patient's half sister with history of arterial embolism.  Question yet undiagnosed familial cause of hypercoaguability.  Continue eliquis for now for stroke prevention while undergoing workup for cardiac arrhythmias.  Formal hematology evaluation for their review and comments regarding long term anticoagulation in this patient.      Hypertension     Lymphadenopathy 10/31/2014    Stroke (cerebrum) (HCC)      Past Surgical History:   Procedure Laterality Date    TONSILLECTOMY  09/01/2013      Family History:     Family History   Problem Relation Age of Onset    Hypertension Mother     Diabetes Maternal Grandfather       Social History:     Social History     Socioeconomic History    Marital status: Single     Spouse name: None    Number of children: None    Years of education: None    Highest education level: None   Occupational History    None   Tobacco Use    Smoking status: Former     Current packs/day: 0.00     Types: Cigarettes     Quit date: 3/31/2021     Years since quitting: 3.1    Smokeless tobacco: Never    Tobacco comments:     2 cigarettes per day   Vaping Use    Vaping status: Never Used   Substance and Sexual Activity     Alcohol use: Not Currently     Comment: rarely    Drug use: No    Sexual activity: Not Currently   Other Topics Concern    None   Social History Narrative    None     Social Determinants of Health     Financial Resource Strain: Not on file   Food Insecurity: Food Insecurity Present (6/5/2024)    Hunger Vital Sign     Worried About Running Out of Food in the Last Year: Often true     Ran Out of Food in the Last Year: Often true   Transportation Needs: Unmet Transportation Needs (6/5/2024)    PRAPARE - Transportation     Lack of Transportation (Medical): Yes     Lack of Transportation (Non-Medical): Yes   Physical Activity: Not on file   Stress: Not on file   Social Connections: Not on file   Intimate Partner Violence: Not on file   Housing Stability: Unknown (6/5/2024)    Housing Stability Vital Sign     Unable to Pay for Housing in the Last Year: No     Number of Times Moved in the Last Year: Not on file     Homeless in the Last Year: No      Medications and Allergies:     Current Outpatient Medications   Medication Sig Dispense Refill    apixaban (ELIQUIS) 2.5 mg Take 1 tablet (2.5 mg total) by mouth 2 (two) times a day 180 tablet 3    cetirizine (ZyrTEC) 10 mg tablet Take 1 tablet (10 mg total) by mouth daily 90 tablet 0    traZODone (DESYREL) 100 mg tablet Take 1 tablet (100 mg total) by mouth daily at bedtime 90 tablet 0     No current facility-administered medications for this visit.     Allergies   Allergen Reactions    Azithromycin Anaphylaxis      Immunizations:     Immunization History   Administered Date(s) Administered    COVID-19 MODERNA VACC 0.5 ML IM 08/31/2021, 09/28/2021    DT (pediatric) 1990, 01/05/2001    DTaP 1990, 1990, 09/04/1991, 04/28/1995    Hep B, Adolescent or Pediatric 05/04/1993    Hep B, adult 06/03/1993, 08/12/1999    HiB 02/04/1991, 04/25/1991, 06/27/1991, 09/04/1991    IPV 1990, 1990, 09/04/1991, 04/28/1995    Influenza, injectable, quadrivalent,  preservative free 0.5 mL 12/11/2020    MMR 06/27/1991, 08/12/1999    Meningococcal Conjugate (MCV4O) 06/08/2007    Meningococcal MCV4, Unspecified 06/08/2007    Meningococcal MCV4P 06/08/2007    Tdap 08/09/2018    Varicella 08/12/1991      Health Maintenance:         Topic Date Due    Cervical Cancer Screening  Never done    HIV Screening  Completed    Hepatitis C Screening  Completed         Topic Date Due    COVID-19 Vaccine (3 - 2023-24 season) 09/01/2023    Influenza Vaccine (Season Ended) 09/01/2024      Medicare Screening Tests and Risk Assessments:     Shanelle is here for her Subsequent Wellness visit. Last Medicare Wellness visit information reviewed, patient interviewed, no change since last AWV.     Health Risk Assessment:   Patient rates overall health as fair. Patient feels that their physical health rating is slightly worse. Patient is dissatisfied with their life. Eyesight was rated as same. Hearing was rated as same. Patient feels that their emotional and mental health rating is slightly worse. Patients states they are never, rarely angry. Patient states they are never, rarely unusually tired/fatigued. Pain experienced in the last 7 days has been none. Patient states that she has experienced weight loss or gain in last 6 months.     Depression Screening:   PHQ-2 Score: 4  PHQ-9 Score: 13      Fall Risk Screening:   In the past year, patient has experienced: no history of falling in past year      Urinary Incontinence Screening:   Patient has not leaked urine accidently in the last six months.     Home Safety:  Patient has trouble with stairs inside or outside of their home. Patient has working smoke alarms and has working carbon monoxide detector. Home safety hazards include: none.     Nutrition:   Current diet is Regular.     Medications:   Patient is currently taking over-the-counter supplements. OTC medications include: see medication list. Patient is able to manage medications.     Activities of  Daily Living (ADLs)/Instrumental Activities of Daily Living (IADLs):   Walk and transfer into and out of bed and chair?: Yes  Dress and groom yourself?: Yes    Bathe or shower yourself?: Yes    Feed yourself? Yes  Do your laundry/housekeeping?: Yes  Manage your money, pay your bills and track your expenses?: Yes  Make your own meals?: Yes    Do your own shopping?: Yes    Previous Hospitalizations:   Any hospitalizations or ED visits within the last 12 months?: No      Advance Care Planning:   Living will: No      PREVENTIVE SCREENINGS      Cardiovascular Screening:    General: Screening Current      Diabetes Screening:     General: Screening Current      Breast Cancer Screening:     General: Screening Not Indicated      Lung Cancer Screening:     General: Screening Not Indicated      Hepatitis C Screening:    General: Screening Current    Screening, Brief Intervention, and Referral to Treatment (SBIRT)    Screening  Typical number of drinks in a day: 0  Typical number of drinks in a week: 0  Interpretation: Low risk drinking behavior.    Single Item Drug Screening:  How often have you used an illegal drug (including marijuana) or a prescription medication for non-medical reasons in the past year? never    Single Item Drug Screen Score: 0  Interpretation: Negative screen for possible drug use disorder    Brief Intervention  Alcohol & drug use screenings were reviewed. No concerns regarding substance use disorder identified.     Time Spent  Time spent screening/evaluating the patient for alcohol misuse: 0 minutes. Time spent providing alcohol/substance abuse assessment and intervention services: 0 minutes.    Annual Depression Screening  Time spent screening and evaluating the patient for depression during today's encounter was 5 minutes.    Other Counseling Topics:   Car/seat belt/driving safety, skin self-exam, sunscreen and calcium and vitamin D intake and regular weightbearing exercise.     No results found.      "Physical Exam:     /80 (BP Location: Left arm, Patient Position: Sitting, Cuff Size: Adult)   Pulse 90   Temp 98.4 °F (36.9 °C) (Temporal)   Ht 5' 3\" (1.6 m)   Wt 66.2 kg (146 lb)   SpO2 100%   BMI 25.86 kg/m²     Physical Exam  Vitals and nursing note reviewed.   Constitutional:       General: She is not in acute distress.     Appearance: Normal appearance. She is well-developed and normal weight.   HENT:      Head: Normocephalic and atraumatic.      Right Ear: Tympanic membrane, ear canal and external ear normal. There is no impacted cerumen.      Left Ear: Tympanic membrane, ear canal and external ear normal. There is no impacted cerumen.      Nose: Nose normal.      Mouth/Throat:      Mouth: Mucous membranes are moist.      Pharynx: Oropharynx is clear.   Eyes:      Extraocular Movements: Extraocular movements intact.      Conjunctiva/sclera: Conjunctivae normal.      Pupils: Pupils are equal, round, and reactive to light.   Cardiovascular:      Rate and Rhythm: Normal rate and regular rhythm.      Pulses: Normal pulses.      Heart sounds: Normal heart sounds. No murmur heard.  Pulmonary:      Effort: Pulmonary effort is normal. No respiratory distress.      Breath sounds: Normal breath sounds.   Abdominal:      General: Bowel sounds are normal.      Palpations: Abdomen is soft.      Tenderness: There is no abdominal tenderness.   Musculoskeletal:         General: Normal range of motion.      Cervical back: Normal range of motion and neck supple.      Right lower leg: No edema.      Left lower leg: No edema.   Skin:     General: Skin is warm and dry.      Capillary Refill: Capillary refill takes less than 2 seconds.   Neurological:      General: No focal deficit present.      Mental Status: She is alert and oriented to person, place, and time. Mental status is at baseline.   Psychiatric:         Mood and Affect: Mood normal.         Behavior: Behavior normal.         Thought Content: Thought content " normal.         Judgment: Judgment normal.          Jessa Gambino Screening Follow-up Plan: Patient's depression screening was positive with a PHQ-2 score of 4. Their PHQ-9 score was 13. Patient assessed for underlying major depression. They have no active suicidal ideations. Brief counseling provided and recommend additional follow-up/re-evaluation next office visit.

## 2024-06-05 NOTE — ASSESSMENT & PLAN NOTE
Left hand with residual deficits   Has splint that she wears daily   Would like to regain strength in the LUE   Referral to PT placed

## 2024-06-05 NOTE — ASSESSMENT & PLAN NOTE
08/2020 Acute right MCA stroke  Not a candidate for t-PA as she was outside the time window, but was candidate for neuro-IR  Mechanical thrombectomy of right M1 occlusion with subsequent  TICI 2a flow.   Repeat head CT 8/6/2020 demonstrated right M1 new / recurrent thromboembolus    Cause of stroke is cryptogenic; is heterozygous for Factor V Leiden    Anticoagulated on eliquis for suspected embolic cause, possible hypercoaguable state.      Necessity for long-term monitoring to rule out silent atrial arrhythmias.    Pt wore long-term mobile cardiac telemetry monitoring x30 days which failed to identify any potential arrhythmias.  10/20 Implanted loop recorder   Has not followed with cardiology in 2 years   Encouraged follow up   Has not been compliant with medications related to cost   Referral to social work placed

## 2024-06-05 NOTE — ASSESSMENT & PLAN NOTE
Has not been compliant with medications/care related to cost   Referral placed to hematology   Restarted Eliquis 2.5mg

## 2024-06-07 ENCOUNTER — PATIENT OUTREACH (OUTPATIENT)
Dept: FAMILY MEDICINE CLINIC | Facility: CLINIC | Age: 34
End: 2024-06-07

## 2024-06-07 DIAGNOSIS — Z59.82 TRANSPORTATION INSECURITY: Primary | ICD-10-CM

## 2024-06-07 SDOH — ECONOMIC STABILITY - TRANSPORTATION SECURITY: TRANSPORTATION INSECURITY: Z59.82
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
## 2024-06-11 ENCOUNTER — TELEPHONE (OUTPATIENT)
Dept: FAMILY MEDICINE CLINIC | Facility: CLINIC | Age: 34
End: 2024-06-11

## 2024-06-11 NOTE — TELEPHONE ENCOUNTER
Called patient  disability forms completed by Kallie  patient will need to fill out the remainder of forms    ready for

## 2024-06-14 ENCOUNTER — TELEPHONE (OUTPATIENT)
Dept: HEMATOLOGY ONCOLOGY | Facility: CLINIC | Age: 34
End: 2024-06-14

## 2024-06-14 NOTE — TELEPHONE ENCOUNTER
I called Shanelle in response to a referral that was received for patient to establish care with Medical Oncology.     Outreach was made to schedule a consultation.    I left a voicemail explaining the reason for my call and advised patient to call [facility] at [phone].  Another attempt will be made to contact patient.

## 2024-06-17 ENCOUNTER — PATIENT OUTREACH (OUTPATIENT)
Dept: FAMILY MEDICINE CLINIC | Facility: CLINIC | Age: 34
End: 2024-06-17

## 2024-06-17 NOTE — PROGRESS NOTES
CMOC spoke with pt and introduced self. Pt was in agreement to receive assistance with applying for [facility] services. CMOC completed [facility] assessment by phone with pt and [facility] and sent by email.Patient was referred on [employer] to [facility] for transit    Will F/U by 6/1

## 2024-07-02 ENCOUNTER — PATIENT OUTREACH (OUTPATIENT)
Dept: FAMILY MEDICINE CLINIC | Facility: CLINIC | Age: 34
End: 2024-07-02

## 2024-07-02 NOTE — PROGRESS NOTES
OC spoke with [employer] representative Anam. Anam communicated that pt application was not in their system and that the best way to send application would be by mail or in person, because they are not receiving [facility] emailed applications.  [facility] will mail pt application today to [employer].    [facility] mailed pt Lena an application to:  [employer]  [address] [place] [id number]

## 2024-07-09 ENCOUNTER — PATIENT OUTREACH (OUTPATIENT)
Dept: FAMILY MEDICINE CLINIC | Facility: CLINIC | Age: 34
End: 2024-07-09

## 2024-07-09 NOTE — PROGRESS NOTES
CMOC spoke with Anam from [employer] who communicated they did receive pt application and contacted pt this morning to communicate that a Disability application will need to be completed by pt doctor or have pt come in to be evaluated by [facility] doctor. Anam communicated that pt preferred for [employer] to mail her the disability form to give to her doctor to complete instead.     Will F/U by 7/23

## 2024-07-23 ENCOUNTER — PATIENT OUTREACH (OUTPATIENT)
Dept: FAMILY MEDICINE CLINIC | Facility: CLINIC | Age: 34
End: 2024-07-23

## 2024-07-23 NOTE — PROGRESS NOTES
CMOC attempted outreach to pt this morning regarding [facility] follow-up. CMOC then sent pt an email communicating  a voicemail was left for pt asking for an update regarding [facility] CMOC inquired if pt had provided her doctor the disability form that [facility] sent you by mail?      Will F/U by 7/26

## 2024-07-26 ENCOUNTER — PATIENT OUTREACH (OUTPATIENT)
Dept: FAMILY MEDICINE CLINIC | Facility: CLINIC | Age: 34
End: 2024-07-26

## 2024-07-26 NOTE — PROGRESS NOTES
CMOC spoke with pt and pt communicated she gave her [employer] Disability forms to her doctor office. Pt stated she will pick the forms up when done, CMOC communicated to pt to ask the doctor's office to mail in the completed forms to the address on the documents, since pt would have to mail the forms herself. Pt stated that she will do just that and give the doctor's office a call.    Will F/U with pt by 8/2

## 2024-08-02 ENCOUNTER — PATIENT OUTREACH (OUTPATIENT)
Dept: FAMILY MEDICINE CLINIC | Facility: CLINIC | Age: 34
End: 2024-08-02

## 2024-08-02 NOTE — PROGRESS NOTES
CMOC spoke with [employer] ryan. [employer] Communicated that they are still waiting to receive pt completed Disability forms by pt PCP and that the forms were sent out to pt on 7/9.    Will F/U by 8/15

## 2024-08-12 ENCOUNTER — TELEPHONE (OUTPATIENT)
Age: 34
End: 2024-08-12

## 2024-08-12 NOTE — TELEPHONE ENCOUNTER
Patient called stating she needs a printout of her diagnosis for housing assistance.  Please prepare this for patient  today. Please call patient when this is ready to be picked up.

## 2024-08-15 ENCOUNTER — PATIENT OUTREACH (OUTPATIENT)
Dept: CASE MANAGEMENT | Facility: HOSPITAL | Age: 34
End: 2024-08-15

## 2024-08-15 ENCOUNTER — OFFICE VISIT (OUTPATIENT)
Dept: GYNECOLOGY | Facility: CLINIC | Age: 34
End: 2024-08-15
Payer: COMMERCIAL

## 2024-08-15 ENCOUNTER — TELEPHONE (OUTPATIENT)
Age: 34
End: 2024-08-15

## 2024-08-15 VITALS — BODY MASS INDEX: 23.95 KG/M2 | WEIGHT: 135.2 LBS | DIASTOLIC BLOOD PRESSURE: 80 MMHG | SYSTOLIC BLOOD PRESSURE: 126 MMHG

## 2024-08-15 DIAGNOSIS — N94.89 OTHER SPECIFIED CONDITIONS ASSOCIATED WITH FEMALE GENITAL ORGANS AND MENSTRUAL CYCLE: ICD-10-CM

## 2024-08-15 DIAGNOSIS — G81.14 SPASTIC HEMIPLEGIA AFFECTING LEFT NONDOMINANT SIDE, UNSPECIFIED ETIOLOGY (HCC): Primary | ICD-10-CM

## 2024-08-15 DIAGNOSIS — N92.6 IRREGULAR MENSES: ICD-10-CM

## 2024-08-15 DIAGNOSIS — Z86.73 HISTORY OF ISCHEMIC RIGHT MCA STROKE: ICD-10-CM

## 2024-08-15 DIAGNOSIS — Z31.69 ENCOUNTER FOR PRECONCEPTION CONSULTATION: ICD-10-CM

## 2024-08-15 DIAGNOSIS — D68.51 HETEROZYGOUS FACTOR V LEIDEN MUTATION (HCC): ICD-10-CM

## 2024-08-15 PROCEDURE — 99203 OFFICE O/P NEW LOW 30 MIN: CPT | Performed by: OBSTETRICS & GYNECOLOGY

## 2024-08-15 RX ORDER — ONABOTULINUMTOXINA 100 [USP'U]/1
INJECTION, POWDER, LYOPHILIZED, FOR SOLUTION INTRADERMAL; INTRAMUSCULAR
COMMUNITY
Start: 2024-07-03

## 2024-08-15 NOTE — PROGRESS NOTES
CMOC attempted outreach to pt and left a message regarding if pt had an update regarding [employer]. CMOC then sent pt an email communicating left pt a voicemail following up if pt heard or received any updates regarding her [employer] application. Please feel free to call [employer] [phone] and please let me know of your outcome!    Will F/U by 8/23

## 2024-08-15 NOTE — PROGRESS NOTES
Assessment & Plan   Diagnoses and all orders for this visit:    Spastic hemiplegia affecting left nondominant side, unspecified etiology (HCC)  -     Ambulatory Referral to Maternal Fetal Medicine; Future    Heterozygous factor V Leiden mutation (HCC)  -     Ambulatory Referral to Maternal Fetal Medicine; Future    History of ischemic right MCA stroke  -     Ambulatory Referral to Maternal Fetal Medicine; Future    Encounter for preconception consultation  -     Ambulatory Referral to Maternal Fetal Medicine; Future    Irregular menses  -     CBC and Platelet; Future  -     hCG, quantitative; Future  -     TSH, 3rd generation; Future  -     Prolactin; Future    Other specified conditions associated with female genital organs and menstrual cycle  -     hCG, quantitative; Future    Other orders  -     Botox 100 units    1. infertility consult-patient came today to establish care and arrange for infertility evaluation.  We discussed this at some length.  We discussed blood test, HSG, ultrasound, and semen analysis on her partner.  She was also given the infertility code N97.9 And encouraged to check with her insurance company to determine about coverage regarding any testing for this diagnosis.  We will hold on starting infertility evaluation until the below noted issues are evaluated.  2. history of right MCA stroke with left spastic hemiplegia/spastic gait-noted 8/4/2020.  Patient was treated with Eliquis in the past, no longer on any medications.  She has not seen a specialist for some time, but does have follow-up with neurology scheduled 8/27/2024.  Highly encouraged to follow-up with them for medical management as needed.  3.  Factor V Leiden mutation-noted on her chart.  She does have appointment with hematology 9/12/2024.  4. irregular bleeding-as noted menstrual cycles lasting about 7 days, off for 1 to 2 weeks and then notes bleeding for 2 to 7 days after that.  She will then have off again for another week  or so and then resume with menstrual cycle.  This has been going on for many years.  She has never had this evaluated.  Laboratory sheet given for CBC, TSH, hCG, and prolactin.  To follow-up in the near future for sonohysterogram with endometrial biopsy.  Counseled to take ibuprofen prior to it.  4. lack of contraception-partner for 3 years time, no contraception and no pregnancy.  5. STD-last tested 2/20/2020 with GC/chlamydia negative.  Consider repeat testing at follow-up visit.  6.  Preconceptual-given the stroke with hemiplegia as noted above and the factor V Leiden mutation, agree with plans for neurology and hematology respectively.  Would recommend referral to [facility] for preconceptual consultation as well and referral was placed.  Patient is interested in proceeding with this.  She is not taking vitamins, wrote down folic acid 0.4 mg or MCG and she will start taking this immediately.   recommend up-to-date with vaccinations.  Recommend avoidance of smoking, drinking, drug use.  She does smoke a little bit, encouraged to stop.  Counseled about risk for miscarriage, IUGR, and increased risk for stillbirth and SIDS related cigarettes.  She denies any birth defects in her or her partner's family.      Subjective   Patient ID: Shanelle Robledo is a 34 y.o. female.    Vitals:    08/15/24 0816   BP: 126/80     Patient was seen today for new patient evaluation.  Please see assessment plan for details.      The following portions of the patient's history were reviewed and updated as appropriate: allergies, current medications, past family history, past medical history, past social history, past surgical history, and problem list.  Past Medical History:   Diagnosis Date    Anemia 02/12/2015    Bronchitis     COVID-19 virus RNA detected 12/26/2020    Elevated troponin 08/05/2020    Embolus (HCC)     Family history of hypercoagulable state 10/03/2020    Last Assessment & Plan:   Patient's half sister with history of arterial  embolism.  Question yet undiagnosed familial cause of hypercoaguability.  Continue eliquis for now for stroke prevention while undergoing workup for cardiac arrhythmias.  Formal hematology evaluation for their review and comments regarding long term anticoagulation in this patient.      Hypertension     Lymphadenopathy 10/31/2014    Stroke (cerebrum) (HCC)      Past Surgical History:   Procedure Laterality Date    TONSILLECTOMY  2013     OB History    Para Term  AB Living   0 0 0 0 0 0   SAB IAB Ectopic Multiple Live Births   0 0 0 0 0       Current Outpatient Medications:     apixaban (ELIQUIS) 2.5 mg, Take 1 tablet (2.5 mg total) by mouth 2 (two) times a day, Disp: 180 tablet, Rfl: 3    Botox 100 units, , Disp: , Rfl:     cetirizine (ZyrTEC) 10 mg tablet, Take 1 tablet (10 mg total) by mouth daily (Patient not taking: Reported on 8/15/2024), Disp: 90 tablet, Rfl: 0    traZODone (DESYREL) 100 mg tablet, Take 1 tablet (100 mg total) by mouth daily at bedtime (Patient not taking: Reported on 8/15/2024), Disp: 90 tablet, Rfl: 0  Allergies   Allergen Reactions    Azithromycin Anaphylaxis     Social History     Socioeconomic History    Marital status: Single     Spouse name: None    Number of children: None    Years of education: None    Highest education level: None   Occupational History    None   Tobacco Use    Smoking status: Every Day     Current packs/day: 0.00     Types: Cigarettes     Last attempt to quit: 3/31/2021     Years since quitting: 3.3    Smokeless tobacco: Never    Tobacco comments:     2 cigarettes per day   Vaping Use    Vaping status: Never Used   Substance and Sexual Activity    Alcohol use: Not Currently    Drug use: No    Sexual activity: Not Currently   Other Topics Concern    None   Social History Narrative    None     Social Determinants of Health     Financial Resource Strain: Not on file   Food Insecurity: Food Insecurity Present (2024)    Hunger Vital Sign      Worried About Running Out of Food in the Last Year: Often true     Ran Out of Food in the Last Year: Often true   Transportation Needs: Unmet Transportation Needs (6/5/2024)    PRAPARE - Transportation     Lack of Transportation (Medical): Yes     Lack of Transportation (Non-Medical): Yes   Physical Activity: Not on file   Stress: Not on file   Social Connections: Not on file   Intimate Partner Violence: Not on file   Housing Stability: Unknown (6/5/2024)    Housing Stability Vital Sign     Unable to Pay for Housing in the Last Year: No     Number of Times Moved in the Last Year: Not on file     Homeless in the Last Year: No     Family History   Problem Relation Age of Onset    Hypertension Mother     Diabetes Maternal Grandfather        Review of Systems   Constitutional:  Negative for chills, diaphoresis, fatigue and fever.   Respiratory:  Negative for apnea, cough, chest tightness, shortness of breath and wheezing.    Cardiovascular:  Negative for chest pain, palpitations and leg swelling.   Gastrointestinal:  Negative for abdominal distention, abdominal pain, anal bleeding, constipation, diarrhea, nausea, rectal pain and vomiting.   Genitourinary:  Negative for difficulty urinating, dyspareunia, dysuria, frequency, hematuria, menstrual problem, pelvic pain, urgency, vaginal bleeding, vaginal discharge and vaginal pain.   Musculoskeletal:  Negative for arthralgias, back pain and myalgias.   Skin:  Negative for color change and rash.   Neurological:  Negative for dizziness, syncope, light-headedness, numbness and headaches.   Hematological:  Negative for adenopathy. Does not bruise/bleed easily.   Psychiatric/Behavioral:  Negative for dysphoric mood and sleep disturbance. The patient is not nervous/anxious.        Objective   Physical Exam  OBGyn Exam     Objective      /80 (BP Location: Right arm, Patient Position: Sitting)   Wt 61.3 kg (135 lb 3.2 oz)   LMP 08/07/2024   BMI 23.95 kg/m²     General:    alert and oriented, in no acute distress   Neck:    Breast:    Heart:    Lungs:    Abdomen: Nontender   Vulva:    Vagina:    Cervix:    Uterus:    Adnexa:    Rectum:     Psych:  Normal mood and affect   Skin:  Without obvious lesions   Eyes: symmetric, with normal movements and reactivity   Musculoskeletal:  Normal muscle tone and movements appreciated

## 2024-08-20 ENCOUNTER — TELEPHONE (OUTPATIENT)
Age: 34
End: 2024-08-20

## 2024-08-20 DIAGNOSIS — G81.14 SPASTIC HEMIPLEGIA AFFECTING LEFT NONDOMINANT SIDE, UNSPECIFIED ETIOLOGY (HCC): Primary | ICD-10-CM

## 2024-08-23 ENCOUNTER — PATIENT OUTREACH (OUTPATIENT)
Dept: CASE MANAGEMENT | Facility: HOSPITAL | Age: 34
End: 2024-08-23

## 2024-08-23 NOTE — PROGRESS NOTES
CMOC spoke with pt and she communicated that she had got herself a car. Therefore, pt have no need to use [facility] at this time, but Thanked CMOC for following up! CMOC will close pt.

## 2024-08-27 ENCOUNTER — TELEPHONE (OUTPATIENT)
Age: 34
End: 2024-08-27

## 2024-08-27 NOTE — TELEPHONE ENCOUNTER
[facility] Maternal Fetal Med Pod Clerical  Please schedule a VV or face to face preconception visit with [facility] physician.  Thanks,  Elba

## 2024-08-28 ENCOUNTER — PATIENT OUTREACH (OUTPATIENT)
Dept: CASE MANAGEMENT | Facility: OTHER | Age: 34
End: 2024-08-28

## 2024-08-28 NOTE — PROGRESS NOTES
Received IB from [facility] that pt is no longer in need of [facility] services. SW CM removed from care team at this time. SW CM will be available if needed, via new order.

## 2024-09-03 ENCOUNTER — TELEPHONE (OUTPATIENT)
Age: 34
End: 2024-09-03

## 2024-09-03 NOTE — TELEPHONE ENCOUNTER
Patient called in and needs us to refax the ppwk scanned into the chart on 8/19/24 but with her dx codes on the form.  Pls advise if we can not honor request.

## 2024-09-10 DIAGNOSIS — F51.01 PRIMARY INSOMNIA: ICD-10-CM

## 2024-09-10 RX ORDER — TRAZODONE HYDROCHLORIDE 100 MG/1
TABLET ORAL
Qty: 90 TABLET | Refills: 1 | Status: SHIPPED | OUTPATIENT
Start: 2024-09-10

## 2024-09-12 ENCOUNTER — TELEPHONE (OUTPATIENT)
Dept: HEMATOLOGY ONCOLOGY | Facility: CLINIC | Age: 34
End: 2024-09-12

## 2024-09-12 NOTE — TELEPHONE ENCOUNTER
Pt was a NS for her appt today w/ Aylin Driver on 9/12 at 11:00. Pt picked up the phone but hung up on me as soon as I introduced myself. If patient calls back to reschedule she needs to be scheduled w a MEDICAL DOCTOR not a AP or NP. Will send pt a MYC message.

## 2024-09-17 ENCOUNTER — OFFICE VISIT (OUTPATIENT)
Dept: PODIATRY | Facility: CLINIC | Age: 34
End: 2024-09-17
Payer: COMMERCIAL

## 2024-09-17 VITALS
WEIGHT: 136 LBS | BODY MASS INDEX: 24.1 KG/M2 | HEIGHT: 63 IN | RESPIRATION RATE: 18 BRPM | SYSTOLIC BLOOD PRESSURE: 125 MMHG | DIASTOLIC BLOOD PRESSURE: 80 MMHG

## 2024-09-17 DIAGNOSIS — B35.1 ONYCHOMYCOSIS: Primary | ICD-10-CM

## 2024-09-17 DIAGNOSIS — M79.675 PAIN IN TOE OF LEFT FOOT: ICD-10-CM

## 2024-09-17 PROCEDURE — 99203 OFFICE O/P NEW LOW 30 MIN: CPT | Performed by: PODIATRIST

## 2024-09-17 PROCEDURE — 11720 DEBRIDE NAIL 1-5: CPT | Performed by: PODIATRIST

## 2024-09-17 NOTE — PROGRESS NOTES
"Ambulatory Visit  Name: Shanelle Robledo      : 1990      MRN: [id number]  Encounter Provider: George Perez DPM  Encounter Date: 2024   Encounter department: [facility] [place]    Explained to patient that she is dealing with a fungal infection of her toenails.  Discussed treatment options.  Explained that topical medications have a very poor success rate.  Recommended oral Lamisil.  Explained that this medication has a 60% success rate and after taking the medication we will not know if it is successful for 9 months.    Debridement of left second nail performed today due to the pain.  Consider total matrixectomy if nail remains too painful.    Patient referred for an hepatic function panel.  She will be contacted with results and medication prescribed if within normal limits.  Assessment & Plan  Onychomycosis    Orders:    Hepatic function panel; Future    Pain in toe of left foot           History of Present Illness     Shanelle Robledo is a 34 y.o. female who presents with concern regarding the condition of her toenails.  Each toenail is thick and discolored yellow/brown.  The left second toenail is painful and the matrix also seems thickened.  Shoe pressure causes pain in this particular toe.  Patient denies a history of athlete's foot.  She does have bilateral bunions but they are asymptomatic.    Patient suffered a stroke in  and she states that is when the toenails became an issue.          Review of Systems   Cardiovascular:         History of TIA   Neurological:         Left-sided weakness; hemiplegia   Hematological:  Bruises/bleeds easily.        Factor V bleeding mutation               Objective     /80   Resp 18   Ht 5' 3\" (1.6 m)   Wt 61.7 kg (136 lb)   BMI 24.09 kg/m²     Physical Exam  Constitutional:       Appearance: Normal appearance.   Cardiovascular:      Pulses: Normal pulses.   Musculoskeletal:         General: Deformity present.      Comments: Hallux valgus " deformity bilateral.  Bunions are asymptomatic.   Skin:     Comments: All toenails are thick, discolored and dystrophic.  Left second toenail is extremely thick and pressure causes pain.   Neurological:      General: No focal deficit present.      Mental Status: She is oriented to person, place, and time.

## 2024-09-18 ENCOUNTER — TELEPHONE (OUTPATIENT)
Age: 34
End: 2024-09-18

## 2024-09-18 DIAGNOSIS — Z13.31 POSITIVE DEPRESSION SCREENING: Primary | ICD-10-CM

## 2024-09-18 NOTE — TELEPHONE ENCOUNTER
Patient called the RX Refill Line. Message is being forwarded to the office.     Patient is requesting a referral for Psychology    Please contact patient at [phone]

## 2024-09-19 ENCOUNTER — PROCEDURE VISIT (OUTPATIENT)
Dept: GYNECOLOGY | Facility: CLINIC | Age: 34
End: 2024-09-19
Payer: COMMERCIAL

## 2024-09-19 ENCOUNTER — TELEPHONE (OUTPATIENT)
Age: 34
End: 2024-09-19

## 2024-09-19 ENCOUNTER — ULTRASOUND (OUTPATIENT)
Dept: GYNECOLOGY | Facility: CLINIC | Age: 34
End: 2024-09-19
Payer: COMMERCIAL

## 2024-09-19 DIAGNOSIS — N92.6 IRREGULAR MENSES: Primary | ICD-10-CM

## 2024-09-19 DIAGNOSIS — D25.2 SUBSEROUS LEIOMYOMA OF UTERUS: ICD-10-CM

## 2024-09-19 DIAGNOSIS — N93.9 ABNORMAL UTERINE BLEEDING (AUB): Primary | ICD-10-CM

## 2024-09-19 LAB — SL AMB POCT URINE HCG: NORMAL

## 2024-09-19 PROCEDURE — 58100 BIOPSY OF UTERUS LINING: CPT | Performed by: OBSTETRICS & GYNECOLOGY

## 2024-09-19 PROCEDURE — 58340 CATHETER FOR HYSTEROGRAPHY: CPT | Performed by: OBSTETRICS & GYNECOLOGY

## 2024-09-19 PROCEDURE — 76831 ECHO EXAM UTERUS: CPT | Performed by: OBSTETRICS & GYNECOLOGY

## 2024-09-19 PROCEDURE — 88305 TISSUE EXAM BY PATHOLOGIST: CPT | Performed by: PATHOLOGY

## 2024-09-19 PROCEDURE — 81025 URINE PREGNANCY TEST: CPT | Performed by: OBSTETRICS & GYNECOLOGY

## 2024-09-19 PROCEDURE — 99213 OFFICE O/P EST LOW 20 MIN: CPT | Performed by: OBSTETRICS & GYNECOLOGY

## 2024-09-19 NOTE — PROGRESS NOTES
Assessment & Plan   Diagnoses and all orders for this visit:    Irregular menses    Subserous leiomyoma of uterus    1. irregular menses-at previous visit on 8/15/2024 noted cycles lasting 7 days, off for 1 to 2 weeks then recurrence of bleeding for 2 to 7 days after that.  This cycle has been going on for a couple years by her report.  She was given laboratory sheet for testing, but did not get this done.  Sonohysterogram and endometrial biopsy were done today, no focal findings were noted in the endometrium.  We will contact her with endometrial biopsy report.  She will call return with continued menometrorrhagia.  She states her last menses was almost a month ago and the bleeding does seem normal to cycle.  2. uterine fibroid-less than 1 cm anterior subserosal myoma was noted on ultrasound today.  No submucosal extension was appreciated.  Patient was counseled about this, would recommend no intervention at this time.  3.  Infertility-she is interested in evaluation.  She came last visit for this intended purpose.  She has been with her partner for 3 years, has not gotten pregnant.  I did give her the codes last visit and I encouraged her to check on this through her insurance.  She will plan to do so prior to next visit.  4.  History of right MCA stroke with left spastic hemiplegia/spastic gait-happened on 8/4/2020.  Was initially treated with Eliquis no longer on any medications.  Was to have seen neurology August 2024, I do not see any documentation of this.  We will check with patient at next visit.  5.  Factor V Leiden mutation-noted in her chart.  Follow-up as per hematology.  6.  Lack of contraception-now attempting pregnancy.  Did start vitamin with folic acid.  Should she get pregnant, would need management with OB and MFM given history of stroke and factor V.  7.  Other-has not had yearly exam for some time.  To return in the next few months for yearly exam with Pap test.      Subjective   Patient ID:  Shanelle Robledo is a 34 y.o. female.    There were no vitals filed for this visit.  Seen today for sonohysterogram with endometrial biopsy.  Please see assessment plan and procedure note for details.        The following portions of the patient's history were reviewed and updated as appropriate: allergies, current medications, past family history, past medical history, past social history, past surgical history, and problem list.  Past Medical History:   Diagnosis Date    Anemia 2015    Bronchitis     COVID-19 virus RNA detected 2020    Elevated troponin 2020    Embolus (HCC)     Family history of hypercoagulable state 10/03/2020    Last Assessment & Plan:   Patient's half sister with history of arterial embolism.  Question yet undiagnosed familial cause of hypercoaguability.  Continue eliquis for now for stroke prevention while undergoing workup for cardiac arrhythmias.  Formal hematology evaluation for their review and comments regarding long term anticoagulation in this patient.      Hypertension     Lymphadenopathy 10/31/2014    Stroke (cerebrum) (HCC)      Past Surgical History:   Procedure Laterality Date    TONSILLECTOMY  2013     OB History    Para Term  AB Living   0 0 0 0 0 0   SAB IAB Ectopic Multiple Live Births   0 0 0 0 0       Current Outpatient Medications:     apixaban (ELIQUIS) 2.5 mg, Take 1 tablet (2.5 mg total) by mouth 2 (two) times a day, Disp: 180 tablet, Rfl: 3    Botox 100 units, , Disp: , Rfl:     cetirizine (ZyrTEC) 10 mg tablet, Take 1 tablet (10 mg total) by mouth daily (Patient not taking: Reported on 8/15/2024), Disp: 90 tablet, Rfl: 0    traZODone (DESYREL) 100 mg tablet, TAKE 1 TABLET(100 MG) BY MOUTH DAILY AT BEDTIME, Disp: 90 tablet, Rfl: 1  Allergies   Allergen Reactions    Azithromycin Anaphylaxis     Social History     Socioeconomic History    Marital status: Single     Spouse name: Not on file    Number of children: Not on file    Years of  education: Not on file    Highest education level: Not on file   Occupational History    Not on file   Tobacco Use    Smoking status: Every Day     Current packs/day: 0.00     Types: Cigarettes     Last attempt to quit: 3/31/2021     Years since quitting: 3.4    Smokeless tobacco: Never    Tobacco comments:     2 cigarettes per day   Vaping Use    Vaping status: Never Used   Substance and Sexual Activity    Alcohol use: Not Currently    Drug use: No    Sexual activity: Not Currently   Other Topics Concern    Not on file   Social History Narrative    Not on file     Social Determinants of Health     Financial Resource Strain: Not on file   Food Insecurity: Food Insecurity Present (6/5/2024)    Hunger Vital Sign     Worried About Running Out of Food in the Last Year: Often true     Ran Out of Food in the Last Year: Often true   Transportation Needs: Unmet Transportation Needs (6/5/2024)    PRAPARE - Transportation     Lack of Transportation (Medical): Yes     Lack of Transportation (Non-Medical): Yes   Physical Activity: Not on file   Stress: Not on file   Social Connections: Not on file   Intimate Partner Violence: Not on file   Housing Stability: Unknown (6/5/2024)    Housing Stability Vital Sign     Unable to Pay for Housing in the Last Year: No     Number of Times Moved in the Last Year: Not on file     Homeless in the Last Year: No     Family History   Problem Relation Age of Onset    Hypertension Mother     Diabetes Maternal Grandfather        Review of Systems   Constitutional:  Negative for chills, diaphoresis, fatigue and fever.   Respiratory:  Negative for apnea, cough, chest tightness, shortness of breath and wheezing.    Cardiovascular:  Negative for chest pain, palpitations and leg swelling.   Gastrointestinal:  Negative for abdominal distention, abdominal pain, anal bleeding, constipation, diarrhea, nausea, rectal pain and vomiting.   Genitourinary:  Positive for menstrual problem. Negative for  difficulty urinating, dyspareunia, dysuria, frequency, hematuria, pelvic pain, urgency, vaginal bleeding, vaginal discharge and vaginal pain.   Musculoskeletal:  Negative for arthralgias, back pain and myalgias.   Skin:  Negative for color change and rash.   Neurological:  Negative for dizziness, syncope, light-headedness, numbness and headaches.   Hematological:  Negative for adenopathy. Does not bruise/bleed easily.   Psychiatric/Behavioral:  Negative for dysphoric mood and sleep disturbance. The patient is not nervous/anxious.        Objective   Physical Exam  OBGyn Exam     Objective      There were no vitals taken for this visit.    General:   alert and oriented, in no acute distress   Neck:    Breast:    Heart:    Lungs:    Abdomen: soft, non-tender, without masses or organomegaly   Vulva: normal   Vagina: Without erythema or lesions or discharge.  Normal   Cervix: Without lesions or discharge or cervicitis.  No Cervical motion tenderness   Uterus: top normal size, mid-position, non-tender   Adnexa: no mass, fullness, tenderness   Rectum: deferred    Psych:  Normal mood and affect   Skin:  Without obvious lesions   Eyes: symmetric, with normal movements and reactivity   Musculoskeletal:  Normal muscle tone and movements appreciated

## 2024-09-19 NOTE — TELEPHONE ENCOUNTER
Called Pt in regards to referral received, in attempts to verify needs of services and advised of current wait list. Pt is interested in talk therapy services and would like to be added to proper wait list.     Closing referral

## 2024-09-19 NOTE — PROGRESS NOTES
AMB US Pelvic Non OB    Date/Time: 9/19/2024 3:45 PM    Performed by: Magda Multani  Authorized by: Surinder Inman MD  Universal Protocol:  Consent: Verbal consent obtained.  Consent given by: patient  Timeout called at: 9/19/2024 4:09 PM.  Patient understanding: patient states understanding of the procedure being performed  Patient identity confirmed: verbally with patient    Procedure details:     SIS Procedure: Yes    Indications: non-obstetric vaginal bleeding      Technique:  Transvaginal US, Non-OB    Position: lithotomy exam    Uterine findings:     Length (cm): 7.66    Height (cm):  3.87    Width (cm):  4.33    Endometrial stripe: identified      Endometrium thickness (mm):  10.5  Left ovary findings:     Left ovary:  Visualized    Length (cm): 4.31    Height (cm): 1.91    Width (cm): 3.28  Right ovary findings:     Right ovary:  Visualized    Length (cm): 4.09    Height (cm): 2.34    Width (cm): 2.17  Other findings:     Free pelvic fluid: not identified      Free peritoneal fluid: not identified    Post-Procedure Details:     Impression:  Anteverted uterus demonstrates a less than 1cm anterior subserosal fibroid, otherwise the uterus and bilateral ovaries appear within normal limits. No free fluid.     Tolerance:  Tolerated well, no immediate complications  Additional Procedure Comments:      [employer] F8 E8C-RS transvaginal transducer Serial # [id number] was used to perform the examination today and subsequently followed with high level disinfection utilizing Trophon EPR procedure.     Ultrasound performed at:     [facility]  [address] [place] [address]  Phone:  [phone]  Fax:  [phone]    Sonohysterogram    Date/Time: 9/19/2024 3:45 PM    Performed by: Surinder Inman MD  Authorized by: Surinder Inman MD  Universal Protocol:  Consent: Verbal consent obtained.  Consent given by: patient  Timeout called at: 9/19/2024 4:09 PM.  Patient understanding: patient states understanding of  the procedure being performed  Patient identity confirmed: verbally with patient    Pre-procedure:     Prepped with: Hibiclens    Procedure:     Cervix cleaned and prepped: yes      Tenaculum applied to cervix: yes      Uterus sounded: yes      Catheter inserted: yes      Uterine cavity distended with saline: yes    Post-procedure:     Patient observed: yes      Post procedure instructions given to patient: yes      Patient tolerated procedure well with no complications: yes    Comments:      Sonohysterogram demonstrates a smooth appearing endometrium.   Endometrial biopsy    Date/Time: 9/19/2024 3:45 PM    Performed by: Surinder Inman MD  Authorized by: Surinder Inman MD  Universal Protocol:  Consent: Verbal consent obtained.  Consent given by: patient  Timeout called at: 9/19/2024 4:09 PM.  Patient understanding: patient states understanding of the procedure being performed  Patient identity confirmed: verbally with patient    Indication:     Indications: Other disorder of menstruation and other abnormal bleeding from female genital tract    Procedure:     Procedure: endometrial biopsy with Pipelle      Specimen collected: specimen collected and sent to pathology      Patient tolerated procedure well with no complications: yes

## 2024-09-24 PROCEDURE — 88305 TISSUE EXAM BY PATHOLOGIST: CPT | Performed by: PATHOLOGY

## 2024-09-24 NOTE — RESULT ENCOUNTER NOTE
Endometrial biopsy is benign.  No intervention is recommended.  To follow-up as scheduled for yearly exam in the next few months.

## 2024-09-30 ENCOUNTER — TELEPHONE (OUTPATIENT)
Dept: FAMILY MEDICINE CLINIC | Facility: CLINIC | Age: 34
End: 2024-09-30

## 2024-09-30 ENCOUNTER — TELEPHONE (OUTPATIENT)
Age: 34
End: 2024-09-30

## 2024-09-30 ENCOUNTER — OFFICE VISIT (OUTPATIENT)
Dept: FAMILY MEDICINE CLINIC | Facility: CLINIC | Age: 34
End: 2024-09-30
Payer: COMMERCIAL

## 2024-09-30 VITALS
DIASTOLIC BLOOD PRESSURE: 90 MMHG | BODY MASS INDEX: 23.6 KG/M2 | SYSTOLIC BLOOD PRESSURE: 124 MMHG | HEART RATE: 98 BPM | OXYGEN SATURATION: 98 % | HEIGHT: 63 IN | TEMPERATURE: 98.2 F | WEIGHT: 133.2 LBS

## 2024-09-30 DIAGNOSIS — F33.9 DEPRESSION, RECURRENT (HCC): ICD-10-CM

## 2024-09-30 DIAGNOSIS — F41.9 ANXIETY AND DEPRESSION: Primary | ICD-10-CM

## 2024-09-30 DIAGNOSIS — Z13.31 POSITIVE DEPRESSION SCREENING: ICD-10-CM

## 2024-09-30 DIAGNOSIS — Z59.819 HOUSING INSTABILITY: ICD-10-CM

## 2024-09-30 DIAGNOSIS — F32.A ANXIETY AND DEPRESSION: Primary | ICD-10-CM

## 2024-09-30 PROCEDURE — 99213 OFFICE O/P EST LOW 20 MIN: CPT

## 2024-09-30 SDOH — ECONOMIC STABILITY - HOUSING INSECURITY: HOUSING INSTABILITY UNSPECIFIED: Z59.819

## 2024-09-30 NOTE — TELEPHONE ENCOUNTER
Spoke with patient   seen 9/30/2024 by Kallie - patient scheduled another appt for paperwork 10/1/2024   per Kallie - patient can just drop off paperwork -- no need for another appointment  will call patient when paperwork is ready

## 2024-09-30 NOTE — ASSESSMENT & PLAN NOTE
Neurosurgical Spine Progress Note      Chief Complaint   Patient presents with   • Pain   • Video Visit   • Back Problem       HPI:  Usha Gerard is a 63 year old female who presents for follow up s/p laminoforaminotomy.      This visit is being performed virtually via Video visit using [employer].   Clinical Location: [facility]  Sherry's location Home and is physically present in   the [facility] at the time of this visit.       Diagnosis:  Patient Active Problem List   Diagnosis   • Essential hypertension   • Hypercholesterolemia   • Moderate single current episode of major depressive disorder  (CMD)   • Alcohol abuse   • Tobacco dependence   • Chronic renal disease, stage III  (CMD)   • Piriformis syndrome of right side   • Chronic bilateral low back pain without sciatica   • Hepatitis C virus infection without hepatic coma   • Pulmonary nodule, left   • Dilated cbd, acquired   • Alcohol-induced chronic pancreatitis  (CMD)   • MVP (mitral valve prolapse)   • COPD, mild  (CMD)   • Peripheral vascular disease (CMD)   • CAD in native artery   • S/P CABG x 3   • Stenosis of celiac artery (CMD)   • Weakness of both lower extremities   • Generalized weakness   • History of alcoholism  (CMD)   • Cardiac arrest, cause unspecified  (CMD)   • Seizures  (CMD)   • History of CVA (cerebrovascular accident)   • Memory loss   • Hallucinations   • Morning headache   • Recurrent seizures  (CMD)   • Arthritis   • Gastritis   • Elevated liver enzymes   • Epigastric pain   • Interstitial nephritis   • Vomiting   • Stage 3 chronic kidney disease  (CMD)   • Chronic hepatitis C virus infection  (CMD)   • Lumbar radiculopathy       REVIEW OF SYSTEMS:  12 point review of systems completed and all negative, except as noted in HPI.    Symptoms are:   []  Constant  []  Comes and goes      Type of pain:  []  Sharp  []  Dull  []  Throbbing  []  Ache  []  Shooting  []  Burning  []  Numbness/Tingling    See anxiety and depression                   Interferes with:  []  Sitting  []  Standing  []  Bending  []  Walking  []  Sleeping  []  Work  []  Recreation             Treatments tried:  []  Medication  []  Exercise  []  Physical Therapy  []  Chiropractor  []  Acupuncture  []  Steroid Injections  []  Surgery     Past Medical History:   Diagnosis Date   • Abnormal CT of brain 2022    Shows old strokes-referred to Neurology.   • Alcohol abuse 2018   • Arthritis    • Bilateral serous otitis media 2022   • Cerebral infarction  (CMD)    • Cerebrovascular accident (CVA), unspecified mechanism  (CMD) 2022   • Chronic pain    • CKD (chronic kidney disease), stage III  (CMD)    • Essential (primary) hypertension    • Essential hypertension 2018   • Gastroesophageal reflux disease    • Hepatitis C 10/2018   • Hypercholesteremia 2018   • Interstitial nephritis    • Moderate single current episode of major depressive disorder  (CMD) 2018   • MVP (mitral valve prolapse) 2021    moderate   • Non-STEMI (non-ST elevated myocardial infarction)  (CMD) 2022   • NSTEMI (non-ST elevated myocardial infarction)  (CMD)    • Piriformis syndrome of right side 2019   • PONV (postoperative nausea and vomiting)    • Pseudoaneurysm (CMD) 10/25/2022   • Pseudoaneurysm of femoral artery (CMD) 2022   • S/P CABG x 3 2022   • Seizures  (CMD)    • Tobacco dependence 2018     Past Surgical History:   Procedure Laterality Date   • Cardiac catherization     • Cardiac surgery  2022    S/p emergent pump-assisted myocardial revascularization x 3  LIMA -> LAD, SVG -> M1 and SVG -> PDA, placement of intra-aortic balloon pump in left femoral artery   •  section, classic     •  section, low transverse      x 5   • Colonoscopy  2018    3 adenomas removed, repeat in    • Ercp  2021   • Femoral exploration Right 2022    Exploration of right femoral artery with primary repair of  superficial femoral arterial pseudoaneurysms   • Pseudoaneurysm repair Left 08/30/2022    Left femoral artery pseudoaneurysm repair (Dr. Alejandre)   • Temporal artery ligatn or bx  06/04/2021    negative     ALLERGIES:   Allergen Reactions   • Floxin Otic Other (See Comments)     Throat itching, ear fullness     Current Outpatient Medications   Medication Sig Dispense Refill   • levETIRAcetam (KepPRA) 500 MG tablet Take 1 tablet by mouth every 12 hours. 180 tablet 0   • amoxicillin (AMOXIL) 875 MG tablet Take 1 tablet by mouth in the morning and 1 tablet in the evening. Do all this for 7 days. Begin taking on August 26, 2024. 14 tablet 0   • HYDROcodone-acetaminophen (Norco) 5-325 MG per tablet Take 1-2 tablets by mouth every 6 hours as needed for Pain. Begin taking on August 26, 2024. 25 tablet 0   • loratadine (CLARITIN) 10 MG tablet Take 1 tablet by mouth daily. 30 tablet 5   • atorvastatin (LIPITOR) 80 MG tablet Take 1 tablet by mouth nightly. 90 tablet 1   • metoPROLOL succinate (TOPROL-XL) 50 MG 24 hr tablet Take 1 tablet by mouth daily. 90 tablet 0   • mirtazapine (REMERON) 7.5 MG tablet Take 1 tablet by mouth nightly. 30 tablet 2   • pantoprazole (PROTONIX) 40 MG tablet Take 1 tablet by mouth daily. 30 tablet 2   • losartan (COZAAR) 25 MG tablet TAKE 1 TABLET BY MOUTH DAILY 90 tablet 0   • amLODIPine (NORVASC) 10 MG tablet Take 0.5 tablets by mouth daily. 45 tablet 1   • gabapentin (NEURONTIN) 300 MG capsule TAKE 1 CAPSULE BY MOUTH FOUR TIMES DAILY. DO NOT. START BEFORE MARCH 30, 2024 120 capsule 1   • Thiamine HCl (vitamin B-1) 250 MG tablet Take 250 mg by mouth daily.     • sertraline (ZOLOFT) 25 MG tablet TAKE 1 TABLET BY MOUTH EVERY DAY. TAKE WITH 100MG TABLET 30 tablet 2   • ferrous sulfate (FeroSul) 325 (65 FE) MG tablet Take 1 tablet by mouth daily. 90 tablet 1   • sertraline (ZOLOFT) 100 MG tablet TAKE 1 TABLET BY MOUTH EVERY DAY ALONG WITH ONE 25 TABLET EVERY DAY 30 tablet 5   • rivaroxaban (Xarelto)  15 MG Tab TAKE 1 TABLET BY MOUTH DAILY WITH DINNER 90 tablet 1   • acetaminophen (TYLENOL) 500 MG tablet Take 1,000 mg by mouth every 4 hours as needed for Pain.     • docusate sodium (COLACE) 100 MG capsule Take 100 mg by mouth daily as needed for Constipation.     • fluticasone (FLONASE) 50 MCG/ACT nasal spray Spray 2 sprays in each nostril daily as needed.     • diclofenac (Voltaren) 1 % gel Apply 2-4 grams to affected area every 8 hours as needed 2 g 1     No current facility-administered medications for this visit.      Family History   Problem Relation Age of Onset   • Hypertension Mother    • Diabetes Mother    • Diabetes Father    • Hypertension Father    • Alcohol Abuse Father    • Diabetes Sister    • Stroke Sister    • Systemic Lupus Erythematosus Son    • Seizure Disorder Son    • Asthma Niece    • Down Syndrome Nephew    • Allergic Rhinitis Neg Hx    • Angioedema Neg Hx    • Eczema Neg Hx    • Immunodeficiency Neg Hx    • Urticaria Neg Hx      Social History     Socioeconomic History   • Marital status: Single     Spouse name: Not on file   • Number of children: Not on file   • Years of education: Not on file   • Highest education level: Not on file   Occupational History   • Occupation: disability    Tobacco Use   • Smoking status: Former     Current packs/day: 0.00     Types: Cigarettes     Start date: 1980     Quit date: 2022     Years since quittin.1     Passive exposure: Never   • Smokeless tobacco: Never   Vaping Use   • Vaping status: Every Day   • Substances: Nicotine, THC   Substance and Sexual Activity   • Alcohol use: Yes     Comment: occasional   • Drug use: Yes     Types: Marijuana     Comment: hold 7 days pior to procedure   • Sexual activity: Not Currently     Comment: 8  kids   Other Topics Concern   • Not on file   Social History Narrative   • Not on file     Social Determinants of Health     Financial Resource Strain: Low Risk  (2024)    Financial Resource Strain     • Unable to Get: None   Food Insecurity: Low Risk  (6/12/2024)    Food Insecurity    • Worried about Food: Never true    • Food is Gone: Never true   Transportation Needs: Not At Risk (6/12/2024)    Transportation Needs    • Lack of Reliable Transportation: No   Physical Activity: Inactive (9/6/2022)    Exercise Vital Sign    • Days of Exercise per Week: 0 days    • Minutes of Exercise per Session: 0 min   Stress: Medium Risk (9/6/2022)    Stress    • How Stressed: Somewhat   Social Connections: Unknown (8/25/2024)    Received from [facility]    Social Connections    • Social Connections: Last Flowsheet Data: Not on file    • Social Connections: Recent Result: Not on file   Interpersonal Safety: Low Risk  (6/12/2024)    Interpersonal Safety    • How often physically hurt: Never    • How often insulted or talked down to: Never    • How often threatened with harm: Never    • How often scream or curse at: Never       PHYSICAL EXAM:  There were no vitals taken for this visit.  Video visit       IMAGING  MRI:  I have personally reviewed the radiological images and report when available.    None new  Encounter Diagnoses   Name Primary?   • Chronic bilateral low back pain without sciatica Yes       Doing well.  Incision well healed by report.  Will begin PT.      Romero Auguste MD, PhD, FAANS  Department of Neurosurgery  [facility]    T:  [phone]   F:  [phone]   M:  [phone]

## 2024-09-30 NOTE — TELEPHONE ENCOUNTER
Patient called and stated that since the ppwk given to her did not have the ICD codes and explanation of what it is they will not accept it.  She will need something in writing explain her Diagnosis with the ICD code on it.,  Please place in her Mychart.  She will need to have this by tomorrow, since tomorrow is the deadline.    Pls advise if we can not honor.

## 2024-09-30 NOTE — PROGRESS NOTES
Ambulatory Visit  Name: Shanelle Robledo      : 1990      MRN: [id number]  Encounter Provider: ZOE Gambino  Encounter Date: 2024   Encounter department: [facility]    Assessment & Plan  Anxiety and depression  PHQ9 Score 13 moderate depression   Previous medications: Sertraline   Does not want medications at this time   Emotional support provided    Depression Screening Follow-up Plan: Patient's depression screening was positive with a PHQ-9 score of 12. Patient assessed for underlying major depression. They have no active suicidal ideations. Brief counseling provided and recommend additional follow-up/re-evaluation next office visit.         Positive depression screening  See anxiety and depression        Housing instability  Previous referral placed to social work for assistance        Depression, recurrent (HCC)  Depression Screening Follow-up Plan: Patient's depression screening was positive with a PHQ-9 score of 12. Patient assessed for underlying major depression. They have no active suicidal ideations. Brief counseling provided and recommend additional follow-up/re-evaluation next office visit.           Depression Screening and Follow-up Plan: Patient's depression screening was positive with a PHQ-9 score of 12. Patient assessed for underlying major depression. Brief counseling provided and recommend additional follow-up/re-evaluation next office visit.       History of Present Illness     Anxiety  Presents for follow-up visit. Symptoms include decreased concentration, depressed mood, excessive worry, hyperventilation, insomnia, irritability, nervous/anxious behavior, obsessions and panic. Patient reports no chest pain, dizziness, nausea, palpitations, shortness of breath or suicidal ideas. The severity of symptoms is causing significant distress. The quality of sleep is poor. Nighttime awakenings: several.     Compliance with medications is 0-25%.  "      History obtained from : patient  Review of Systems   Constitutional:  Positive for irritability. Negative for activity change, chills, fatigue and fever.   HENT:  Negative for congestion, ear pain, rhinorrhea, sore throat and trouble swallowing.    Eyes:  Negative for pain and visual disturbance.   Respiratory:  Negative for cough, chest tightness and shortness of breath.    Cardiovascular:  Negative for chest pain, palpitations and leg swelling.   Gastrointestinal:  Negative for abdominal pain, constipation, diarrhea, nausea and vomiting.   Genitourinary:  Negative for difficulty urinating, dysuria, hematuria and urgency.   Musculoskeletal:  Negative for arthralgias and back pain.   Skin:  Negative for color change and rash.   Neurological:  Negative for dizziness, seizures, syncope and headaches.   Psychiatric/Behavioral:  Positive for decreased concentration. Negative for dysphoric mood and suicidal ideas. The patient is nervous/anxious and has insomnia.    All other systems reviewed and are negative.    Medical History Reviewed by provider this encounter:           Objective     /90 (BP Location: Left arm, Patient Position: Sitting, Cuff Size: Adult)   Pulse 98   Temp 98.2 °F (36.8 °C) (Temporal)   Ht 5' 3\" (1.6 m)   Wt 60.4 kg (133 lb 3.2 oz)   SpO2 98%   BMI 23.60 kg/m²     Physical Exam  Vitals and nursing note reviewed.   Constitutional:       General: She is not in acute distress.     Appearance: Normal appearance. She is well-developed and normal weight.   HENT:      Head: Normocephalic and atraumatic.      Right Ear: Tympanic membrane, ear canal and external ear normal. There is no impacted cerumen.      Left Ear: Tympanic membrane, ear canal and external ear normal. There is no impacted cerumen.      Nose: Nose normal.      Mouth/Throat:      Mouth: Mucous membranes are moist.      Pharynx: Oropharynx is clear.   Eyes:      Extraocular Movements: Extraocular movements intact.      " Conjunctiva/sclera: Conjunctivae normal.      Pupils: Pupils are equal, round, and reactive to light.   Cardiovascular:      Rate and Rhythm: Normal rate and regular rhythm.      Pulses: Normal pulses.      Heart sounds: Normal heart sounds. No murmur heard.  Pulmonary:      Effort: Pulmonary effort is normal. No respiratory distress.      Breath sounds: Normal breath sounds.   Abdominal:      General: Bowel sounds are normal.      Palpations: Abdomen is soft.      Tenderness: There is no abdominal tenderness.   Musculoskeletal:         General: Normal range of motion.      Cervical back: Normal range of motion and neck supple.      Right lower leg: No edema.      Left lower leg: No edema.   Skin:     General: Skin is warm and dry.      Capillary Refill: Capillary refill takes less than 2 seconds.   Neurological:      General: No focal deficit present.      Mental Status: She is alert and oriented to person, place, and time. Mental status is at baseline.   Psychiatric:         Mood and Affect: Mood normal.         Behavior: Behavior normal.         Thought Content: Thought content normal.         Judgment: Judgment normal.       Administrative Statements   I have spent a total time of 20 minutes in caring for this patient on the day of the visit/encounter including Diagnostic results, Prognosis, Risks and benefits of tx options, Instructions for management, Patient and family education, Importance of tx compliance, Risk factor reductions, Impressions, Counseling / Coordination of care, Documenting in the medical record, Reviewing / ordering tests, medicine, procedures  , and Obtaining or reviewing history  .    Depression Screening Follow-up Plan: Patient's depression screening was positive with a PHQ-2 score of . Their PHQ-9 score was 12. Patient assessed for underlying major depression. They have no active suicidal ideations. Brief counseling provided and recommend additional follow-up/re-evaluation next office  visit.

## 2024-09-30 NOTE — ASSESSMENT & PLAN NOTE
PHQ9 Score 13 moderate depression   Previous medications: Sertraline   Does not want medications at this time   Emotional support provided    Depression Screening Follow-up Plan: Patient's depression screening was positive with a PHQ-9 score of 12. Patient assessed for underlying major depression. They have no active suicidal ideations. Brief counseling provided and recommend additional follow-up/re-evaluation next office visit.

## 2024-09-30 NOTE — LETTER
September 30, 2024     Patient: Shanelle Robledo  YOB: 1990  Date of Visit: 9/30/2024      To Whom it May Concern:    Shanelle Robledo is under my professional care. Shanelle was seen in my office on 9/30/2024. Shanelle has an active mental illness diagnosis per chart review.  Please allow accommodations to be made to a support [place] in all of her needs.    If you have any questions or concerns, please don't hesitate to call.         Sincerely,          ZOE Gambino  Office visit on 09/30/2024 at 1200        CC: No Recipients

## 2024-12-09 ENCOUNTER — TELEPHONE (OUTPATIENT)
Dept: PERINATAL CARE | Facility: CLINIC | Age: 34
End: 2024-12-09

## 2024-12-09 NOTE — TELEPHONE ENCOUNTER
called patient to verify appointment type for preconception visit. patient was preparing to reschedule due to car trouble. was able to schedule visit as a virtual visit with Dr. Ross.

## 2024-12-10 ENCOUNTER — TELEMEDICINE (OUTPATIENT)
Dept: PERINATAL CARE | Facility: CLINIC | Age: 34
End: 2024-12-10
Payer: COMMERCIAL

## 2024-12-10 DIAGNOSIS — Z86.73 HISTORY OF ISCHEMIC RIGHT MCA STROKE: ICD-10-CM

## 2024-12-10 DIAGNOSIS — I10 HYPERTENSION, UNSPECIFIED TYPE: ICD-10-CM

## 2024-12-10 DIAGNOSIS — D68.51 HETEROZYGOUS FACTOR V LEIDEN MUTATION (HCC): Primary | ICD-10-CM

## 2024-12-10 DIAGNOSIS — F17.200 SMOKING: ICD-10-CM

## 2024-12-10 DIAGNOSIS — G81.14 SPASTIC HEMIPLEGIA AFFECTING LEFT NONDOMINANT SIDE, UNSPECIFIED ETIOLOGY (HCC): ICD-10-CM

## 2024-12-10 DIAGNOSIS — Z31.69 ENCOUNTER FOR PRECONCEPTION CONSULTATION: ICD-10-CM

## 2024-12-10 PROBLEM — IMO0001 SMOKING: Status: ACTIVE | Noted: 2024-12-10

## 2024-12-10 PROCEDURE — 99203 OFFICE O/P NEW LOW 30 MIN: CPT | Performed by: STUDENT IN AN ORGANIZED HEALTH CARE EDUCATION/TRAINING PROGRAM

## 2024-12-10 RX ORDER — FOLIC ACID 0.4 MG
400 TABLET ORAL DAILY
COMMUNITY

## 2024-12-10 NOTE — ASSESSMENT & PLAN NOTE
She smokes 2-3 cigarettes daily.  We discussed that tobacco use during pregnancy is associated with an increased risk for adverse pregnancy outcomes, including  cleft lip and palate, abnormal development of the fetal brain,  delivery, fetal growth restriction, abruptio placentae, stillbirth and SIDS. Vaping should not be used for tobacco cessation.  She was strongly encouraged to quit prior to pregnancy.

## 2024-12-10 NOTE — ASSESSMENT & PLAN NOTE
For any woman who wishes to become pregnant (or not preventing pregnancy), I recommend a daily prenatal vitamin with 400mcg of folate daily to reduce risk of neural tube defects. A healthy diet and exercise (150 minutes or more per week) are recommended. Use of alcohol, tobacco, and illicit substances should be avoided. I recommend that she be up-to-date on all preventive health, including pap smears and colonoscopy (if indicated). Vaccines should be up-to-date as well, including annual influenza vaccination. COVID-19 vaccination is recommended, and mRNA vaccines (Pfizer, Moderna) are favored in women of reproductive age. I recommend her immunity to vaccine-preventable illnesses including Varicella and Rubella be assessed by checking antibody titers (IgG), and that she receive boosters for any waning immunity.     Carrier screening for, Cystic Fibrosis, Spinal Muscular Atrophy, and a Hemoglobin Electrophoresis are recommended pre-conception to assess risk for future pregnancies. Carrier screening panels are also available, if desired. Genetic counseling is also available in our office prior to conception, if desired to understand risk of genetic syndromes, including aneuploidy and autosomal recessive conditions in a future pregnancy.

## 2024-12-10 NOTE — PROGRESS NOTES
PRECONCEPTION CONSULTATION: MATERNAL-FETAL MEDICINE     Virtual Regular Visit    Verification of patient location: Shanelle Robledo is located in the following state in which I hold an active license [place].    The patient was identified by name and date of birth.   She was informed that this is a telemedicine visit and that the visit is being conducted through the Epic Embedded platform. She agrees to proceed.    My office door was closed.   No one else was in the room.    She acknowledged consent and understanding of privacy and security of the platform, agrees to participate, and understands they can discontinue the visit at any time.    Patient is aware this is a billable service.     Assessment and Plan: Ms. Robledo is a 34 y.o. year-old  here for preconception counseling.  By issue:    Problem List Items Addressed This Visit       Hemiplegia affecting left nondominant side (HCC)    Relevant Orders    Ambulatory Referral to Neurology    Heterozygous factor V Leiden mutation (HCC) - Primary    History of ischemic right MCA stroke    We discussed her history of an acute ischemic right MCA stroke in .  She was out of the window for tPA so she was treated with mechanical thrombectomy.  She was placed on Eliquis following the event.  She had a thorough evaluation including echo, event monitor, and hypercoagulability workup.  Results were notable for normal DANIEL and event monitor (though she does have a loop recorder in place - unclear if functioning per  note) and she is heterozygous for factor V Leiden.  However, since there is no PFO, the role of her factor V Leiden mutation is reportedly unclear and the stroke is classified as cryptogenic.  She has ongoing left-sided pain, numbness, and weakness.  She is now able to ambulate without assistance though with some ongoing difficulty.  She is not currently enrolled in a rehabilitation program.  She does not follow with neurology.    We discussed pregnancy following  ischemic stroke.  Data on recurrence risk are sparse. One series estimated risk of recurrence during pregnancy to be 1.8% with pregnancy which is similar to recurrence risk outside of pregnancy.  Adverse pregnancy outcomes were similar to those of the general population. We discussed that though previous ischemic stroke is not a contraindication to pregnancy and recurrence risk is overall low, she will need close monitoring. She is well aware that if a stroke were to recur,the effects could be devastating.    As Shanelle has not had neurology follow-up in several years, I placed a referral for neurology at [facility] today. She reports taking Eliquis though chart documentation states spotty compliance.  Neurology input on whether ongoing Eliquis therapy is indicated would be appreciated.  Regardless, due to her history of ischemic stroke and known low risk thrombophilia, I recommend prophylactic anticoagulation with Lovenox through pregnancy and at least 6 weeks postpartum. Shanelle and I reviewed that if she does still need long-term anticoagulation, she can switch from Eliquis to Lovenox prior to pregnancy.  If ongoing anticoagulation outside of pregnancy is not indicated per neurology, then she can begin prophylactic Lovenox with her positive pregnancy test.         Relevant Medications    folic acid (FOLVITE) 400 mcg tablet    Other Relevant Orders    Ambulatory Referral to Neurology    Encounter for preconception consultation    For any woman who wishes to become pregnant (or not preventing pregnancy), I recommend a daily prenatal vitamin with 400mcg of folate daily to reduce risk of neural tube defects. A healthy diet and exercise (150 minutes or more per week) are recommended. Use of alcohol, tobacco, and illicit substances should be avoided. I recommend that she be up-to-date on all preventive health, including pap smears and colonoscopy (if indicated). Vaccines should be up-to-date as well, including annual influenza  vaccination. COVID-19 vaccination is recommended, and mRNA vaccines (Pfizer, Moderna) are favored in women of reproductive age. I recommend her immunity to vaccine-preventable illnesses including Varicella and Rubella be assessed by checking antibody titers (IgG), and that she receive boosters for any waning immunity.     Carrier screening for, Cystic Fibrosis, Spinal Muscular Atrophy, and a Hemoglobin Electrophoresis are recommended pre-conception to assess risk for future pregnancies. Carrier screening panels are also available, if desired. Genetic counseling is also available in our office prior to conception, if desired to understand risk of genetic syndromes, including aneuploidy and autosomal recessive conditions in a future pregnancy.          Hypertension    There is a diagnosis of hypertension listed in Coral's chart and recent office blood pressure was noted to be 124/90.  She believes she was on medication for this at one point but is unable to remember the name.  She is not currently on medication.  We reviewed risks for fetal growth restriction superimposed preeclampsia in pregnancy.  In pregnancy, she should be prescribed baby aspirin (162mg) until 36 weeks gestation unless she has a contraindication. Per the recently published CHAPS trial, goal blood pressure during pregnancy is <140/90, with labetalol or nifedipine XL as preferred first line antihypertensive agents.  Should she require medication initiation prior to pregnancy, use of one of these agents is preferred.  I recommend avoiding ACE inhibitors and angiotensin receptor blockers.         Smoking    She smokes 2-3 cigarettes daily.  We discussed that tobacco use during pregnancy is associated with an increased risk for adverse pregnancy outcomes, including  cleft lip and palate, abnormal development of the fetal brain,  delivery, fetal growth restriction, abruptio placentae, stillbirth and SIDS. Vaping should not be used for tobacco  cessation.  She was strongly encouraged to quit prior to pregnancy.                Referring physician:   Surinder Inman Md  [address] [place] [id number]    Reason for consultation: preconception    Dear Dr. Inman,    Thank you for referring patient Shanelle Robledo for preconception Maternal-Fetal Medicine consultation.  Her history is as follows:    Past Medical History:   Diagnosis Date    Anemia 2015    Bronchitis     COVID-19 virus RNA detected 2020    Elevated troponin 2020    Embolus (HCC)     Family history of hypercoagulable state 10/03/2020    Last Assessment & Plan:   Patient's half sister with history of arterial embolism.  Question yet undiagnosed familial cause of hypercoaguability.  Continue eliquis for now for stroke prevention while undergoing workup for cardiac arrhythmias.  Formal hematology evaluation for their review and comments regarding long term anticoagulation in this patient.      Hypertension     Lymphadenopathy 10/31/2014    Stroke (cerebrum) (HCC)          Past Surgical History:   Procedure Laterality Date    TONSILLECTOMY  2013       OB History    Para Term  AB Living   0 0 0 0 0 0   SAB IAB Ectopic Multiple Live Births   0 0 0 0 0     Gynecologic history: denies endometriosis, abnormal paps, infections; has one small fibroid, undergoing evaluation for irregular menses/AUB    Social History     Tobacco Use    Smoking status: Every Day     Current packs/day: 0.00     Types: Cigarettes     Last attempt to quit: 3/31/2021     Years since quitting: 3.6     Passive exposure: Current    Smokeless tobacco: Never    Tobacco comments:     2 cigarettes per day   Vaping Use    Vaping status: Never Used   Substance Use Topics    Alcohol use: Not Currently    Drug use: No       Family History   Problem Relation Age of Onset    Hypertension Mother     Diabetes Maternal Grandfather        Family history per our office screening tool includes diabetes and  hypertension in her mother and sister with VTE but is negative for birth defects, early-onset breast ovarian or colon cancer; Down syndrome or other chromosome problem, genetic condition or carrier state for cystic fibrosis, sickle cell, thalassemia, Yusuf-Sachs, or spinal muscular atrophy; hemophilia or von Willebrand's disease; preeclampsia      Current Outpatient Medications:     folic acid (FOLVITE) 400 mcg tablet, Take 400 mcg by mouth daily, Disp: , Rfl:     apixaban (ELIQUIS) 2.5 mg, Take 1 tablet (2.5 mg total) by mouth 2 (two) times a day, Disp: 180 tablet, Rfl: 3    Botox 100 units, , Disp: , Rfl:     Allergies   Allergen Reactions    Azithromycin Anaphylaxis       Occupation: owns a small business.  Spouse/Partner Name: Casper; Age 37; health status: overall healthy, had DVT.    Exam:  General: NAD  Remainder of exam deferred given virtual nature of visit      -------------------------    The time spent on this new patient on the encounter date included previsit service time of  10 minutes reviewing records and precharting, face-to-face (via virtual platform) service time of  19 minutes counseling regarding results and coordinating care, and post-service time of  10 minutes charting, totalling 39 minutes.    At the conclusion of today's encounter, all questions were answered to her satisfaction.  Thank you very much for this kind referral and please do not hesitate to contact me with any further questions or concerns.    Sincerely,    Jennyfer Ross MD  Attending Physician, Maternal-Fetal Medicine  [facility]

## 2024-12-10 NOTE — ASSESSMENT & PLAN NOTE
There is a diagnosis of hypertension listed in Shanelle's chart and recent office blood pressure was noted to be 124/90.  She believes she was on medication for this at one point but is unable to remember the name.  She is not currently on medication.  We reviewed risks for fetal growth restriction superimposed preeclampsia in pregnancy.  In pregnancy, she should be prescribed baby aspirin (162mg) until 36 weeks gestation unless she has a contraindication. Per the recently published CHAPS trial, goal blood pressure during pregnancy is <140/90, with labetalol or nifedipine XL as preferred first line antihypertensive agents.  Should she require medication initiation prior to pregnancy, use of one of these agents is preferred.  I recommend avoiding ACE inhibitors and angiotensin receptor blockers.

## 2024-12-10 NOTE — ASSESSMENT & PLAN NOTE
We discussed her history of an acute ischemic right MCA stroke in 2020.  She was out of the window for tPA so she was treated with mechanical thrombectomy.  She was placed on Eliquis following the event.  She had a thorough evaluation including echo, event monitor, and hypercoagulability workup.  Results were notable for normal DANIEL and event monitor (though she does have a loop recorder in place - unclear if functioning per 2023 note) and she is heterozygous for factor V Leiden.  However, since there is no PFO, the role of her factor V Leiden mutation is reportedly unclear and the stroke is classified as cryptogenic.  She has ongoing left-sided pain, numbness, and weakness.  She is now able to ambulate without assistance though with some ongoing difficulty.  She is not currently enrolled in a rehabilitation program.  She does not follow with neurology.    We discussed pregnancy following ischemic stroke.  Data on recurrence risk are sparse. One series estimated risk of recurrence during pregnancy to be 1.8% with pregnancy which is similar to recurrence risk outside of pregnancy.  Adverse pregnancy outcomes were similar to those of the general population. We discussed that though previous ischemic stroke is not a contraindication to pregnancy and recurrence risk is overall low, she will need close monitoring. She is well aware that if a stroke were to recur,the effects could be devastating.    As Shanelle has not had neurology follow-up in several years, I placed a referral for neurology at [facility] today. She reports taking Eliquis though chart documentation states spotty compliance.  Neurology input on whether ongoing Eliquis therapy is indicated would be appreciated.  Regardless, due to her history of ischemic stroke and known low risk thrombophilia, I recommend prophylactic anticoagulation with Lovenox through pregnancy and at least 6 weeks postpartum. Shanelle and I reviewed that if she does still need long-term  anticoagulation, she can switch from Eliquis to Lovenox prior to pregnancy.  If ongoing anticoagulation outside of pregnancy is not indicated per neurology, then she can begin prophylactic Lovenox with her positive pregnancy test.

## 2024-12-26 ENCOUNTER — APPOINTMENT (OUTPATIENT)
Dept: LAB | Facility: HOSPITAL | Age: 34
End: 2024-12-26
Payer: COMMERCIAL

## 2024-12-26 ENCOUNTER — RESULTS FOLLOW-UP (OUTPATIENT)
Dept: GYNECOLOGY | Facility: CLINIC | Age: 34
End: 2024-12-26

## 2024-12-26 DIAGNOSIS — B35.1 ONYCHOMYCOSIS: ICD-10-CM

## 2024-12-26 DIAGNOSIS — Z86.73 HISTORY OF ISCHEMIC RIGHT MCA STROKE: ICD-10-CM

## 2024-12-26 DIAGNOSIS — D68.51 HETEROZYGOUS FACTOR V LEIDEN MUTATION (HCC): ICD-10-CM

## 2024-12-26 DIAGNOSIS — N92.6 IRREGULAR MENSES: ICD-10-CM

## 2024-12-26 DIAGNOSIS — N94.89 OTHER SPECIFIED CONDITIONS ASSOCIATED WITH FEMALE GENITAL ORGANS AND MENSTRUAL CYCLE: ICD-10-CM

## 2024-12-26 DIAGNOSIS — Z13.6 SCREENING FOR CARDIOVASCULAR CONDITION: ICD-10-CM

## 2024-12-26 LAB
ALBUMIN SERPL BCG-MCNC: 4.2 G/DL (ref 3.5–5)
ALP SERPL-CCNC: 71 U/L (ref 34–104)
ALT SERPL W P-5'-P-CCNC: 15 U/L (ref 7–52)
ANION GAP SERPL CALCULATED.3IONS-SCNC: 3 MMOL/L (ref 4–13)
AST SERPL W P-5'-P-CCNC: 13 U/L (ref 13–39)
B-HCG SERPL-ACNC: <0.6 MIU/ML (ref 0–5)
BASOPHILS # BLD AUTO: 0.03 THOUSANDS/ÂΜL (ref 0–0.1)
BASOPHILS NFR BLD AUTO: 0 % (ref 0–1)
BILIRUB DIRECT SERPL-MCNC: 0.1 MG/DL (ref 0–0.2)
BILIRUB SERPL-MCNC: 1 MG/DL (ref 0.2–1)
BUN SERPL-MCNC: 11 MG/DL (ref 5–25)
CALCIUM SERPL-MCNC: 9.5 MG/DL (ref 8.4–10.2)
CHLORIDE SERPL-SCNC: 107 MMOL/L (ref 96–108)
CHOLEST SERPL-MCNC: 158 MG/DL (ref ?–200)
CO2 SERPL-SCNC: 29 MMOL/L (ref 21–32)
CREAT SERPL-MCNC: 0.88 MG/DL (ref 0.6–1.3)
EOSINOPHIL # BLD AUTO: 0.14 THOUSAND/ÂΜL (ref 0–0.61)
EOSINOPHIL NFR BLD AUTO: 2 % (ref 0–6)
ERYTHROCYTE [DISTWIDTH] IN BLOOD BY AUTOMATED COUNT: 13.2 % (ref 11.6–15.1)
GFR SERPL CREATININE-BSD FRML MDRD: 85 ML/MIN/1.73SQ M
GLUCOSE P FAST SERPL-MCNC: 87 MG/DL (ref 65–99)
HCT VFR BLD AUTO: 47 % (ref 34.8–46.1)
HDLC SERPL-MCNC: 43 MG/DL
HGB BLD-MCNC: 15.3 G/DL (ref 11.5–15.4)
IMM GRANULOCYTES # BLD AUTO: 0.02 THOUSAND/UL (ref 0–0.2)
IMM GRANULOCYTES NFR BLD AUTO: 0 % (ref 0–2)
LDLC SERPL CALC-MCNC: 105 MG/DL (ref 0–100)
LYMPHOCYTES # BLD AUTO: 1.98 THOUSANDS/ÂΜL (ref 0.6–4.47)
LYMPHOCYTES NFR BLD AUTO: 27 % (ref 14–44)
MCH RBC QN AUTO: 29.5 PG (ref 26.8–34.3)
MCHC RBC AUTO-ENTMCNC: 32.6 G/DL (ref 31.4–37.4)
MCV RBC AUTO: 91 FL (ref 82–98)
MONOCYTES # BLD AUTO: 0.54 THOUSAND/ÂΜL (ref 0.17–1.22)
MONOCYTES NFR BLD AUTO: 7 % (ref 4–12)
NEUTROPHILS # BLD AUTO: 4.67 THOUSANDS/ÂΜL (ref 1.85–7.62)
NEUTS SEG NFR BLD AUTO: 64 % (ref 43–75)
NONHDLC SERPL-MCNC: 115 MG/DL
NRBC BLD AUTO-RTO: 0 /100 WBCS
PLATELET # BLD AUTO: 343 THOUSANDS/UL (ref 149–390)
PMV BLD AUTO: 9.3 FL (ref 8.9–12.7)
POTASSIUM SERPL-SCNC: 4.2 MMOL/L (ref 3.5–5.3)
PROLACTIN SERPL-MCNC: 15.26 NG/ML (ref 3.34–26.72)
PROT SERPL-MCNC: 7.3 G/DL (ref 6.4–8.4)
RBC # BLD AUTO: 5.19 MILLION/UL (ref 3.81–5.12)
SODIUM SERPL-SCNC: 139 MMOL/L (ref 135–147)
TRIGL SERPL-MCNC: 50 MG/DL (ref ?–150)
TSH SERPL DL<=0.05 MIU/L-ACNC: 0.76 UIU/ML (ref 0.45–4.5)
WBC # BLD AUTO: 7.38 THOUSAND/UL (ref 4.31–10.16)

## 2024-12-26 PROCEDURE — 85025 COMPLETE CBC W/AUTO DIFF WBC: CPT

## 2024-12-26 PROCEDURE — 84702 CHORIONIC GONADOTROPIN TEST: CPT

## 2024-12-26 PROCEDURE — 36415 COLL VENOUS BLD VENIPUNCTURE: CPT

## 2024-12-26 PROCEDURE — 80061 LIPID PANEL: CPT

## 2024-12-26 PROCEDURE — 84443 ASSAY THYROID STIM HORMONE: CPT

## 2024-12-26 PROCEDURE — 84146 ASSAY OF PROLACTIN: CPT

## 2024-12-26 PROCEDURE — 82248 BILIRUBIN DIRECT: CPT

## 2024-12-26 PROCEDURE — 80053 COMPREHEN METABOLIC PANEL: CPT

## 2025-04-24 ENCOUNTER — APPOINTMENT (EMERGENCY)
Dept: RADIOLOGY | Facility: HOSPITAL | Age: 35
End: 2025-04-24
Payer: COMMERCIAL

## 2025-04-24 ENCOUNTER — HOSPITAL ENCOUNTER (EMERGENCY)
Facility: HOSPITAL | Age: 35
Discharge: HOME | End: 2025-04-24
Attending: EMERGENCY MEDICINE
Payer: COMMERCIAL

## 2025-04-24 VITALS
RESPIRATION RATE: 18 BRPM | HEART RATE: 100 BPM | SYSTOLIC BLOOD PRESSURE: 137 MMHG | OXYGEN SATURATION: 100 % | TEMPERATURE: 98.1 F | BODY MASS INDEX: 20.25 KG/M2 | WEIGHT: 114.3 LBS | DIASTOLIC BLOOD PRESSURE: 83 MMHG

## 2025-04-24 DIAGNOSIS — S29.019A ACUTE THORACIC MYOFASCIAL STRAIN, INITIAL ENCOUNTER: Primary | ICD-10-CM

## 2025-04-24 PROCEDURE — 72072 X-RAY EXAM THORAC SPINE 3VWS: CPT

## 2025-04-24 PROCEDURE — 99284 EMERGENCY DEPT VISIT MOD MDM: CPT

## 2025-04-24 PROCEDURE — 99284 EMERGENCY DEPT VISIT MOD MDM: CPT | Performed by: EMERGENCY MEDICINE

## 2025-04-24 RX ORDER — CYCLOBENZAPRINE HCL 10 MG
10 TABLET ORAL ONCE
Status: COMPLETED | OUTPATIENT
Start: 2025-04-24 | End: 2025-04-24

## 2025-04-24 RX ORDER — ACETAMINOPHEN 500 MG
500 TABLET ORAL EVERY 4 HOURS PRN
Qty: 30 TABLET | Refills: 0 | Status: SHIPPED | OUTPATIENT
Start: 2025-04-24

## 2025-04-24 RX ORDER — CYCLOBENZAPRINE HCL 10 MG
10 TABLET ORAL 2 TIMES DAILY PRN
Qty: 20 TABLET | Refills: 0 | Status: SHIPPED | OUTPATIENT
Start: 2025-04-24 | End: 2025-05-01 | Stop reason: SDUPTHER

## 2025-04-24 RX ORDER — ACETAMINOPHEN 325 MG/1
650 TABLET ORAL ONCE
Status: COMPLETED | OUTPATIENT
Start: 2025-04-24 | End: 2025-04-24

## 2025-04-24 RX ADMIN — ACETAMINOPHEN 650 MG: 325 TABLET, FILM COATED ORAL at 09:30

## 2025-04-24 RX ADMIN — CYCLOBENZAPRINE 10 MG: 10 TABLET, FILM COATED ORAL at 09:30
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
## 2025-04-24 NOTE — Clinical Note
Shanelle Robledo was seen and treated in our emergency department on 4/24/2025.                Diagnosis:     Shanelle  may return to work on return date.    She may return on this date: 04/26/2025         If you have any questions or concerns, please don't hesitate to call.      Beni Higgins MD    ______________________________           _______________          _______________  Hospital Representative                              Date                                Time

## 2025-05-01 ENCOUNTER — OFFICE VISIT (OUTPATIENT)
Dept: FAMILY MEDICINE CLINIC | Facility: CLINIC | Age: 35
End: 2025-05-01
Payer: COMMERCIAL

## 2025-05-01 ENCOUNTER — TELEPHONE (OUTPATIENT)
Age: 35
End: 2025-05-01

## 2025-05-01 VITALS
HEIGHT: 63 IN | TEMPERATURE: 98.1 F | DIASTOLIC BLOOD PRESSURE: 84 MMHG | SYSTOLIC BLOOD PRESSURE: 116 MMHG | HEART RATE: 77 BPM | WEIGHT: 114.2 LBS | OXYGEN SATURATION: 100 % | BODY MASS INDEX: 20.23 KG/M2

## 2025-05-01 DIAGNOSIS — S29.019A ACUTE THORACIC MYOFASCIAL STRAIN, INITIAL ENCOUNTER: ICD-10-CM

## 2025-05-01 DIAGNOSIS — F41.9 ANXIETY AND DEPRESSION: ICD-10-CM

## 2025-05-01 DIAGNOSIS — Z91.148 NONCOMPLIANCE WITH MEDICATIONS: ICD-10-CM
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
## 2025-05-01 DIAGNOSIS — F32.A ANXIETY AND DEPRESSION: ICD-10-CM

## 2025-05-01 DIAGNOSIS — G81.14 SPASTIC HEMIPLEGIA AFFECTING LEFT NONDOMINANT SIDE, UNSPECIFIED ETIOLOGY (HCC): ICD-10-CM

## 2025-05-01 PROBLEM — G81.10 SPASTIC HEMIPLEGIA AFFECTING NONDOMINANT SIDE (HCC): Status: ACTIVE | Noted: 2020-08-05

## 2025-05-01 PROCEDURE — 99214 OFFICE O/P EST MOD 30 MIN: CPT

## 2025-05-01 RX ORDER — CYCLOBENZAPRINE HCL 10 MG
10 TABLET ORAL 2 TIMES DAILY PRN
Qty: 20 TABLET | Refills: 0 | Status: SHIPPED | OUTPATIENT
Start: 2025-05-01

## 2025-05-01 RX ORDER — CYCLOBENZAPRINE HCL 5 MG
5 TABLET ORAL 3 TIMES DAILY PRN
Qty: 30 TABLET | Refills: 0 | Status: CANCELLED | OUTPATIENT
Start: 2025-05-01

## 2025-05-01 NOTE — TELEPHONE ENCOUNTER
Contacted patient in regards to ASAP Referral  in attempts to verify patient's needs of services and add patient to proper wait list. spoke with patient whom stated she was going inside an [employer] and could not speak at the moment. Writer asked patient to call back at [phone] the number at her caller ID. Pt stated she will call back. Closing referral.

## 2025-05-01 NOTE — ASSESSMENT & PLAN NOTE
Patient is no longer taking the following medications due to financial constrictions   Eliquis   Trazodone   Amlodipine   Atorvastatin   Famotidine   Sertraline   Tramadol
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
## 2025-05-02 ENCOUNTER — TELEPHONE (OUTPATIENT)
Dept: PHYSICAL THERAPY | Facility: OTHER | Age: 35
End: 2025-05-02

## 2025-05-02 NOTE — TELEPHONE ENCOUNTER
"Nurse reached out to discuss the recent referral entered for  Comprehensive Spine program.    Message stated \"the person you are trying to reach is not accepting calls right now. Please try your call again later\".    This is the first attempt to contact the patient. Referral deferred for a f/u attempt per protocol.  "

## 2025-05-06 ENCOUNTER — OFFICE VISIT (OUTPATIENT)
Dept: FAMILY MEDICINE CLINIC | Facility: CLINIC | Age: 35
End: 2025-05-06
Payer: COMMERCIAL

## 2025-05-06 ENCOUNTER — RA CDI HCC (OUTPATIENT)
Dept: OTHER | Facility: HOSPITAL | Age: 35
End: 2025-05-06

## 2025-05-06 VITALS
OXYGEN SATURATION: 99 % | TEMPERATURE: 98 F | WEIGHT: 110.8 LBS | BODY MASS INDEX: 19.63 KG/M2 | HEART RATE: 100 BPM | HEIGHT: 63 IN | SYSTOLIC BLOOD PRESSURE: 148 MMHG | DIASTOLIC BLOOD PRESSURE: 100 MMHG

## 2025-05-06 DIAGNOSIS — F41.9 ANXIETY AND DEPRESSION: ICD-10-CM

## 2025-05-06 DIAGNOSIS — Z59.9 FINANCIAL DIFFICULTIES: Primary | ICD-10-CM

## 2025-05-06 DIAGNOSIS — F32.A ANXIETY AND DEPRESSION: ICD-10-CM

## 2025-05-06 PROCEDURE — 99213 OFFICE O/P EST LOW 20 MIN: CPT

## 2025-05-06 SDOH — ECONOMIC STABILITY - INCOME SECURITY: PROBLEM RELATED TO HOUSING AND ECONOMIC CIRCUMSTANCES, UNSPECIFIED: Z59.9

## 2025-05-06 NOTE — PROGRESS NOTES
HCC coding opportunities     I69.354     Chart Reviewed number of suggestions sent to Provider: 1     Patients Insurance     Medicare Insurance: [employer]age

## 2025-05-06 NOTE — PROGRESS NOTES
"Name: Shanelle Robledo      : 1990      MRN: [id number]  Encounter Provider: ZOE Gambino  Encounter Date: 2025   Encounter department: [facility]  :  Assessment & Plan  Financial difficulties    Orders:    Ambulatory Referral to Social Work Care Management Program; Future    Anxiety and depression  Current medications: None                 History of Present Illness   Follow up on anxiety and depression.       Review of Systems   Constitutional:  Positive for activity change. Negative for chills, fatigue and fever.   HENT:  Negative for congestion, ear pain, rhinorrhea, sore throat and trouble swallowing.    Eyes:  Negative for pain and visual disturbance.   Respiratory:  Negative for cough, chest tightness and shortness of breath.    Cardiovascular:  Negative for chest pain, palpitations and leg swelling.   Gastrointestinal:  Negative for abdominal pain, constipation, diarrhea, nausea and vomiting.   Genitourinary:  Negative for difficulty urinating, dysuria, hematuria and urgency.   Musculoskeletal:  Negative for arthralgias and back pain.   Skin:  Negative for color change and rash.   Neurological:  Negative for dizziness, seizures, syncope and headaches.   Psychiatric/Behavioral:  Positive for dysphoric mood. The patient is nervous/anxious.    All other systems reviewed and are negative.      Objective   /100 (BP Location: Left arm, Patient Position: Sitting, Cuff Size: Adult)   Pulse 100   Temp 98 °F (36.7 °C) (Temporal)   Ht 5' 3\" (1.6 m)   Wt 50.3 kg (110 lb 12.8 oz)   LMP 2025 (Approximate)   SpO2 99%   BMI 19.63 kg/m²      Physical Exam  Vitals and nursing note reviewed.   Constitutional:       General: She is not in acute distress.     Appearance: Normal appearance. She is well-developed and normal weight.   HENT:      Head: Normocephalic and atraumatic.      Right Ear: Tympanic membrane, ear canal and external ear normal. There is no " impacted cerumen.      Left Ear: Tympanic membrane, ear canal and external ear normal. There is no impacted cerumen.      Nose: Nose normal.      Mouth/Throat:      Mouth: Mucous membranes are moist.      Pharynx: Oropharynx is clear.   Eyes:      Extraocular Movements: Extraocular movements intact.      Conjunctiva/sclera: Conjunctivae normal.      Pupils: Pupils are equal, round, and reactive to light.   Cardiovascular:      Rate and Rhythm: Normal rate and regular rhythm.      Pulses: Normal pulses.      Heart sounds: Normal heart sounds. No murmur heard.  Pulmonary:      Effort: Pulmonary effort is normal. No respiratory distress.      Breath sounds: Normal breath sounds.   Abdominal:      General: Bowel sounds are normal.      Palpations: Abdomen is soft.      Tenderness: There is no abdominal tenderness.   Musculoskeletal:         General: Normal range of motion.      Cervical back: Normal range of motion and neck supple.      Right lower leg: No edema.      Left lower leg: No edema.   Skin:     General: Skin is warm and dry.      Capillary Refill: Capillary refill takes less than 2 seconds.   Neurological:      General: No focal deficit present.      Mental Status: She is alert and oriented to person, place, and time. Mental status is at baseline.   Psychiatric:         Mood and Affect: Mood normal.         Behavior: Behavior normal.         Thought Content: Thought content normal.         Judgment: Judgment normal.       Administrative Statements   I have spent a total time of 20 minutes in caring for this patient on the day of the visit/encounter including Diagnostic results, Prognosis, Risks and benefits of tx options, Instructions for management, Patient and family education, Importance of tx compliance, Risk factor reductions, Impressions, Counseling / Coordination of care, Documenting in the medical record, Reviewing/placing orders in the medical record (including tests, medications, and/or procedures),  and Obtaining or reviewing history  .

## 2025-05-07 NOTE — TELEPHONE ENCOUNTER
"Call placed to the patient per Comprehensive Spine Program referral.    \"the person you are trying to reach is not accepting calls right now. Please try your call again later\".     This is the 2nd attempt to reach the patient. Will defer referral per protocol.  "

## 2025-05-08 ENCOUNTER — PATIENT OUTREACH (OUTPATIENT)
Dept: CASE MANAGEMENT | Facility: OTHER | Age: 35
End: 2025-05-08

## 2025-05-08 NOTE — PROGRESS NOTES
RASHMI NESS received referral for patient from ZOE Gambino, for financial difficulties.     Patient has prior OP RASHMI NESS outreach for transportation where [facility] assisted with [employer] application, but patient then reported she had gotten a car and [employer] was no longer needed.    RASHMI NESS placed initial outreach call to patient. RASHMI NESS introduced self, explained role and reasoning for outreach. Patient stated that she is being evicted and she needs to be out of her current home by 5/14. RASHMI NESS asked if patient had any friends or family she could stay with and she stated she did not, but when RASHMI NESS asked what patient's plan was she stated that she did have a friend offer to let patient stay with her. Patient stated a concern she had was that she has a lot of belongings and animals. RASHMI NESS asked if this friend knew this and patient stated yes. RASHMI NESS stated that if patient's friend is offering to assist, this may be a good idea so that she has somewhere to go.    RASHMI NESS asked patient if she applied for housing at all. Patient stated she had tried years ago but was denied. RASHMI NESS offered to place [facility] referral to assist with housing application and patient was agreeable. RASHMI NESS did state that most housing options have wait-lists and patient was understanding. RASHMI NESS offered to sent patient a list of shelters and patient declined.    Patient did state that she had her review for disability and she was worried that she would be taken off of it as she stated that they said they feel patient is able to work. Patient stated that she had a stroke previously and still has many medical concerns and difficulties. Patient stated that she received the letter to appeal the decision, but that it is now after the ten day time frame and patient stated she needs to submit information to receive a 60-day extension, which is why she brought the paperwork to the PCP office, but patient stated she was not sure why referral was placed to RASHMI NESS for this.  RASHMI NESS stated RASHMI NESS was not sure as it was not noted in the referral.    Patient reported no other needs at this time. RASHMI NESS enrolled patient in program, added self to care team and added [facility] to Supports and Services.

## 2025-05-09 ENCOUNTER — PATIENT OUTREACH (OUTPATIENT)
Dept: CASE MANAGEMENT | Facility: OTHER | Age: 35
End: 2025-05-09

## 2025-05-09 NOTE — PROGRESS NOTES
Care management  requested to be added to patient care plan via in basket message sent to Rachel CARABALLO.    Cmoc called patient unable to leave message due to voicemail being full.    Cmoc will outreach patient again next week regarding referral for housing concern.    No other concerns at this time.

## 2025-05-13 ENCOUNTER — PATIENT OUTREACH (OUTPATIENT)
Dept: CASE MANAGEMENT | Facility: OTHER | Age: 35
End: 2025-05-13

## 2025-05-13 NOTE — PROGRESS NOTES
Cmoc called patient left detailed message regarding referral received regarding housing concerns.    Cmoc also attempted to outreach 5/9 unable to leave message due to voicemail being full.    Cmoc will outreach again next week, if unable to reach referral will be closed.    No other concerns at this time.    ADDENDUM: Cmoc received call back from patient.    Cmoc attempted to assist patient with applying online for [facility] internet wasn't  allowing cmoc. Patient states that she has the same issue.    Patient mentioned that she has a printer and oc emailed patient application.     Patient states that she will print out and inform [facility] when its completed.    Patient denies having car, [facility] offered to schedule home visit for 5/15 to  application. Patient agreed.    [facility] also sent patient other housing resources via email along with resources from [employer].    Patient thanked [facility].

## 2025-05-15 ENCOUNTER — PATIENT OUTREACH (OUTPATIENT)
Dept: CASE MANAGEMENT | Facility: OTHER | Age: 35
End: 2025-05-15

## 2025-05-15 NOTE — PROGRESS NOTES
Cmoc called patient left detailed message following up on housing concerns.    Cmoc emailed patient [facility] housing application per patient request.    Patient stated last encounter she had a printer was going to print application and fill it out.    Cmoc offered home visit for today to  application if needed.    Cmoc unable to reach patient will wait for call back to schedule home visit.    Cmoc will outreach again early next week.    No other concerns at this time.

## 2025-05-20 ENCOUNTER — PATIENT OUTREACH (OUTPATIENT)
Dept: CASE MANAGEMENT | Facility: OTHER | Age: 35
End: 2025-05-20

## 2025-05-22 ENCOUNTER — PATIENT OUTREACH (OUTPATIENT)
Dept: CASE MANAGEMENT | Facility: OTHER | Age: 35
End: 2025-05-22

## 2025-05-22 NOTE — PROGRESS NOTES
Cmoc called patient left detailed message 5/15, 5/20 and today 5/22.    Cmoc was assisting with housing concerns.    Cmoc was able to assist with [facility] application that was sent to patient via email.    Referral will be closed.    [facility] letter was generated.    No other concerns at this time.

## 2025-05-22 NOTE — LETTER
05/22/25    Dear Shanelle Robledo,    I am a Community Health Worker with [employer] [facility].  I have made several attempts to call you by phone.  It is important that you contact me back at [phone] so that I can assist with your care needs.     Sincerely,     GERARDO Brown

## 2025-05-22 NOTE — PROGRESS NOTES
RASHMI NESS reviewed patient's chart. [facility]OC outreached to assist with housing applications/resources. Patient was emailed the [facility] Housing application and stated she would be able to print and would notify OC when completed. [facility] also offered home visit for 5/15 to pick-up application. Per chart, [facility] has outreached patient on 5/15 and 5/20 and voicemail's have been left. RASHMI CM to continue to follow at this time.

## 2025-05-23 ENCOUNTER — TELEPHONE (OUTPATIENT)
Dept: FAMILY MEDICINE CLINIC | Facility: CLINIC | Age: 35
End: 2025-05-23

## 2025-05-23 ENCOUNTER — PATIENT OUTREACH (OUTPATIENT)
Dept: CASE MANAGEMENT | Facility: OTHER | Age: 35
End: 2025-05-23

## 2025-05-23 NOTE — PROGRESS NOTES
RASHMI NESS received routed note from [facility] on 5/22 that patient had been outreached on 5/15, 5/20 and 5/22 and voicemail's were left. Unable to Reach letter sent to patient on 5/22 and [facility] closed. RASHMI NESS closed program and removed self from care team.

## 2025-05-23 NOTE — TELEPHONE ENCOUNTER
Spoke with Shanelle  she will look online for paperwork that needs to be filled out by PCP for disability

## 2025-05-25 ENCOUNTER — HOSPITAL ENCOUNTER (EMERGENCY)
Facility: HOSPITAL | Age: 35
Discharge: HOME | End: 2025-05-25
Attending: EMERGENCY MEDICINE | Admitting: EMERGENCY MEDICINE
Payer: COMMERCIAL

## 2025-05-25 VITALS
WEIGHT: 112.21 LBS | TEMPERATURE: 97.6 F | DIASTOLIC BLOOD PRESSURE: 80 MMHG | RESPIRATION RATE: 18 BRPM | HEART RATE: 91 BPM | OXYGEN SATURATION: 99 % | BODY MASS INDEX: 19.88 KG/M2 | SYSTOLIC BLOOD PRESSURE: 128 MMHG

## 2025-05-25 DIAGNOSIS — D68.51 FACTOR V LEIDEN MUTATION (HCC): ICD-10-CM

## 2025-05-25 DIAGNOSIS — Z86.73 HISTORY OF ISCHEMIC RIGHT MCA STROKE: ICD-10-CM

## 2025-05-25 DIAGNOSIS — R26.81 GENERAL UNSTEADINESS: ICD-10-CM

## 2025-05-25 DIAGNOSIS — D68.51 HETEROZYGOUS FACTOR V LEIDEN MUTATION (HCC): ICD-10-CM

## 2025-05-25 DIAGNOSIS — F10.90 ALCOHOL USE: Primary | ICD-10-CM

## 2025-05-25 LAB
ATRIAL RATE: 95 BPM
GLUCOSE SERPL-MCNC: 116 MG/DL (ref 65–140)
P AXIS: 75 DEGREES
PR INTERVAL: 136 MS
QRS AXIS: 30 DEGREES
QRSD INTERVAL: 72 MS
QT INTERVAL: 344 MS
QTC INTERVAL: 432 MS
T WAVE AXIS: 73 DEGREES
VENTRICULAR RATE: 95 BPM

## 2025-05-25 PROCEDURE — 93005 ELECTROCARDIOGRAM TRACING: CPT

## 2025-05-25 PROCEDURE — 82948 REAGENT STRIP/BLOOD GLUCOSE: CPT

## 2025-05-25 PROCEDURE — 99284 EMERGENCY DEPT VISIT MOD MDM: CPT | Performed by: EMERGENCY MEDICINE

## 2025-05-25 PROCEDURE — 99284 EMERGENCY DEPT VISIT MOD MDM: CPT

## 2025-05-25 PROCEDURE — 93010 ELECTROCARDIOGRAM REPORT: CPT | Performed by: INTERNAL MEDICINE

## 2025-05-25 NOTE — ED PROVIDER NOTES
"Time reflects when diagnosis was documented in both MDM as applicable and the Disposition within this note       Time User Action Codes Description Comment    5/25/2025  3:10 AM FacSuzanna franklin Add [R26.81] General unsteadiness     5/25/2025  3:10 AM FacSuzanna franklin Add [F10.90] Alcohol use     5/25/2025  3:11 AM FacSuzanna franklin Add [Z86.73] History of ischemic right MCA stroke     5/25/2025  3:11 AM FacLinda franklinana Add [D68.51] Factor V Leiden mutation (HCC)     5/25/2025  3:11 AM FacchianoLindaSuzanna Modify [R26.81] General unsteadiness     5/25/2025  3:11 AM FacSuzanna franklin Modify [F10.90] Alcohol use     5/25/2025  3:20 AM FacLinda franklinana Add [D68.51] Heterozygous factor V Leiden mutation (HCC)           ED Disposition       ED Disposition   Discharge    Condition   Stable    Date/Time   Sun May 25, 2025  3:10 AM    Comment   Shanelle Robledo discharge to home/self care.                   Assessment & Plan       Medical Decision Making  Shanelle Robledo is a 35 y.o. who presents with complaints of feeling \"off balance\" while sitting in a chair after drinking alcohol this evening,     Vital signs are HD stable, afebrile    Ddx: suspect symptoms secondary to alcohol   Doubt CVA    Plan: fingerstick glucose WNL  EKG WNL  Patient remained HD stable and asymptomatic in the ED  Able to ambulate at baseline  Engaged in shared decision making with patient; offered CBC, BMP and imaging of head and neck for reassurance to rule out recurrent CVA, though doubt acute intracranial abnormality  Patient does not wish to have imaging or labwork done at this time  Referral to neurology provided for follow up   Discussed with heme/onc- patient okay to resume eliquis at prior dose. Would not need loading dose. Recommend follow up outpatient as she has not been evaluated by hematology previously   Referral to heme/onc provided  Refilled eliquis for 30 days  Recommend PCP follow up while waiting for neuro and heme/onc " appointments  Supportive care instructions and return precautions provided  Patient understands and is agreeable to plan    Disposition: patient stable for discharge home         Risk  Prescription drug management.             Medications - No data to display    ED Risk Strat Scores                    No data recorded                            History of Present Illness       Chief Complaint   Patient presents with    Dizziness     Pt reports ETOH tonight, lost her balance and felt unsteady.  Pt reports Hx of prior stroke and was concerned d/t unsteady gait.       Past Medical History[1]   Past Surgical History[2]   Family History[3]   Social History[4]   E-Cigarette/Vaping    E-Cigarette Use Never User       E-Cigarette/Vaping Substances    Nicotine No     THC No     CBD No     Flavoring No     Other No     Unknown No       I have reviewed and agree with the history as documented.     Patient is a 35-year-old female with pertinent past medical history of factor V Leiden mutation, noncompliance with Eliquis due to insurance issues, ischemic right MCA stroke, and spastic hemiplegia of left side. Patient states that she was having a mixed drink approximately 1 hour ago when she felt like she was off balance while sitting in a chair.  She denies headache, visual changes, lightheadedness, dizziness, slurred speech, facial droop, chest pain, shortness of breath, numbness, weakness, or tingling of extremities.  She states that she was able to get out of the chair by herself and walk independently at her baseline.  However, given history of stroke, she called 911.  Patient states that she is currently asymptomatic.  Denies any drug use this evening.  Denies any other symptoms at this time.        Review of Systems   All other systems reviewed and are negative.          Objective       ED Triage Vitals   Temperature Pulse Blood Pressure Respirations SpO2 Patient Position - Orthostatic VS   05/25/25 0126 05/25/25 0126  05/25/25 0126 05/25/25 0126 05/25/25 0126 --   97.6 °F (36.4 °C) 95 129/82 18 100 %       Temp Source Heart Rate Source BP Location FiO2 (%) Pain Score    05/25/25 0126 05/25/25 0200 -- -- 05/25/25 0126    Oral Monitor   No Pain      Vitals      Date and Time Temp Pulse SpO2 Resp BP Pain Score FACES Pain Rating User   05/25/25 0200 -- 91 99 % 18 128/80 No Pain -- BRB   05/25/25 0126 97.6 °F (36.4 °C) 95 100 % 18 129/82 No Pain -- BRB            Physical Exam  Constitutional:       General: She is not in acute distress.     Appearance: Normal appearance. She is not ill-appearing, toxic-appearing or diaphoretic.      Comments: Does not appear clinically intoxicated     HENT:      Head: Normocephalic and atraumatic.      Nose: Nose normal.      Mouth/Throat:      Mouth: Mucous membranes are moist.      Pharynx: Oropharynx is clear.     Eyes:      Extraocular Movements: Extraocular movements intact.      Conjunctiva/sclera: Conjunctivae normal.      Pupils: Pupils are equal, round, and reactive to light.       Cardiovascular:      Rate and Rhythm: Normal rate and regular rhythm.      Pulses: Normal pulses.      Heart sounds: Normal heart sounds.   Pulmonary:      Effort: Pulmonary effort is normal.     Musculoskeletal:         General: No swelling, tenderness, deformity or signs of injury. Normal range of motion.      Cervical back: Normal range of motion and neck supple.      Comments: Left hand in splint to prevent contracture        Skin:     General: Skin is warm and dry.      Capillary Refill: Capillary refill takes less than 2 seconds.     Neurological:      Mental Status: She is alert and oriented to person, place, and time.      Cranial Nerves: No cranial nerve deficit.      Sensory: No sensory deficit.      Coordination: Coordination normal.      Gait: Gait abnormal (patient endorses chronic limp on left).      Comments: Somewhat diminished strength of left upper and lower extremity, which patient states is  chronic after CVA  No dysarthria or aphasia     Psychiatric:         Mood and Affect: Mood normal.         Behavior: Behavior normal.         Results Reviewed       Procedure Component Value Units Date/Time    Fingerstick Glucose (POCT) [[id number]]  (Normal) Collected: 05/25/25 0130    Lab Status: Final result Specimen: Blood Updated: 05/25/25 0131     POC Glucose 116 mg/dl             No orders to display       ECG 12 Lead Documentation Only    Date/Time: 5/25/2025 4:53 AM    Performed by: Suzanna Dickson MD  Authorized by: Suzanna Dickson MD    Patient location:  ED  Rate:     ECG rate:  95    ECG rate assessment: normal    Rhythm:     Rhythm: sinus rhythm    Ectopy:     Ectopy: none    QRS:     QRS axis:  Normal    QRS intervals:  Normal  Conduction:     Conduction: normal    ST segments:     ST segments:  Normal  T waves:     T waves: normal        ED Medication and Procedure Management   Prior to Admission Medications   Prescriptions Last Dose Informant Patient Reported? Taking?   Botox 100 units   Yes No   acetaminophen (TYLENOL) 500 mg tablet   No No   Sig: Take 1 tablet (500 mg total) by mouth every 4 (four) hours as needed for mild pain, headaches or fever   Patient not taking: Reported on 5/6/2025   apixaban (ELIQUIS) 2.5 mg   No No   Sig: Take 1 tablet (2.5 mg total) by mouth 2 (two) times a day   Patient not taking: Reported on 5/1/2025   cyclobenzaprine (FLEXERIL) 10 mg tablet   No No   Sig: Take 1 tablet (10 mg total) by mouth 2 (two) times a day as needed for muscle spasms   folic acid (FOLVITE) 400 mcg tablet   Yes No   Sig: Take 400 mcg by mouth daily   Patient not taking: Reported on 5/6/2025      Facility-Administered Medications: None     Discharge Medication List as of 5/25/2025  3:11 AM        CONTINUE these medications which have NOT CHANGED    Details   acetaminophen (TYLENOL) 500 mg tablet Take 1 tablet (500 mg total) by mouth every 4 (four) hours as needed for mild pain, headaches or  fever, Starting Thu 4/24/2025, Normal      Botox 100 units Historical Med      cyclobenzaprine (FLEXERIL) 10 mg tablet Take 1 tablet (10 mg total) by mouth 2 (two) times a day as needed for muscle spasms, Starting Thu 5/1/2025, Normal      folic acid (FOLVITE) 400 mcg tablet Take 400 mcg by mouth daily, Historical Med      apixaban (ELIQUIS) 2.5 mg Take 1 tablet (2.5 mg total) by mouth 2 (two) times a day, Starting Wed 6/5/2024, Until Sat 5/31/2025, Normal             ED SEPSIS DOCUMENTATION   Time reflects when diagnosis was documented in both MDM as applicable and the Disposition within this note       Time User Action Codes Description Comment    5/25/2025  3:10 AM Suzanna Dickson Add [R26.81] General unsteadiness     5/25/2025  3:10 AM Suzanna Dickson Add [F10.90] Alcohol use     5/25/2025  3:11 AM Suzanna Dickson Add [Z86.73] History of ischemic right MCA stroke     5/25/2025  3:11 AM Suzanna Dickson Add [D68.51] Factor V Leiden mutation (HCC)     5/25/2025  3:11 AM Suzanna Dickson Modify [R26.81] General unsteadiness     5/25/2025  3:11 AM Suzanna Dickson Modify [F10.90] Alcohol use     5/25/2025  3:20 AM Suzanna Dickson Add [D68.51] Heterozygous factor V Leiden mutation (HCC)                    [1]   Past Medical History:  Diagnosis Date    Anemia 02/12/2015    Bronchitis     COVID-19 virus RNA detected 12/26/2020    Elevated troponin 08/05/2020    Embolus (HCC)     Family history of hypercoagulable state 10/03/2020    Last Assessment & Plan:   Patient's half sister with history of arterial embolism.  Question yet undiagnosed familial cause of hypercoaguability.  Continue eliquis for now for stroke prevention while undergoing workup for cardiac arrhythmias.  Formal hematology evaluation for their review and comments regarding long term anticoagulation in this patient.      Hypertension     Lymphadenopathy 10/31/2014    Stroke (cerebrum) (HCC)    [2]   Past Surgical History:  Procedure  Laterality Date    THROMBECTOMY      TONSILLECTOMY  09/01/2013   [3]   Family History  Problem Relation Name Age of Onset    Hypertension Mother      Diabetes Maternal Grandfather     [4]   Social History  Tobacco Use    Smoking status: Every Day     Current packs/day: 0.25     Types: Cigarettes     Passive exposure: Current    Smokeless tobacco: Never    Tobacco comments:     2 cigarettes per day   Vaping Use    Vaping status: Never Used   Substance Use Topics    Alcohol use: Yes    Drug use: No        Suzanna Dickson MD  05/25/25 [id number]

## 2025-05-25 NOTE — ED ATTENDING ATTESTATION
5/25/2025  I, Chepe Loyola Jr, DO, saw and evaluated the patient. I have discussed the patient with the resident/non-physician practitioner and agree with the resident's/non-physician practitioner's findings, Plan of Care, and MDM as documented in the resident's/non-physician practitioner's note, except where noted. All available labs and Radiology studies were reviewed.  I was present for key portions of any procedure(s) performed by the resident/non-physician practitioner and I was immediately available to provide assistance.       At this point I agree with the current assessment done in the Emergency Department.  I have conducted an independent evaluation of this patient a history and physical is as follows:    Final Diagnosis:  1. Alcohol use    2. General unsteadiness    3. History of ischemic right MCA stroke    4. Factor V Leiden mutation (HCC)            MDM       Patient is a 35-year-old female with a history of factor V Leyden with a right MCA stroke several years ago causing left arm spastic paralysis.  She is presenting tonight due to concerns for possible stroke.  She states that she did drink alcohol tonight, was sitting in a chair when she felt like she was leaning over to her left hand side.  She states that she got up and the symptoms went away.  At that time she had already called 911 so she proceeded to come here for evaluation.    Currently she has no symptoms.  She denies any recent trauma, headaches, slurred speech, weakness to her left arm or leg, chest pain, shortness of breath or any other symptoms at this time.    EKG done on arrival.  No acute ischemic changes or arrhythmia.  No prolonged QTc.    Patient ambulated here.  This was normal and at her baseline.  She states she still has no symptoms.  She had no ataxia, dizziness or any other complaints during ambulation.    Patient is noncompliant with her Eliquis.  She has not taken it in at least 3 months.  She states that she has had  financial difficulties which is why she is not taking Eliquis or even other medications.  She does however follow-up with her family doctor on a regular basis.  She has not followed up with hematology.  She does follow-up with neurology but has not yet this year.    At this time, the patient states she feels well and does not want any further workup done.  We did discuss with her at length about her symptoms, history and potential underlying CVA or TIA.  She understands the risk of her not taking her Eliquis that could cause further strokes given her history of factor V Leiden.    Given her normal neurologic exam at this time, no ataxia with ambulation and normal EKG I find that it is reasonable no further workup per her request.  She does have good outpatient follow-up in the next few days.  We will reach out to hematology to discuss restarting her Eliquis as she might need another loading dose.    Heme-Onc states that we don't have to restart her Eliquis due to the fact that she has heterozygous factor V Leiden and this would not be a risk factor for stroke.  They state that the stroke was cryptogenic and she needs to follow-up with neurology to discuss any further anticoagulation medications.  Patient follows with [facility].    Lab Results:   Abnormal Labs Reviewed - No abnormal labs to display  Lab Results: I have personally reviewed pertinent lab results.    Imaging:   No orders to display     I have personally reviewed pertinent reports.    EKG, Pathology, and Other Studies: I have personally reviewed pertinent films in PACS    Clinical Impression:    Final diagnoses:   General unsteadiness   Alcohol use   History of ischemic right MCA stroke   Factor V Leiden mutation (HCC)         Disposition    discharged           New Prescriptions:    New Prescriptions    No medications on file            Follow-up Instructions:    [facility]  [address]  [place] [id number] [phone]  Go to   If symptoms worsen    ZOE Gambino  [address] [place] [address]  [phone]    Schedule an appointment as soon as possible for a visit             History of Present Illness   Shanelle Robledo is a 35 y.o. female who presents with Dizziness (Pt reports ETOH tonight, lost her balance and felt unsteady.  Pt reports Hx of prior stroke and was concerned d/t unsteady gait.)    has a past medical history of Anemia (02/12/2015), Bronchitis, COVID-19 virus RNA detected (12/26/2020), Elevated troponin (08/05/2020), Embolus (HCC), Family history of hypercoagulable state (10/03/2020), Hypertension, Lymphadenopathy (10/31/2014), and Stroke (cerebrum) ([facility])..         Objective     Vitals:    05/25/25 0126 05/25/25 0200   BP: 129/82 128/80   Pulse: 95 91   Resp: 18 18   Temp: 97.6 °F (36.4 °C)    TempSrc: Oral    SpO2: 100% 99%   Weight: 50.9 kg (112 lb 3.4 oz)      Body mass index is 19.88 kg/m².  No intake or output data in the 24 hours ending 05/25/25 0313  Invasive Devices       None                   ED Course         Critical Care Time  Procedures

## 2025-05-29 ENCOUNTER — OFFICE VISIT (OUTPATIENT)
Dept: FAMILY MEDICINE CLINIC | Facility: CLINIC | Age: 35
End: 2025-05-29
Payer: COMMERCIAL

## 2025-05-29 VITALS
BODY MASS INDEX: 21.02 KG/M2 | HEIGHT: 63 IN | HEART RATE: 99 BPM | TEMPERATURE: 98.2 F | DIASTOLIC BLOOD PRESSURE: 100 MMHG | WEIGHT: 118.6 LBS | OXYGEN SATURATION: 100 % | SYSTOLIC BLOOD PRESSURE: 140 MMHG

## 2025-05-29 DIAGNOSIS — G81.10 SPASTIC HEMIPLEGIA AFFECTING NONDOMINANT SIDE (HCC): ICD-10-CM

## 2025-05-29 DIAGNOSIS — D68.51 FACTOR V LEIDEN MUTATION (HCC): ICD-10-CM

## 2025-05-29 DIAGNOSIS — Z86.73 HISTORY OF ISCHEMIC RIGHT MCA STROKE: Primary | ICD-10-CM

## 2025-05-29 PROCEDURE — 99214 OFFICE O/P EST MOD 30 MIN: CPT

## 2025-05-29 NOTE — PROGRESS NOTES
Name: Shanelle Robledo      : 1990      MRN: [id number]  Encounter Provider: ZOE Gambino  Encounter Date: 2025   Encounter department: [facility]  :  Assessment & Plan  History of ischemic right MCA stroke  2020 Acute right MCA stroke  Not a candidate for t-PA as she was outside the time window, but was candidate for neuro-IR  Mechanical thrombectomy of right M1 occlusion with subsequent  TICI 2a flow.   Repeat head CT 2020 demonstrated right M1 new / recurrent thromboembolus    Cause of stroke is cryptogenic; is heterozygous for Factor V Leiden    Anticoagulated on eliquis for suspected embolic cause, possible hypercoaguable state.      Necessity for long-term monitoring to rule out silent atrial arrhythmias.    Pt wore long-term mobile cardiac telemetry monitoring x30 days which failed to identify any potential arrhythmias.  10/20 Implanted loop recorder   Has not followed with cardiology in 2 years   Encouraged follow up          Spastic hemiplegia affecting nondominant side (HCC)  Left hand with residual deficits   Has splint that she wears daily            Factor V Leiden mutation (HCC)  Has not been compliant with medications/care related to cost   Current medication: Eliquis 2.5mg                History of Present Illness   HPI  Review of Systems   Constitutional:  Negative for activity change, chills, fatigue and fever.   HENT:  Negative for congestion, ear pain, rhinorrhea, sore throat and trouble swallowing.    Eyes:  Negative for pain and visual disturbance.   Respiratory:  Negative for cough, chest tightness and shortness of breath.    Cardiovascular:  Negative for chest pain, palpitations and leg swelling.   Gastrointestinal:  Negative for abdominal pain, constipation, diarrhea, nausea and vomiting.   Genitourinary:  Negative for difficulty urinating, dysuria, hematuria and urgency.   Musculoskeletal:  Negative for arthralgias and back pain.  "  Skin:  Negative for color change and rash.   Neurological:  Negative for dizziness, seizures, syncope and headaches.   Psychiatric/Behavioral:  Negative for dysphoric mood. The patient is not nervous/anxious.    All other systems reviewed and are negative.      Objective   /100 (BP Location: Left arm, Patient Position: Sitting, Cuff Size: Large)   Pulse 99   Temp 98.2 °F (36.8 °C) (Temporal)   Ht 5' 3\" (1.6 m)   Wt 53.8 kg (118 lb 9.6 oz)   SpO2 100%   BMI 21.01 kg/m²      Physical Exam  Vitals and nursing note reviewed.   Constitutional:       General: She is not in acute distress.     Appearance: Normal appearance. She is well-developed and normal weight.   HENT:      Head: Normocephalic and atraumatic.      Right Ear: Tympanic membrane, ear canal and external ear normal. There is no impacted cerumen.      Left Ear: Tympanic membrane, ear canal and external ear normal. There is no impacted cerumen.      Nose: Nose normal.      Mouth/Throat:      Mouth: Mucous membranes are moist.      Pharynx: Oropharynx is clear.     Eyes:      Extraocular Movements: Extraocular movements intact.      Conjunctiva/sclera: Conjunctivae normal.      Pupils: Pupils are equal, round, and reactive to light.       Cardiovascular:      Rate and Rhythm: Normal rate and regular rhythm.      Pulses: Normal pulses.      Heart sounds: Normal heart sounds. No murmur heard.  Pulmonary:      Effort: Pulmonary effort is normal. No respiratory distress.      Breath sounds: Normal breath sounds.   Abdominal:      General: Bowel sounds are normal.      Palpations: Abdomen is soft.      Tenderness: There is no abdominal tenderness.     Musculoskeletal:         General: Normal range of motion.      Cervical back: Normal range of motion and neck supple.      Right lower leg: No edema.      Left lower leg: No edema.     Skin:     General: Skin is warm and dry.      Capillary Refill: Capillary refill takes less than 2 seconds. "     Neurological:      General: No focal deficit present.      Mental Status: She is alert and oriented to person, place, and time. Mental status is at baseline.     Psychiatric:         Mood and Affect: Mood normal.         Behavior: Behavior normal.         Thought Content: Thought content normal.         Judgment: Judgment normal.

## 2025-05-30 NOTE — ASSESSMENT & PLAN NOTE
Has not been compliant with medications/care related to cost   Current medication: Eliquis 2.5mg

## 2025-05-30 NOTE — ASSESSMENT & PLAN NOTE
08/2020 Acute right MCA stroke  Not a candidate for t-PA as she was outside the time window, but was candidate for neuro-IR  Mechanical thrombectomy of right M1 occlusion with subsequent  TICI 2a flow.   Repeat head CT 8/6/2020 demonstrated right M1 new / recurrent thromboembolus    Cause of stroke is cryptogenic; is heterozygous for Factor V Leiden    Anticoagulated on eliquis for suspected embolic cause, possible hypercoaguable state.      Necessity for long-term monitoring to rule out silent atrial arrhythmias.    Pt wore long-term mobile cardiac telemetry monitoring x30 days which failed to identify any potential arrhythmias.  10/20 Implanted loop recorder   Has not followed with cardiology in 2 years   Encouraged follow up

## 2025-06-11 ENCOUNTER — OFFICE VISIT (OUTPATIENT)
Dept: FAMILY MEDICINE CLINIC | Facility: CLINIC | Age: 35
End: 2025-06-11
Payer: COMMERCIAL

## 2025-06-11 VITALS
OXYGEN SATURATION: 99 % | WEIGHT: 114 LBS | BODY MASS INDEX: 20.2 KG/M2 | HEART RATE: 99 BPM | TEMPERATURE: 98.2 F | RESPIRATION RATE: 18 BRPM | DIASTOLIC BLOOD PRESSURE: 70 MMHG | SYSTOLIC BLOOD PRESSURE: 110 MMHG | HEIGHT: 63 IN

## 2025-06-11 DIAGNOSIS — R30.0 DYSURIA: Primary | ICD-10-CM

## 2025-06-11 DIAGNOSIS — Z91.148 NONCOMPLIANCE WITH MEDICATIONS: ICD-10-CM

## 2025-06-11 DIAGNOSIS — F32.A ANXIETY AND DEPRESSION: ICD-10-CM

## 2025-06-11 DIAGNOSIS — Z86.73 HISTORY OF ISCHEMIC RIGHT MCA STROKE: ICD-10-CM

## 2025-06-11 DIAGNOSIS — R05.8 RECURRENT PRODUCTIVE COUGH: ICD-10-CM

## 2025-06-11 DIAGNOSIS — G81.10 SPASTIC HEMIPLEGIA AFFECTING NONDOMINANT SIDE (HCC): ICD-10-CM

## 2025-06-11 DIAGNOSIS — F41.9 ANXIETY AND DEPRESSION: ICD-10-CM

## 2025-06-11 DIAGNOSIS — I15.9 SECONDARY HYPERTENSION: ICD-10-CM

## 2025-06-11 DIAGNOSIS — D68.51 FACTOR V LEIDEN MUTATION (HCC): ICD-10-CM

## 2025-06-11 PROBLEM — I10 HYPERTENSION: Status: RESOLVED | Noted: 2024-12-10 | Resolved: 2025-06-11

## 2025-06-11 LAB
BACTERIA UR QL AUTO: ABNORMAL /HPF
BILIRUB UR QL STRIP: NEGATIVE
CLARITY UR: ABNORMAL
COLOR UR: YELLOW
GLUCOSE UR STRIP-MCNC: NEGATIVE MG/DL
HGB UR QL STRIP.AUTO: ABNORMAL
KETONES UR STRIP-MCNC: NEGATIVE MG/DL
LEUKOCYTE ESTERASE UR QL STRIP: ABNORMAL
MUCOUS THREADS UR QL AUTO: ABNORMAL
NITRITE UR QL STRIP: POSITIVE
NON-SQ EPI CELLS URNS QL MICRO: ABNORMAL /HPF
PH UR STRIP.AUTO: 6 [PH]
PROT UR STRIP-MCNC: ABNORMAL MG/DL
RBC #/AREA URNS AUTO: ABNORMAL /HPF
SL AMB  POCT GLUCOSE, UA: NEGATIVE
SL AMB LEUKOCYTE ESTERASE,UA: ABNORMAL
SL AMB POCT BILIRUBIN,UA: NEGATIVE
SL AMB POCT BLOOD,UA: ABNORMAL
SL AMB POCT CLARITY,UA: ABNORMAL
SL AMB POCT COLOR,UA: YELLOW
SL AMB POCT KETONES,UA: NEGATIVE
SL AMB POCT NITRITE,UA: POSITIVE
SL AMB POCT PH,UA: 5
SL AMB POCT SPECIFIC GRAVITY,UA: 1.01
SL AMB POCT URINE HCG: NEGATIVE
SL AMB POCT URINE PROTEIN: ABNORMAL
SL AMB POCT UROBILINOGEN: NEGATIVE
SP GR UR STRIP.AUTO: 1.02 (ref 1–1.03)
UROBILINOGEN UR STRIP-ACNC: <2 MG/DL
WBC #/AREA URNS AUTO: ABNORMAL /HPF

## 2025-06-11 PROCEDURE — 81025 URINE PREGNANCY TEST: CPT

## 2025-06-11 PROCEDURE — G0537 PR RISK ASCVD TST ONCE PR 12 MO: HCPCS

## 2025-06-11 PROCEDURE — 87077 CULTURE AEROBIC IDENTIFY: CPT

## 2025-06-11 PROCEDURE — 87086 URINE CULTURE/COLONY COUNT: CPT

## 2025-06-11 PROCEDURE — 81001 URINALYSIS AUTO W/SCOPE: CPT

## 2025-06-11 PROCEDURE — 87186 SC STD MICRODIL/AGAR DIL: CPT

## 2025-06-11 PROCEDURE — 99214 OFFICE O/P EST MOD 30 MIN: CPT

## 2025-06-11 PROCEDURE — G0439 PPPS, SUBSEQ VISIT: HCPCS

## 2025-06-11 PROCEDURE — 81002 URINALYSIS NONAUTO W/O SCOPE: CPT

## 2025-06-11 RX ORDER — NITROFURANTOIN 25; 75 MG/1; MG/1
100 CAPSULE ORAL 2 TIMES DAILY
Qty: 10 CAPSULE | Refills: 0 | Status: SHIPPED | OUTPATIENT
Start: 2025-06-11 | End: 2025-06-11

## 2025-06-11 RX ORDER — NITROFURANTOIN 25; 75 MG/1; MG/1
100 CAPSULE ORAL 2 TIMES DAILY
Qty: 10 CAPSULE | Refills: 0 | Status: SHIPPED | OUTPATIENT
Start: 2025-06-11 | End: 2025-06-16

## 2025-06-11 NOTE — ASSESSMENT & PLAN NOTE
BP today 110/70   Problem resolved and added to history   Orders:    CBC and differential; Future    Comprehensive metabolic panel; Future    Lipid panel; Future    Protime-INR; Future    D-dimer, quantitative; Future

## 2025-06-11 NOTE — ASSESSMENT & PLAN NOTE
Patient is no longer taking the following medications due to financial constrictions   Eliquis   Trazodone   Amlodipine   Atorvastatin   Famotidine   Sertraline   Tramadol   Risks and benefits discussed. Education and counseling provided.     Orders:    CBC and differential; Future    Comprehensive metabolic panel; Future    Lipid panel; Future    Protime-INR; Future    D-dimer, quantitative; Future

## 2025-06-11 NOTE — ASSESSMENT & PLAN NOTE
08/2020 Acute right MCA stroke  Not a candidate for t-PA as she was outside the time window, but was candidate for neuro-IR  Mechanical thrombectomy of right M1 occlusion with subsequent  TICI 2a flow.   Repeat head CT 8/6/2020 demonstrated right M1 new / recurrent thromboembolus     Cause of stroke is cryptogenic; is heterozygous for Factor V Leiden    Necessity for long-term monitoring to rule out silent atrial arrhythmias.    Pt wore long-term mobile cardiac telemetry monitoring x30 days which failed to identify any potential arrhythmias.  10/20 Implanted loop recorder   Has not followed with cardiology in 2 years   Encouraged follow up   Orders:    CBC and differential; Future    Comprehensive metabolic panel; Future    Lipid panel; Future    Protime-INR; Future    D-dimer, quantitative; Future
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
## 2025-06-11 NOTE — ASSESSMENT & PLAN NOTE
Left hand with residual deficits   Has splint that she wears daily      Orders:    CBC and differential; Future    Comprehensive metabolic panel; Future    Lipid panel; Future    Protime-INR; Future    D-dimer, quantitative; Future

## 2025-06-11 NOTE — PATIENT INSTRUCTIONS
Medicare Preventive Visit Patient Instructions  Thank you for completing your Welcome to Medicare Visit or Medicare Annual Wellness Visit today. Your next wellness visit will be due in one year (6/12/2026).  The screening/preventive services that you may require over the next 5-10 years are detailed below. Some tests may not apply to you based off risk factors and/or age. Screening tests ordered at today's visit but not completed yet may show as past due. Also, please note that scanned in results may not display below.  Preventive Screenings:  Service Recommendations Previous Testing/Comments   Colorectal Cancer Screening  * Colonoscopy    * Fecal Occult Blood Test (FOBT)/Fecal Immunochemical Test (FIT)  * Fecal DNA/Cologuard Test  * Flexible Sigmoidoscopy Age: 45-75 years old   Colonoscopy: every 10 years (may be performed more frequently if at higher risk)  OR  FOBT/FIT: every 1 year  OR  Cologuard: every 3 years  OR  Sigmoidoscopy: every 5 years  Screening may be recommended earlier than age 45 if at higher risk for colorectal cancer. Also, an individualized decision between you and your healthcare provider will decide whether screening between the ages of 76-85 would be appropriate. Colonoscopy: Not on file  FOBT/FIT: Not on file  Cologuard: Not on file  Sigmoidoscopy: Not on file          Breast Cancer Screening Age: 40+ years old  Frequency: every 1-2 years  Not required if history of left and right mastectomy Mammogram: Not on file    Screening Not Indicated   Cervical Cancer Screening Between the ages of 21-29, pap smear recommended once every 3 years.   Between the ages of 30-65, can perform pap smear with HPV co-testing every 5 years.   Recommendations may differ for women with a history of total hysterectomy, cervical cancer, or abnormal pap smears in past. Pap Smear: Not on file        Hepatitis C Screening Once for adults born between 1945 and 1965  More frequently in patients at high risk for Hepatitis  C Hep C Antibody: 04/01/2023    Screening Current   Diabetes Screening 1-2 times per year if you're at risk for diabetes or have pre-diabetes Fasting glucose: 87 mg/dL (12/26/2024)  A1C: 5.4 % (8/5/2020)  Screening Current   Cholesterol Screening Once every 5 years if you don't have a lipid disorder. May order more often based on risk factors. Lipid panel: 12/26/2024    Screening Current     Other Preventive Screenings Covered by Medicare:  Abdominal Aortic Aneurysm (AAA) Screening: covered once if your at risk. You're considered to be at risk if you have a family history of AAA.  Lung Cancer Screening: covers low dose CT scan once per year if you meet all of the following conditions: (1) Age 55-77; (2) No signs or symptoms of lung cancer; (3) Current smoker or have quit smoking within the last 15 years; (4) You have a tobacco smoking history of at least 20 pack years (packs per day multiplied by number of years you smoked); (5) You get a written order from a healthcare provider.  Glaucoma Screening: covered annually if you're considered high risk: (1) You have diabetes OR (2) Family history of glaucoma OR (3)  aged 50 and older OR (4)  American aged 65 and older  Osteoporosis Screening: covered every 2 years if you meet one of the following conditions: (1) You're estrogen deficient and at risk for osteoporosis based off medical history and other findings; (2) Have a vertebral abnormality; (3) On glucocorticoid therapy for more than 3 months; (4) Have primary hyperparathyroidism; (5) On osteoporosis medications and need to assess response to drug therapy.   Last bone density test (DXA Scan): Not on file.  HIV Screening: covered annually if you're between the age of 15-65. Also covered annually if you are younger than 15 and older than 65 with risk factors for HIV infection. For pregnant patients, it is covered up to 3 times per pregnancy.    Immunizations:  Immunization Recommendations    Influenza Vaccine Annual influenza vaccination during flu season is recommended for all persons aged >= 6 months who do not have contraindications   Pneumococcal Vaccine   * Pneumococcal conjugate vaccine = PCV13 (Prevnar 13), PCV15 (Vaxneuvance), PCV20 (Prevnar 20)  * Pneumococcal polysaccharide vaccine = PPSV23 (Pneumovax) Adults 19-65 yo with certain risk factors or if 65+ yo  If never received any pneumonia vaccine: recommend Prevnar 20 (PCV20)  Give PCV20 if previously received 1 dose of PCV13 or PPSV23   Hepatitis B Vaccine 3 dose series if at intermediate or high risk (ex: diabetes, end stage renal disease, liver disease)   Respiratory syncytial virus (RSV) Vaccine - COVERED BY MEDICARE PART D  * RSVPreF3 (Arexvy) CDC recommends that adults 60 years of age and older may receive a single dose of RSV vaccine using shared clinical decision-making (SCDM)   Tetanus (Td) Vaccine - COST NOT COVERED BY MEDICARE PART B Following completion of primary series, a booster dose should be given every 10 years to maintain immunity against tetanus. Td may also be given as tetanus wound prophylaxis.   Tdap Vaccine - COST NOT COVERED BY MEDICARE PART B Recommended at least once for all adults. For pregnant patients, recommended with each pregnancy.   Shingles Vaccine (Shingrix) - COST NOT COVERED BY MEDICARE PART B  2 shot series recommended in those 19 years and older who have or will have weakened immune systems or those 50 years and older     Health Maintenance Due:      Topic Date Due   • Cervical Cancer Screening  Never done   • HIV Screening  Completed   • Hepatitis C Screening  Completed     Immunizations Due:      Topic Date Due   • HPV Vaccine (1 - 3-dose series) Never done   • Pneumococcal Vaccine: Pediatrics (0 to 5 Years) and At-Risk Patients (6 to 49 Years) (1 of 2 - PCV) Never done   • COVID-19 Vaccine (3 - 2024-25 season) 09/01/2024   • Influenza Vaccine (Season Ended) 09/01/2025     Advance Directives   What  are advance directives?  Advance directives are legal documents that state your wishes and plans for medical care. These plans are made ahead of time in case you lose your ability to make decisions for yourself. Advance directives can apply to any medical decision, such as the treatments you want, and if you want to donate organs.   What are the types of advance directives?  There are many types of advance directives, and each state has rules about how to use them. You may choose a combination of any of the following:  Living will:  This is a written record of the treatment you want. You can also choose which treatments you do not want, which to limit, and which to stop at a certain time. This includes surgery, medicine, IV fluid, and tube feedings.   Durable power of  for healthcare (DPAHC):  This is a written record that states who you want to make healthcare choices for you when you are unable to make them for yourself. This person, called a proxy, is usually a family member or a friend. You may choose more than 1 proxy.  Do not resuscitate (DNR) order:  A DNR order is used in case your heart stops beating or you stop breathing. It is a request not to have certain forms of treatment, such as CPR. A DNR order may be included in other types of advance directives.  Medical directive:  This covers the care that you want if you are in a coma, near death, or unable to make decisions for yourself. You can list the treatments you want for each condition. Treatment may include pain medicine, surgery, blood transfusions, dialysis, IV or tube feedings, and a ventilator (breathing machine).  Values history:  This document has questions about your views, beliefs, and how you feel and think about life. This information can help others choose the care that you would choose.  Why are advance directives important?  An advance directive helps you control your care. Although spoken wishes may be used, it is better to have  your wishes written down. Spoken wishes can be misunderstood, or not followed. Treatments may be given even if you do not want them. An advance directive may make it easier for your family to make difficult choices about your care.   Cigarette Smoking and Your Health   Risks to your health if you smoke:  Nicotine and other chemicals found in tobacco damage every cell in your body. Even if you are a light smoker, you have an increased risk for cancer, heart disease, and lung disease. If you are pregnant or have diabetes, smoking increases your risk for complications.   Benefits to your health if you stop smoking:   You decrease respiratory symptoms such as coughing, wheezing, and shortness of breath.   You reduce your risk for cancers of the lung, mouth, throat, kidney, bladder, pancreas, stomach, and cervix. If you already have cancer, you increase the benefits of chemotherapy. You also reduce your risk for cancer returning or a second cancer from developing.   You reduce your risk for heart disease, blood clots, heart attack, and stroke.   You reduce your risk for lung infections, and diseases such as pneumonia, asthma, chronic bronchitis, and emphysema.  Your circulation improves. More oxygen can be delivered to your body. If you have diabetes, you lower your risk for complications, such as kidney, artery, and eye diseases. You also lower your risk for nerve damage. Nerve damage can lead to amputations, poor vision, and blindness.  You improve your body's ability to heal and to fight infections.  For more information and support to stop smoking:   Smokefree.gov  Phone: 9- 749 - 761-6315  Web Address: www.[employer] [place]     © Copyright [employer] 2018 Information is for End User's use only and may not be sold, redistributed or otherwise used for commercial purposes. All illustrations and images included in CareNotes® are the copyrighted property of A.D.A.M., Inc. or [employer] Medicare [facility]  Visit Patient Instructions  Thank you for completing your Welcome to Medicare Visit or Medicare Annual Wellness Visit today. Your next wellness visit will be due in one year (6/12/2026).  The screening/preventive services that you may require over the next 5-10 years are detailed below. Some tests may not apply to you based off risk factors and/or age. Screening tests ordered at today's visit but not completed yet may show as past due. Also, please note that scanned in results may not display below.  Preventive Screenings:  Service Recommendations Previous Testing/Comments   Colorectal Cancer Screening  * Colonoscopy    * Fecal Occult Blood Test (FOBT)/Fecal Immunochemical Test (FIT)  * Fecal DNA/Cologuard Test  * Flexible Sigmoidoscopy Age: 45-75 years old   Colonoscopy: every 10 years (may be performed more frequently if at higher risk)  OR  FOBT/FIT: every 1 year  OR  Cologuard: every 3 years  OR  Sigmoidoscopy: every 5 years  Screening may be recommended earlier than age 45 if at higher risk for colorectal cancer. Also, an individualized decision between you and your healthcare provider will decide whether screening between the ages of 76-85 would be appropriate. Colonoscopy: Not on file  FOBT/FIT: Not on file  Cologuard: Not on file  Sigmoidoscopy: Not on file          Breast Cancer Screening Age: 40+ years old  Frequency: every 1-2 years  Not required if history of left and right mastectomy Mammogram: Not on file    Screening Not Indicated   Cervical Cancer Screening Between the ages of 21-29, pap smear recommended once every 3 years.   Between the ages of 30-65, can perform pap smear with HPV co-testing every 5 years.   Recommendations may differ for women with a history of total hysterectomy, cervical cancer, or abnormal pap smears in past. Pap Smear: Not on file        Hepatitis C Screening Once for adults born between 1945 and 1965  More frequently in patients at high risk for Hepatitis C Hep C Antibody:  04/01/2023    Screening Current   Diabetes Screening 1-2 times per year if you're at risk for diabetes or have pre-diabetes Fasting glucose: 87 mg/dL (12/26/2024)  A1C: 5.4 % (8/5/2020)  Screening Current   Cholesterol Screening Once every 5 years if you don't have a lipid disorder. May order more often based on risk factors. Lipid panel: 12/26/2024    Screening Current     Other Preventive Screenings Covered by Medicare:  Abdominal Aortic Aneurysm (AAA) Screening: covered once if your at risk. You're considered to be at risk if you have a family history of AAA.  Lung Cancer Screening: covers low dose CT scan once per year if you meet all of the following conditions: (1) Age 55-77; (2) No signs or symptoms of lung cancer; (3) Current smoker or have quit smoking within the last 15 years; (4) You have a tobacco smoking history of at least 20 pack years (packs per day multiplied by number of years you smoked); (5) You get a written order from a healthcare provider.  Glaucoma Screening: covered annually if you're considered high risk: (1) You have diabetes OR (2) Family history of glaucoma OR (3)  aged 50 and older OR (4)  American aged 65 and older  Osteoporosis Screening: covered every 2 years if you meet one of the following conditions: (1) You're estrogen deficient and at risk for osteoporosis based off medical history and other findings; (2) Have a vertebral abnormality; (3) On glucocorticoid therapy for more than 3 months; (4) Have primary hyperparathyroidism; (5) On osteoporosis medications and need to assess response to drug therapy.   Last bone density test (DXA Scan): Not on file.  HIV Screening: covered annually if you're between the age of 15-65. Also covered annually if you are younger than 15 and older than 65 with risk factors for HIV infection. For pregnant patients, it is covered up to 3 times per pregnancy.    Immunizations:  Immunization Recommendations   Influenza Vaccine  Annual influenza vaccination during flu season is recommended for all persons aged >= 6 months who do not have contraindications   Pneumococcal Vaccine   * Pneumococcal conjugate vaccine = PCV13 (Prevnar 13), PCV15 (Vaxneuvance), PCV20 (Prevnar 20)  * Pneumococcal polysaccharide vaccine = PPSV23 (Pneumovax) Adults 19-63 yo with certain risk factors or if 65+ yo  If never received any pneumonia vaccine: recommend Prevnar 20 (PCV20)  Give PCV20 if previously received 1 dose of PCV13 or PPSV23   Hepatitis B Vaccine 3 dose series if at intermediate or high risk (ex: diabetes, end stage renal disease, liver disease)   Respiratory syncytial virus (RSV) Vaccine - COVERED BY MEDICARE PART D  * RSVPreF3 (Arexvy) CDC recommends that adults 60 years of age and older may receive a single dose of RSV vaccine using shared clinical decision-making (SCDM)   Tetanus (Td) Vaccine - COST NOT COVERED BY MEDICARE PART B Following completion of primary series, a booster dose should be given every 10 years to maintain immunity against tetanus. Td may also be given as tetanus wound prophylaxis.   Tdap Vaccine - COST NOT COVERED BY MEDICARE PART B Recommended at least once for all adults. For pregnant patients, recommended with each pregnancy.   Shingles Vaccine (Shingrix) - COST NOT COVERED BY MEDICARE PART B  2 shot series recommended in those 19 years and older who have or will have weakened immune systems or those 50 years and older     Health Maintenance Due:      Topic Date Due   • Cervical Cancer Screening  Never done   • HIV Screening  Completed   • Hepatitis C Screening  Completed     Immunizations Due:      Topic Date Due   • HPV Vaccine (1 - 3-dose series) Never done   • Pneumococcal Vaccine: Pediatrics (0 to 5 Years) and At-Risk Patients (6 to 49 Years) (1 of 2 - PCV) Never done   • COVID-19 Vaccine (3 - 2024-25 season) 09/01/2024   • Influenza Vaccine (Season Ended) 09/01/2025     Advance Directives   What are advance  directives?  Advance directives are legal documents that state your wishes and plans for medical care. These plans are made ahead of time in case you lose your ability to make decisions for yourself. Advance directives can apply to any medical decision, such as the treatments you want, and if you want to donate organs.   What are the types of advance directives?  There are many types of advance directives, and each state has rules about how to use them. You may choose a combination of any of the following:  Living will:  This is a written record of the treatment you want. You can also choose which treatments you do not want, which to limit, and which to stop at a certain time. This includes surgery, medicine, IV fluid, and tube feedings.   Durable power of  for healthcare (DPAHC):  This is a written record that states who you want to make healthcare choices for you when you are unable to make them for yourself. This person, called a proxy, is usually a family member or a friend. You may choose more than 1 proxy.  Do not resuscitate (DNR) order:  A DNR order is used in case your heart stops beating or you stop breathing. It is a request not to have certain forms of treatment, such as CPR. A DNR order may be included in other types of advance directives.  Medical directive:  This covers the care that you want if you are in a coma, near death, or unable to make decisions for yourself. You can list the treatments you want for each condition. Treatment may include pain medicine, surgery, blood transfusions, dialysis, IV or tube feedings, and a ventilator (breathing machine).  Values history:  This document has questions about your views, beliefs, and how you feel and think about life. This information can help others choose the care that you would choose.  Why are advance directives important?  An advance directive helps you control your care. Although spoken wishes may be used, it is better to have your wishes  written down. Spoken wishes can be misunderstood, or not followed. Treatments may be given even if you do not want them. An advance directive may make it easier for your family to make difficult choices about your care.   Cigarette Smoking and Your Health   Risks to your health if you smoke:  Nicotine and other chemicals found in tobacco damage every cell in your body. Even if you are a light smoker, you have an increased risk for cancer, heart disease, and lung disease. If you are pregnant or have diabetes, smoking increases your risk for complications.   Benefits to your health if you stop smoking:   You decrease respiratory symptoms such as coughing, wheezing, and shortness of breath.   You reduce your risk for cancers of the lung, mouth, throat, kidney, bladder, pancreas, stomach, and cervix. If you already have cancer, you increase the benefits of chemotherapy. You also reduce your risk for cancer returning or a second cancer from developing.   You reduce your risk for heart disease, blood clots, heart attack, and stroke.   You reduce your risk for lung infections, and diseases such as pneumonia, asthma, chronic bronchitis, and emphysema.  Your circulation improves. More oxygen can be delivered to your body. If you have diabetes, you lower your risk for complications, such as kidney, artery, and eye diseases. You also lower your risk for nerve damage. Nerve damage can lead to amputations, poor vision, and blindness.  You improve your body's ability to heal and to fight infections.  For more information and support to stop smoking:   Smokefree.gov  Phone: 4- 427 - 427-2798  Web Address: www.[employer]   © Copyright [employer] 2018 Information is for End User's use only and may not be sold, redistributed or otherwise used for commercial purposes. All illustrations and images included in CareNotes® are the copyrighted property of A.D.A.M., Inc. or [employer]

## 2025-06-11 NOTE — ASSESSMENT & PLAN NOTE
Has not been compliant with medications (Eliquis 2.5mg) related to cost   Risks and benefits discussed. Education and counseling provided.   Orders:    CBC and differential; Future    Comprehensive metabolic panel; Future    Lipid panel; Future    Protime-INR; Future    D-dimer, quantitative; Future

## 2025-06-14 LAB — BACTERIA UR CULT: ABNORMAL

## 2025-06-16 ENCOUNTER — PATIENT OUTREACH (OUTPATIENT)
Dept: CASE MANAGEMENT | Facility: OTHER | Age: 35
End: 2025-06-16

## 2025-06-16 DIAGNOSIS — Z13.9 ENCOUNTER FOR SCREENING INVOLVING SOCIAL DETERMINANTS OF HEALTH (SDOH): Primary | ICD-10-CM

## 2025-06-17 ENCOUNTER — PATIENT OUTREACH (OUTPATIENT)
Dept: CASE MANAGEMENT | Facility: OTHER | Age: 35
End: 2025-06-17

## 2025-06-17 NOTE — PROGRESS NOTES
RASHMI NESS received referral for patient from [facility]ASH[facility] CM Lead for encounter for screening involving SDOH. Per referral comments, referral placed for assistance with housing resources.    Patient has recent prior OP [facility] outreach for assistance with housing. [facility] assisted at the time with housing applications and resources. [facility] sent [facility] Housing application per the request of patient. Contact was then lost with patient.    RASHMI NESS placed initial outreach call to patient. Patient answered the phone and RASHMI NESS introduced self and then patient ended the call. RASHMI NESS attempted another outreach call. The call went right to voicemail and the mailbox was full, RASHMI NESS was not able to leave a message. RASHMI NESS to place second outreach call within one week if return call is not received prior.

## 2025-06-19 ENCOUNTER — PATIENT OUTREACH (OUTPATIENT)
Dept: CASE MANAGEMENT | Facility: OTHER | Age: 35
End: 2025-06-19

## 2025-06-19 NOTE — PROGRESS NOTES
RASHMI NESS placed second outreach call to patient. RASHMI NESS introduced self, explained role and reasoning for outreach. Patient stated that she had housing resources. RASHMI NESS asked if patient needed assistance with anything else and patient declined. Call was ended. Referral closed.

## 2025-07-25 ENCOUNTER — TELEPHONE (OUTPATIENT)
Dept: OTHER | Facility: HOSPITAL | Age: 35
End: 2025-07-25

## 2025-07-25 DIAGNOSIS — G81.10 SPASTIC HEMIPLEGIA AFFECTING NONDOMINANT SIDE (HCC): Primary | ICD-10-CM

## 2025-07-25 NOTE — TELEPHONE ENCOUNTER
Patient called the rx line. She is requesting a referral to [facility] for physical and occupational therapy.

## 2025-08-11 ENCOUNTER — TELEPHONE (OUTPATIENT)
Age: 35
End: 2025-08-11